# Patient Record
Sex: MALE | Race: WHITE | NOT HISPANIC OR LATINO | Employment: OTHER | URBAN - METROPOLITAN AREA
[De-identification: names, ages, dates, MRNs, and addresses within clinical notes are randomized per-mention and may not be internally consistent; named-entity substitution may affect disease eponyms.]

---

## 2019-02-02 ENCOUNTER — OFFICE VISIT (OUTPATIENT)
Dept: URGENT CARE | Age: 69
End: 2019-02-02
Payer: MEDICARE

## 2019-02-02 ENCOUNTER — APPOINTMENT (OUTPATIENT)
Dept: RADIOLOGY | Age: 69
End: 2019-02-02
Payer: MEDICARE

## 2019-02-02 VITALS
HEART RATE: 99 BPM | HEIGHT: 70 IN | BODY MASS INDEX: 22.05 KG/M2 | OXYGEN SATURATION: 97 % | RESPIRATION RATE: 18 BRPM | DIASTOLIC BLOOD PRESSURE: 69 MMHG | SYSTOLIC BLOOD PRESSURE: 133 MMHG | TEMPERATURE: 100.7 F | WEIGHT: 154 LBS

## 2019-02-02 DIAGNOSIS — R68.89 FLU-LIKE SYMPTOMS: Primary | ICD-10-CM

## 2019-02-02 DIAGNOSIS — R68.89 FLU-LIKE SYMPTOMS: ICD-10-CM

## 2019-02-02 PROCEDURE — 99203 OFFICE O/P NEW LOW 30 MIN: CPT | Performed by: PHYSICIAN ASSISTANT

## 2019-02-02 PROCEDURE — 71046 X-RAY EXAM CHEST 2 VIEWS: CPT

## 2019-02-02 PROCEDURE — G0463 HOSPITAL OUTPT CLINIC VISIT: HCPCS | Performed by: PHYSICIAN ASSISTANT

## 2019-02-02 RX ORDER — ATORVASTATIN CALCIUM 40 MG/1
40 TABLET, FILM COATED ORAL
COMMUNITY

## 2019-02-02 RX ORDER — ALBUTEROL SULFATE 1.25 MG/3ML
1 SOLUTION RESPIRATORY (INHALATION) EVERY 6 HOURS PRN
COMMUNITY
End: 2022-03-22

## 2019-02-02 RX ORDER — NITROGLYCERIN 0.4 MG/1
0.4 TABLET SUBLINGUAL
COMMUNITY
Start: 2012-03-26 | End: 2021-09-20

## 2019-02-02 RX ORDER — TAMSULOSIN HYDROCHLORIDE 0.4 MG/1
0.4 CAPSULE ORAL
COMMUNITY
End: 2021-09-20

## 2019-02-02 RX ORDER — GUAIFENESIN 600 MG
1200 TABLET, EXTENDED RELEASE 12 HR ORAL EVERY 12 HOURS SCHEDULED
Qty: 20 TABLET | Refills: 0 | Status: SHIPPED | OUTPATIENT
Start: 2019-02-02 | End: 2020-03-10 | Stop reason: ALTCHOICE

## 2019-02-02 RX ORDER — BENZONATATE 100 MG/1
100 CAPSULE ORAL 3 TIMES DAILY PRN
Qty: 20 CAPSULE | Refills: 0 | Status: SHIPPED | OUTPATIENT
Start: 2019-02-02 | End: 2020-03-10 | Stop reason: ALTCHOICE

## 2019-02-02 RX ORDER — SERTRALINE HYDROCHLORIDE 100 MG/1
100 TABLET, FILM COATED ORAL DAILY
COMMUNITY
Start: 2013-02-05

## 2019-02-02 RX ORDER — POLYVINYL ALCOHOL 14 MG/ML
1 SOLUTION/ DROPS OPHTHALMIC
COMMUNITY
Start: 2014-08-07

## 2019-02-02 NOTE — PROGRESS NOTES
[email or web address]        NAME: April Monroy is a 77 y o  male  : 1950    MRN: [id number]  DATE: 2019  TIME: 10:07 AM    Assessment and Plan   Flu-like symptoms [R68 89]  1  Flu-like symptoms  XR chest pa & lateral    benzonatate (TESSALON PERLES) 100 mg capsule    guaiFENesin (MUCINEX) 600 mg 12 hr tablet       CXR: no acute cardiopulmonary disease  In light of prior year's serious flu complications, emphasized symptoms in which to go to the ED  Patient verbalized understanding  Patient offered and declined Tamiflu, understanding that he is at the end of the effective range  Patient Instructions     Fluids and rest  Tylenol/Ibuprofen for pain/fever  Monitor for worsening symptoms   Follow up with PCP in 3-5 days  Proceed to  ER if symptoms worsen  Chief Complaint     Chief Complaint   Patient presents with    Headache     body hurts, neck pain, chest pressure bring up mucus green/yellow, fever yesterday          History of Present Illness       Patient had the influenza vaccination  States his son had the flu this past week  Admits to 7/10 sharp substernal chest pain that only occurs with coughing without radiation that feels similar to prior bronchitis episodes  URI    This is a new problem  Episode onset:  night  The problem has been gradually worsening  The maximum temperature recorded prior to his arrival was 100 4 - 100 9 F  Associated symptoms include chest pain, coughing (productive), headaches and neck pain  Pertinent negatives include no abdominal pain, congestion, diarrhea, ear pain, nausea, rash, rhinorrhea, sinus pain, sneezing, sore throat, vomiting or wheezing  Treatments tried: mucinex  The treatment provided mild relief  Review of Systems   Review of Systems   Constitutional: Positive for chills, fatigue and fever  Negative for activity change and appetite change     HENT: Negative for congestion, ear discharge, ear pain, postnasal drip, rhinorrhea, sinus pain, sinus pressure, sneezing and sore throat  Eyes: Negative for photophobia and visual disturbance  Respiratory: Positive for cough (productive) and shortness of breath  Negative for wheezing  Cardiovascular: Positive for chest pain  Negative for palpitations  Gastrointestinal: Negative for abdominal pain, diarrhea, nausea and vomiting  Musculoskeletal: Positive for myalgias and neck pain  Negative for neck stiffness  Skin: Negative for rash  Neurological: Positive for light-headedness and headaches  Negative for dizziness, tremors, seizures, syncope, facial asymmetry, speech difficulty and numbness  Psychiatric/Behavioral: Negative for confusion           Current Medications       Current Outpatient Prescriptions:     albuterol (ACCUNEB) 1 25 MG/3ML nebulizer solution, Take 1 ampule by nebulization every 6 (six) hours as needed for wheezing, Disp: , Rfl:     Cholecalciferol 2000 units CAPS, Take 2,000 Units by mouth, Disp: , Rfl:     nitroglycerin (NITROSTAT) 0 4 mg SL tablet, Place 0 4 mg under the tongue, Disp: , Rfl:     polyvinyl alcohol (LIQUIFILM TEARS) 1 4 % ophthalmic solution, Apply 1 drop to eye, Disp: , Rfl:     sertraline (ZOLOFT) 100 mg tablet, Take 100 mg by mouth, Disp: , Rfl:     atorvastatin (LIPITOR) 80 mg tablet, Take 40 mg by mouth, Disp: , Rfl:     benzonatate (TESSALON PERLES) 100 mg capsule, Take 1 capsule (100 mg total) by mouth 3 (three) times a day as needed for cough, Disp: 20 capsule, Rfl: 0    dextran 70-hypromellose (GENTEAL TEARS) 0 1-0 3 % ophthalmic solution, Apply 1 drop to eye, Disp: , Rfl:     guaiFENesin (MUCINEX) 600 mg 12 hr tablet, Take 2 tablets (1,200 mg total) by mouth every 12 (twelve) hours, Disp: 20 tablet, Rfl: 0    tamsulosin (FLOMAX) 0 4 mg, Take 0 4 mg by mouth, Disp: , Rfl:     Current Allergies     Allergies as of 02/02/2019 - Reviewed 02/02/2019   Allergen Reaction Noted    Cefadroxil Other (See Comments) 07/07/2004    Fentanyl Palpitations 02/02/2019            The following portions of the patient's history were reviewed and updated as appropriate: allergies, current medications, past family history, past medical history, past social history, past surgical history and problem list      Past Medical History:   Diagnosis Date    Hypertension     PTSD (post-traumatic stress disorder)        No past surgical history on file  Family History   Problem Relation Age of Onset    Coronary artery disease Brother     Hypertension Mother          Medications have been verified  Objective   /69   Pulse 99   Temp (!) 100 7 °F (38 2 °C)   Resp 18   Ht 5' 10" (1 778 m)   Wt 69 9 kg (154 lb)   SpO2 97%   BMI 22 10 kg/m²        Physical Exam     Physical Exam   Constitutional: He is oriented to person, place, and time  He appears well-developed and well-nourished  No distress  Patient appears well, is not in any distress and is speaking in full sentences  HENT:   Head: Normocephalic and atraumatic  Right Ear: External ear normal    Left Ear: External ear normal    Mouth/Throat: Oropharynx is clear and moist  No oropharyngeal exudate  Eyes: Pupils are equal, round, and reactive to light  Neck:   Negative Kernigs and Brudzinski  Cardiovascular: Normal rate, regular rhythm and normal heart sounds  Exam reveals no gallop and no friction rub  No murmur heard  Pulmonary/Chest: Effort normal and breath sounds normal  No respiratory distress  He has no wheezes  He has no rales  He exhibits no tenderness  Musculoskeletal: Normal range of motion  Lymphadenopathy:     He has no cervical adenopathy  Neurological: He is alert and oriented to person, place, and time  Skin: Skin is warm  No rash noted  Psychiatric: He has a normal mood and affect  His behavior is normal  Judgment and thought content normal    Vitals reviewed

## 2019-02-02 NOTE — PATIENT INSTRUCTIONS
Fluids and rest  Tylenol/Ibuprofen for pain/fever  Monitor for worsening symptoms   Follow up with PCP in 3-5 days  Proceed to  ER if symptoms worsen  Influenza   WHAT YOU NEED TO KNOW:   Influenza (the flu) is an infection caused by the influenza virus  The flu is easily spread when an infected person coughs, sneezes, or has close contact with others  You may be able to spread the flu to others for 1 week or longer after signs or symptoms appear  DISCHARGE INSTRUCTIONS:   Call 911 for any of the following:   · You have trouble breathing, and your lips look purple or blue  · You have a seizure  Return to the emergency department if:   · You are dizzy, or you are urinating less or not at all  · You have a headache with a stiff neck, and you feel tired or confused  · You have new pain or pressure in your chest     · Your symptoms, such as shortness of breath, vomiting, or diarrhea, get worse  · Your symptoms, such as fever and coughing, seem to get better, but then get worse  Contact your healthcare provider if:   · You have new muscle pain or weakness  · You have questions or concerns about your condition or care  Medicines: You may need any of the following:  · Acetaminophen  decreases pain and fever  It is available without a doctor's order  Ask how much to take and how often to take it  Follow directions  Acetaminophen can cause liver damage if not taken correctly  · NSAIDs , such as ibuprofen, help decrease swelling, pain, and fever  This medicine is available with or without a doctor's order  NSAIDs can cause stomach bleeding or kidney problems in certain people  If you take blood thinner medicine, always ask your healthcare provider if NSAIDs are safe for you  Always read the medicine label and follow directions  · Antivirals  help fight a viral infection  · Take your medicine as directed    Contact your healthcare provider if you think your medicine is not helping or if you have side effects  Tell him or her if you are allergic to any medicine  Keep a list of the medicines, vitamins, and herbs you take  Include the amounts, and when and why you take them  Bring the list or the pill bottles to follow-up visits  Carry your medicine list with you in case of an emergency  Rest  as much as you can to help you recover  Drink liquids as directed  to help prevent dehydration  Ask how much liquid to drink each day and which liquids are best for you  Prevent the spread of influenza:   · Wash your hands often  Use soap and water  Wash your hands after you use the bathroom, change a child's diapers, or sneeze  Wash your hands before you prepare or eat food  Use gel hand cleanser when soap and water are not available  Do not touch your eyes, nose, or mouth unless you have washed your hands first            · Cover your mouth when you sneeze or cough  Cough into a tissue or the bend of your arm  · Clean shared items with a germ-killing   Clean table surfaces, doorknobs, and light switches  Do not share towels, silverware, and dishes with people who are sick  Wash bed sheets, towels, silverware, and dishes with soap and water  · Wear a mask  over your mouth and nose if you are sick or are near anyone who is sick  · Stay away from others  if you are sick  · Influenza vaccine  helps prevent influenza (flu)  Everyone older than 6 months should get a yearly influenza vaccine  Get the vaccine as soon as it is available, usually in September or October each year  Follow up with your healthcare provider as directed:  Write down your questions so you remember to ask them during your visits  © 2017 [facility] Information is for End User's use only and may not be sold, redistributed or otherwise used for commercial purposes  All illustrations and images included in CareNotes® are the copyrighted property of [employer]  or [employer]ank[employer]    The above information is an  only  It is not intended as medical advice for individual conditions or treatments  Talk to your doctor, nurse or pharmacist before following any medical regimen to see if it is safe and effective for you

## 2020-01-13 ENCOUNTER — APPOINTMENT (OUTPATIENT)
Dept: RADIOLOGY | Age: 70
End: 2020-01-13
Payer: MEDICARE

## 2020-01-13 ENCOUNTER — OFFICE VISIT (OUTPATIENT)
Dept: URGENT CARE | Age: 70
End: 2020-01-13
Payer: MEDICARE

## 2020-01-13 VITALS
OXYGEN SATURATION: 99 % | SYSTOLIC BLOOD PRESSURE: 139 MMHG | TEMPERATURE: 99 F | HEART RATE: 80 BPM | RESPIRATION RATE: 20 BRPM | HEIGHT: 70 IN | WEIGHT: 150 LBS | DIASTOLIC BLOOD PRESSURE: 63 MMHG | BODY MASS INDEX: 21.47 KG/M2

## 2020-01-13 DIAGNOSIS — M79.646 PAIN OF FINGER, UNSPECIFIED LATERALITY: ICD-10-CM

## 2020-01-13 DIAGNOSIS — L03.90 CELLULITIS, UNSPECIFIED CELLULITIS SITE: ICD-10-CM

## 2020-01-13 DIAGNOSIS — M79.646 PAIN OF FINGER, UNSPECIFIED LATERALITY: Primary | ICD-10-CM

## 2020-01-13 PROCEDURE — 73130 X-RAY EXAM OF HAND: CPT

## 2020-01-13 PROCEDURE — G0463 HOSPITAL OUTPT CLINIC VISIT: HCPCS | Performed by: PHYSICIAN ASSISTANT

## 2020-01-13 PROCEDURE — 99213 OFFICE O/P EST LOW 20 MIN: CPT | Performed by: PHYSICIAN ASSISTANT

## 2020-01-13 RX ORDER — CLINDAMYCIN HYDROCHLORIDE 300 MG/1
300 CAPSULE ORAL 4 TIMES DAILY
Qty: 40 CAPSULE | Refills: 0 | Status: SHIPPED | OUTPATIENT
Start: 2020-01-13 | End: 2020-01-23

## 2020-01-13 NOTE — PATIENT INSTRUCTIONS
Finger Sprain   WHAT YOU NEED TO KNOW:   A finger sprain happens when ligaments in your finger or thumb are stretched or torn  Ligaments are the tough tissues that connect bones  Ligaments allow your hands to grasp and pinch  DISCHARGE INSTRUCTIONS:   Return to the emergency department if:   · The skin on your injured finger looks bluish or pale (less color than normal)  · You have new weakness or numbness in your finger or thumb  It may tingle or burn  · You have a splint that you cannot adjust and it feels too tight  Contact your healthcare provider if:   · You have new or increased swelling or pain in your finger  · You have new or increased stiffness when you move your injured finger  · You have questions or concerns about your injury or treatment  Medicines:   · Pain medicine  may be given  Do not wait until the pain is severe before taking your medicine  · Take your medicine as directed  Call your healthcare provider if you think your medicines are not helping or if you have side effects  Tell him if you take vitamins, herbs, or any other medicines  Keep a written list of your medicines  Include the amounts, and when and why you take them  Bring the list or the pill bottles to follow-up visits  Care for your finger:   · Rest  your finger for at least 48 hours  Do not do activities that cause pain  Return to normal activities as directed  · Apply ice  on your finger to help decrease pain and swelling  Put crushed ice in a plastic bag and cover it with a towel  Put the ice on your injured finger or thumb every hour for 15 to 20 minutes at a time  You may need to ice the area at least 4 to 8 times each day  Ice your finger for as many days as directed  · Elevate your finger  above the level of your heart as often as you can  This will help decrease swelling and pain  You can elevate your hand by resting it on a pillow  · Use a splint or compression as directed    Compression (tight hold) helps support your finger or thumb as it heals  Tape your injured finger to the finger beside it  Severe sprains may be treated with a splint  A splint prevents your finger from moving while it heals  Ask how long you must wear the splint or tape, and how to apply them  · Do exercises as directed  You may be given gentle exercises to begin in a few days  Exercises can help decrease stiffness in your finger or thumb  Exercises also help decrease pain and swelling and improve the movement of your finger or thumb  Check with your healthcare provider before you return to your normal activities or sports  Follow up with your healthcare provider as directed:  Write down any questions you may have to ask at your follow up visits  © 2017 [address] [employer]  Information is for End User's use only and may not be sold, redistributed or otherwise used for commercial purposes  All illustrations and images included in CareNotes® are the copyrighted property of A D A M [employer]  or Pankaj Avila  The above information is an  only  It is not intended as medical advice for individual conditions or treatments  Talk to your doctor, nurse or pharmacist before following any medical regimen to see if it is safe and effective for you  Cellulitis   WHAT YOU NEED TO KNOW:   Cellulitis is a skin infection caused by bacteria  Cellulitis may go away on its own or you may need treatment  Your healthcare provider may draw a [place] around the outside edges of your cellulitis  If your cellulitis spreads, your healthcare provider will see it outside of the [place]  DISCHARGE INSTRUCTIONS:   Call 911 if:   · You have sudden trouble breathing or chest pain  Return to the emergency department if:   · Your wound gets larger and more painful  · You feel a crackling under your skin when you touch it  · You have purple dots or bumps on your skin, or you see bleeding under your skin      · You have new swelling and pain in your legs  · The red, warm, swollen area gets larger  · You see red streaks coming from the infected area  Contact your healthcare provider if:   · You have a fever  · Your fever or pain does not go away or gets worse  · The area does not get smaller after 2 days of antibiotics  · Your skin is flaking or peeling off  · You have questions or concerns about your condition or care  Medicines:   · Antibiotics  help treat the bacterial infection  · NSAIDs , such as ibuprofen, help decrease swelling, pain, and fever  NSAIDs can cause stomach bleeding or kidney problems in certain people  If you take blood thinner medicine, always ask if NSAIDs are safe for you  Always read the medicine label and follow directions  Do not give these medicines to children under 10months of age without direction from your child's healthcare provider  · Acetaminophen  decreases pain and fever  It is available without a doctor's order  Ask how much to take and how often to take it  Follow directions  Read the labels of all other medicines you are using to see if they also contain acetaminophen, or ask your doctor or pharmacist  Acetaminophen can cause liver damage if not taken correctly  Do not use more than 4 grams (4,000 milligrams) total of acetaminophen in one day  · Take your medicine as directed  Contact your healthcare provider if you think your medicine is not helping or if you have side effects  Tell him or her if you are allergic to any medicine  Keep a list of the medicines, vitamins, and herbs you take  Include the amounts, and when and why you take them  Bring the list or the pill bottles to follow-up visits  Carry your medicine list with you in case of an emergency  Self-care:   · Elevate the area above the level of your heart  as often as you can  This will help decrease swelling and pain  Prop the area on pillows or blankets to keep it elevated comfortably  · Clean the area daily until the wound scabs over  Gently wash the area with soap and water  Pat dry  Use dressings as directed  · Place cool or warm, wet cloths on the area as directed  Use clean cloths and clean water  Leave it on the area until the cloth is room temperature  Pat the area dry with a clean, dry cloth  The cloths may help decrease pain  Prevent cellulitis:   · Do not scratch bug bites or areas of injury  You increase your risk for cellulitis by scratching these areas  · Do not share personal items, such as towels, clothing, and razors  · Clean exercise equipment  with germ-killing  before and after you use it  · Wash your hands often  Use soap and water  Wash your hands after you use the bathroom, change a child's diapers, or sneeze  Wash your hands before you prepare or eat food  Use lotion to prevent dry, cracked skin  · Wear pressure stockings as directed  You may be told to wear the stockings if you have peripheral edema  The stockings improve blood flow and decrease swelling  · Treat athlete's foot  This can help prevent the spread of a bacterial skin infection  Follow up with your healthcare provider within 3 days, or as directed: Your healthcare provider will check if your cellulitis is getting better  You may need different medicine  Write down your questions so you remember to ask them during your visits  © 2017 [address] [employer] Information is for End User's use only and may not be sold, redistributed or otherwise used for commercial purposes  All illustrations and images included in CareNotes® are the copyrighted property of A D A M [employer] Inc  or Pankaj Avila  The above information is an  only  It is not intended as medical advice for individual conditions or treatments  Talk to your doctor, nurse or pharmacist before following any medical regimen to see if it is safe and effective for you

## 2020-01-13 NOTE — PROGRESS NOTES
[facility]        NAME: Roosevelt Zuniga is a 68 y o  male  : 1950    MRN: [id number]  DATE: 2020  TIME: 9:35 AM    /63   Pulse 80   Temp 99 °F (37 2 °C)   Resp 20   Ht 5' 10" (1 778 m)   Wt 68 kg (150 lb)   SpO2 99%   BMI 21 52 kg/m²     Assessment and Plan   Pain of finger, unspecified laterality [M79 646]  1  Pain of finger, unspecified laterality  XR hand 3+ vw right   2  Cellulitis, unspecified cellulitis site  clindamycin (CLEOCIN) 300 MG capsule         Patient Instructions       Follow up with PCP in 3-5 days  Proceed to  ER if symptoms worsen  Chief Complaint     Chief Complaint   Patient presents with    Hand Pain     Right 2nd finger pain and edema         History of Present Illness       Right 2nd finger pain and swelling x 2 days  No known injury     Hand Pain    The incident occurred 2 days ago  The incident occurred at home  There was no injury mechanism  The pain is present in the right fingers  The quality of the pain is described as aching  The pain does not radiate  The pain is at a severity of 3/10  The pain is mild  The pain has been constant since the incident  Pertinent negatives include no chest pain, muscle weakness, numbness or tingling  Nothing aggravates the symptoms  He has tried nothing for the symptoms  The treatment provided no relief  Review of Systems   Review of Systems   Constitutional: Negative  HENT: Negative  Eyes: Negative  Respiratory: Negative  Cardiovascular: Negative  Negative for chest pain  Gastrointestinal: Negative  Endocrine: Negative  Genitourinary: Negative  Musculoskeletal: Negative  Skin: Negative  Allergic/Immunologic: Negative  Neurological: Negative  Negative for tingling and numbness  Hematological: Negative  Psychiatric/Behavioral: Negative  All other systems reviewed and are negative          Current Medications       Current Outpatient Medications:     albuterol (ACCUNEB) 1 25 MG/3ML nebulizer solution, Take 1 ampule by nebulization every 6 (six) hours as needed for wheezing, Disp: , Rfl:     atorvastatin (LIPITOR) 80 mg tablet, Take 40 mg by mouth, Disp: , Rfl:     dextran 70-hypromellose (GENTEAL TEARS) 0 1-0 3 % ophthalmic solution, Apply 1 drop to eye, Disp: , Rfl:     nitroglycerin (NITROSTAT) 0 4 mg SL tablet, Place 0 4 mg under the tongue, Disp: , Rfl:     polyvinyl alcohol (LIQUIFILM TEARS) 1 4 % ophthalmic solution, Apply 1 drop to eye, Disp: , Rfl:     sertraline (ZOLOFT) 100 mg tablet, Take 100 mg by mouth, Disp: , Rfl:     tamsulosin (FLOMAX) 0 4 mg, Take 0 4 mg by mouth, Disp: , Rfl:     benzonatate (TESSALON PERLES) 100 mg capsule, Take 1 capsule (100 mg total) by mouth 3 (three) times a day as needed for cough (Patient not taking: Reported on 1/13/2020), Disp: 20 capsule, Rfl: 0    Cholecalciferol 2000 units CAPS, Take 2,000 Units by mouth, Disp: , Rfl:     clindamycin (CLEOCIN) 300 MG capsule, Take 1 capsule (300 mg total) by mouth 4 (four) times a day for 10 days, Disp: 40 capsule, Rfl: 0    guaiFENesin (MUCINEX) 600 mg 12 hr tablet, Take 2 tablets (1,200 mg total) by mouth every 12 (twelve) hours (Patient not taking: Reported on 1/13/2020), Disp: 20 tablet, Rfl: 0    Current Allergies     Allergies as of 01/13/2020 - Reviewed 01/13/2020   Allergen Reaction Noted    Cefadroxil Other (See Comments) 07/07/2004    Fentanyl Palpitations 02/02/2019            The following portions of the patient's history were reviewed and updated as appropriate: allergies, current medications, past family history, past medical history, past social history, past surgical history and problem list      Past Medical History:   Diagnosis Date    Hypertension     PTSD (post-traumatic stress disorder)        History reviewed  No pertinent surgical history      Family History   Problem Relation Age of Onset    Coronary artery disease Brother     Hypertension Mother Medications have been verified  Objective   /63   Pulse 80   Temp 99 °F (37 2 °C)   Resp 20   Ht 5' 10" (1 778 m)   Wt 68 kg (150 lb)   SpO2 99%   BMI 21 52 kg/m²        Physical Exam     Physical Exam   Constitutional: He is oriented to person, place, and time  He appears well-developed and well-nourished  HENT:   Head: Normocephalic and atraumatic  Right Ear: External ear normal    Left Ear: External ear normal    Nose: Nose normal    Mouth/Throat: Oropharynx is clear and moist    Eyes: Pupils are equal, round, and reactive to light  Conjunctivae and EOM are normal    Neck: Normal range of motion  Neck supple  Cardiovascular: Normal rate, regular rhythm, normal heart sounds and intact distal pulses  Pulmonary/Chest: Effort normal and breath sounds normal    Abdominal: Soft  Bowel sounds are normal    Musculoskeletal: Normal range of motion  Right index finger swelling pip joint tender with slight erythema and swelling  From all joints    Neurological: He is alert and oriented to person, place, and time  Skin: Skin is warm  Capillary refill takes less than 2 seconds  Psychiatric: He has a normal mood and affect  His behavior is normal  Thought content normal    Nursing note and vitals reviewed

## 2020-01-16 ENCOUNTER — APPOINTMENT (OUTPATIENT)
Dept: LAB | Facility: MEDICAL CENTER | Age: 70
End: 2020-01-16
Payer: MEDICARE

## 2020-01-16 VITALS
DIASTOLIC BLOOD PRESSURE: 52 MMHG | BODY MASS INDEX: 21.47 KG/M2 | SYSTOLIC BLOOD PRESSURE: 138 MMHG | WEIGHT: 150 LBS | HEART RATE: 108 BPM | HEIGHT: 70 IN

## 2020-01-16 DIAGNOSIS — M10.041 ACUTE IDIOPATHIC GOUT OF RIGHT HAND: ICD-10-CM

## 2020-01-16 DIAGNOSIS — M10.041 ACUTE IDIOPATHIC GOUT OF RIGHT HAND: Primary | ICD-10-CM

## 2020-01-16 LAB — URATE SERPL-MCNC: 5.3 MG/DL (ref 4.2–8)

## 2020-01-16 PROCEDURE — 84550 ASSAY OF BLOOD/URIC ACID: CPT

## 2020-01-16 PROCEDURE — 99204 OFFICE O/P NEW MOD 45 MIN: CPT | Performed by: ORTHOPAEDIC SURGERY

## 2020-01-16 PROCEDURE — 36415 COLL VENOUS BLD VENIPUNCTURE: CPT

## 2020-01-16 RX ORDER — INDOMETHACIN 75 MG/1
75 CAPSULE, EXTENDED RELEASE ORAL 2 TIMES DAILY WITH MEALS
Qty: 60 CAPSULE | Refills: 0 | Status: SHIPPED | OUTPATIENT
Start: 2020-01-16 | End: 2020-02-06

## 2020-01-16 NOTE — PROGRESS NOTES
Chief Complaint     Right index finger pain and swelling      History of Present Illness     Catrina Blakely is a 68 y o  male who presents with insidious onset of swelling to the right index finger  He is right-hand dominant  Notes that about 1 week ago he noticed some pain and swelling in the index finger specifically around the PIP joint  It became worse and he went to urgent care  Says they gave him antibiotics because there concern for infection, but there were no blood work test done  Also notes that he had a infection of a different finger long time going to did not feel the same way as this feels  Notes no recent injury  Had an episode of pain in that joint over the summer but no swelling associated with it  Denies any numbness or tingling  Has no history of gout  Has not taken any anti-inflammatory medicine  Past Medical History:   Diagnosis Date    Hypertension     PTSD (post-traumatic stress disorder)        History reviewed  No pertinent surgical history      Allergies   Allergen Reactions    Cefadroxil Other (See Comments)     bleeding internally    Fentanyl Palpitations       Current Outpatient Medications on File Prior to Visit   Medication Sig Dispense Refill    albuterol (ACCUNEB) 1 25 MG/3ML nebulizer solution Take 1 ampule by nebulization every 6 (six) hours as needed for wheezing      atorvastatin (LIPITOR) 80 mg tablet Take 40 mg by mouth      Cholecalciferol 2000 units CAPS Take 2,000 Units by mouth      clindamycin (CLEOCIN) 300 MG capsule Take 1 capsule (300 mg total) by mouth 4 (four) times a day for 10 days 40 capsule 0    dextran 70-hypromellose (GENTEAL TEARS) 0 1-0 3 % ophthalmic solution Apply 1 drop to eye      nitroglycerin (NITROSTAT) 0 4 mg SL tablet Place 0 4 mg under the tongue      polyvinyl alcohol (LIQUIFILM TEARS) 1 4 % ophthalmic solution Apply 1 drop to eye      sertraline (ZOLOFT) 100 mg tablet Take 100 mg by mouth      tamsulosin (FLOMAX) 0 4 mg Take 0 4 mg by mouth      benzonatate (TESSALON PERLES) 100 mg capsule Take 1 capsule (100 mg total) by mouth 3 (three) times a day as needed for cough (Patient not taking: Reported on 1/13/2020) 20 capsule 0    guaiFENesin (MUCINEX) 600 mg 12 hr tablet Take 2 tablets (1,200 mg total) by mouth every 12 (twelve) hours (Patient not taking: Reported on 1/13/2020) 20 tablet 0     No current facility-administered medications on file prior to visit  Social History     Tobacco Use    Smoking status: Never Smoker    Smokeless tobacco: Never Used   Substance Use Topics    Alcohol use: No    Drug use: No       Family History   Problem Relation Age of Onset    Coronary artery disease Brother     Hypertension Mother        Review of Systems     As stated in the HPI  All other systems were reviewed and are negative  Physical Exam     /52   Pulse (!) 108   Ht 5' 10" (1 778 m)   Wt 68 kg (150 lb)   BMI 21 52 kg/m²     GENERAL: This is a well-developed, well-nourished, age-appropriate patient in no acute distress  The patient is alert and oriented x3  Pleasant and cooperative  Eyes: Anicteric sclerae  Extraocular movements appear intact  HENT: Nares are patent with no drainage  Lungs: There is equal chest rise on inspection  Breathing is non-labored with no audible wheezing  Cardiovascular: No cyanosis  No upper extremity lymphadema  Skin: Skin is warm to touch  No obvious skin lesions or rashes other than described below  Neurologic: No ataxia  Psychiatric: Mood and affect are appropriate  Examination right upper extremity reveals erythema and swelling about the PIP joint of the right index finger  He has a short 5th middle phalanx that is his baseline  Is able to range his PIP joint without significant pain from 0-90 degrees  Has limited motion about the finger at baseline secondary to arthritis that is known  No cut scrapes or lacerations  No fluctuance    There is a ready inflammation around the PIP joint  Mildly tender to palpation  No tenderness along the flexor tendon sheath  Finger is not held in a flexed position  No pain on passive extension  No fusiform swelling  Data Review     Results Reviewed     None             Imaging: Three-view imaging of the right hand taken in outside facility and personally reviewed by me today shows evidence of scattered arthrosis over the PIP and DIP joints with no significant erosive changes at the PIP of the index finger though there are risks of changes at other fingers  Assessment and Plan      Diagnoses and all orders for this visit:    Acute idiopathic gout of right hand  -     indomethacin (INDOCIN SR) 75 mg CR capsule; Take 1 capsule (75 mg total) by mouth 2 (two) times a day with meals  -     Uric acid; Future             26-year-old male with what appears to be a gout attack  Patient has radiographic evidence of erosive changes in other joints, and this is a swollen joint that does not appear to have an infection  I have discussed with him that gout is a possibility and so we will treat this with anti-inflammatories as well as testing his uric acid level  I strongly encouraged him to follow-up with his primary care doctor any discussed that he has to 1 at the [facility] 1 at [facility]  I said that I would call him with the results of his lab test if there is any significant concern from my point of view  I also discussed that he can resume all activity and continue moving  He will follow up in 3 weeks for a repeat evaluation        Follow Up:  3 weeks    To Do Next Visit:     PROCEDURES PERFORMED:  Procedures  No Procedures performed today

## 2020-01-22 ENCOUNTER — TELEPHONE (OUTPATIENT)
Dept: OBGYN CLINIC | Facility: CLINIC | Age: 70
End: 2020-01-22

## 2020-01-22 NOTE — TELEPHONE ENCOUNTER
Dr Sharon Andujar would like to know if you received the results of his blood work uric acid    Please call him [phone]  Thank you

## 2020-02-06 DIAGNOSIS — M10.041 ACUTE IDIOPATHIC GOUT OF RIGHT HAND: ICD-10-CM

## 2020-02-06 RX ORDER — INDOMETHACIN 75 MG/1
CAPSULE, EXTENDED RELEASE ORAL
Qty: 60 CAPSULE | Refills: 0 | Status: SHIPPED | OUTPATIENT
Start: 2020-02-06 | End: 2020-04-02 | Stop reason: ALTCHOICE

## 2020-03-10 ENCOUNTER — OFFICE VISIT (OUTPATIENT)
Dept: FAMILY MEDICINE CLINIC | Facility: CLINIC | Age: 70
End: 2020-03-10
Payer: MEDICARE

## 2020-03-10 VITALS
OXYGEN SATURATION: 98 % | BODY MASS INDEX: 21.56 KG/M2 | SYSTOLIC BLOOD PRESSURE: 130 MMHG | RESPIRATION RATE: 16 BRPM | TEMPERATURE: 97.9 F | HEIGHT: 70 IN | HEART RATE: 81 BPM | DIASTOLIC BLOOD PRESSURE: 70 MMHG | WEIGHT: 150.6 LBS

## 2020-03-10 DIAGNOSIS — E78.9 LIPID DISORDER: ICD-10-CM

## 2020-03-10 DIAGNOSIS — M19.049 HAND ARTHRITIS: Primary | ICD-10-CM

## 2020-03-10 DIAGNOSIS — M85.89 OSTEOPENIA OF MULTIPLE SITES: ICD-10-CM

## 2020-03-10 DIAGNOSIS — E21.3 HYPERPARATHYROIDISM (HCC): ICD-10-CM

## 2020-03-10 DIAGNOSIS — I47.1 PSVT (PAROXYSMAL SUPRAVENTRICULAR TACHYCARDIA) (HCC): ICD-10-CM

## 2020-03-10 PROBLEM — I47.10 PSVT (PAROXYSMAL SUPRAVENTRICULAR TACHYCARDIA): Status: ACTIVE | Noted: 2020-03-10

## 2020-03-10 PROBLEM — N20.0 NEPHROLITHIASIS: Status: ACTIVE | Noted: 2020-01-29

## 2020-03-10 PROCEDURE — 4040F PNEUMOC VAC/ADMIN/RCVD: CPT | Performed by: FAMILY MEDICINE

## 2020-03-10 PROCEDURE — 99214 OFFICE O/P EST MOD 30 MIN: CPT | Performed by: FAMILY MEDICINE

## 2020-03-10 PROCEDURE — 1160F RVW MEDS BY RX/DR IN RCRD: CPT | Performed by: FAMILY MEDICINE

## 2020-03-10 PROCEDURE — 1036F TOBACCO NON-USER: CPT | Performed by: FAMILY MEDICINE

## 2020-03-10 PROCEDURE — 3008F BODY MASS INDEX DOCD: CPT | Performed by: FAMILY MEDICINE

## 2020-03-10 RX ORDER — OMEPRAZOLE 20 MG/1
20 TABLET, DELAYED RELEASE ORAL
COMMUNITY
Start: 2014-08-07 | End: 2021-09-20

## 2020-04-02 ENCOUNTER — CONSULT (OUTPATIENT)
Dept: RHEUMATOLOGY | Facility: CLINIC | Age: 70
End: 2020-04-02
Payer: MEDICARE

## 2020-04-02 ENCOUNTER — APPOINTMENT (OUTPATIENT)
Dept: LAB | Facility: MEDICAL CENTER | Age: 70
End: 2020-04-02
Payer: MEDICARE

## 2020-04-02 ENCOUNTER — TELEPHONE (OUTPATIENT)
Dept: OBGYN CLINIC | Facility: HOSPITAL | Age: 70
End: 2020-04-02

## 2020-04-02 VITALS
TEMPERATURE: 98.4 F | WEIGHT: 149 LBS | HEART RATE: 92 BPM | DIASTOLIC BLOOD PRESSURE: 69 MMHG | SYSTOLIC BLOOD PRESSURE: 123 MMHG | BODY MASS INDEX: 21.33 KG/M2 | HEIGHT: 70 IN

## 2020-04-02 DIAGNOSIS — L40.9 PSORIASIS: ICD-10-CM

## 2020-04-02 DIAGNOSIS — M19.049 HAND ARTHRITIS: ICD-10-CM

## 2020-04-02 DIAGNOSIS — M19.90 INFLAMMATORY ARTHRITIS: Primary | ICD-10-CM

## 2020-04-02 LAB
CRP SERPL QL: <3 MG/L
ERYTHROCYTE [SEDIMENTATION RATE] IN BLOOD: 14 MM/HOUR (ref 0–10)

## 2020-04-02 PROCEDURE — 3075F SYST BP GE 130 - 139MM HG: CPT | Performed by: INTERNAL MEDICINE

## 2020-04-02 PROCEDURE — 3078F DIAST BP <80 MM HG: CPT | Performed by: INTERNAL MEDICINE

## 2020-04-02 PROCEDURE — 1160F RVW MEDS BY RX/DR IN RCRD: CPT | Performed by: INTERNAL MEDICINE

## 2020-04-02 PROCEDURE — 4040F PNEUMOC VAC/ADMIN/RCVD: CPT | Performed by: INTERNAL MEDICINE

## 2020-04-02 PROCEDURE — 36415 COLL VENOUS BLD VENIPUNCTURE: CPT | Performed by: INTERNAL MEDICINE

## 2020-04-02 PROCEDURE — 1036F TOBACCO NON-USER: CPT | Performed by: INTERNAL MEDICINE

## 2020-04-02 PROCEDURE — 86140 C-REACTIVE PROTEIN: CPT | Performed by: INTERNAL MEDICINE

## 2020-04-02 PROCEDURE — 99204 OFFICE O/P NEW MOD 45 MIN: CPT | Performed by: INTERNAL MEDICINE

## 2020-04-02 PROCEDURE — 85652 RBC SED RATE AUTOMATED: CPT | Performed by: INTERNAL MEDICINE

## 2020-04-02 NOTE — PROGRESS NOTES
Assessment and Plan: Riana Townsend is a 65 y o  male who presents as a Rheumatology consult referred by his PCP Richy Tee MD for evaluation of possible inflammatory arthritis involving the hands  Patient's clinical picture of history of asymmetric painful and swollen joints of hands, and psoriasis seems consistent with psoriatic arthritis  His DAS28 score today was 3 32, consistent with moderate inflammatory disease activity  However, since patient's arthritis symptoms are currently not bothering him, have decided to conservatively approach treatment with diclofenac gel to apply to painful joints as needed  For his psoriasis, which is mainly involving his head, he can continue his current steroid ointment as needed  Ordered inflammatory markers and HLA-B27 for psoriatic arthritis work-up; HLA-B27 returned negative but his ESR was slightly elevated  Will re-assess how patient is doing in three months  Plan:  Diagnoses and all orders for this visit:    Inflammatory arthritis  -     C-reactive protein  -     Sedimentation rate, automated  -     HLA-B27 antigen    Hand arthritis  -     C-reactive protein  -     Sedimentation rate, automated  -     HLA-B27 antigen  -     diclofenac sodium (VOLTAREN) 1 %; Apply 2 g topically 4 (four) times a day as needed (pain)    Psoriasis - continue steroid ointment as needed    Follow-up plan: Return to clinic in 3 months      Chief Complaint  Chief Complaint   Patient presents with    Joint Pain     R hand pain      HPI  Riana Townsend is a 65 y o   male who presents as a Rheumatology consult referred by his PCP Richy Tee MD for evaluation of possible inflammatory arthritis involving the hands  Patient states that in 12/2019, he started notice right second finger swelling that warm to the touch and very painful (7/10 in pain severity for three weeks in January 2020)  Then his right third finger started swelling and bothering him in 1/2020 as well   He was prescribed indomethacin 75mg po bid by hand surgeon Dr Debbie Rivero, which helped some; patient took for a few weeks  Lately, he hardly has any hand pain at all  Admits to his neck being sore lately  Admits to psoriasis on scalp, behind ears, and around his moustache, for which he uses a steroid ointment  Review of Systems  Review of Systems   Constitutional: Negative for chills, fatigue, fever and unexpected weight change  HENT: Negative for mouth sores and trouble swallowing  Eyes: Negative for pain and visual disturbance  Dry eyes   Respiratory: Negative for cough and shortness of breath  Cardiovascular: Negative for chest pain and leg swelling  Gastrointestinal: Negative for abdominal pain, blood in stool, constipation, diarrhea and nausea  Genitourinary: Positive for frequency  Musculoskeletal: Positive for arthralgias and joint swelling  Negative for back pain and myalgias  Skin: Positive for rash  Negative for color change  Allergic/Immunologic: Positive for environmental allergies  Neurological: Negative for weakness and numbness  Hematological: Negative for adenopathy  Psychiatric/Behavioral: Negative for sleep disturbance         Allergies  Allergies   Allergen Reactions    Cefadroxil Other (See Comments)     bleeding internally    Midazolam Other (See Comments)     ARRHYTHMIA    Prazosin     Fentanyl Palpitations       Home Medications    Current Outpatient Medications:     albuterol (ACCUNEB) 1 25 MG/3ML nebulizer solution, Take 1 ampule by nebulization every 6 (six) hours as needed for wheezing, Disp: , Rfl:     atorvastatin (LIPITOR) 80 mg tablet, Take 40 mg by mouth, Disp: , Rfl:     Cholecalciferol 2000 units CAPS, Take 2,000 Units by mouth, Disp: , Rfl:     dextran 70-hypromellose (GENTEAL TEARS) 0 1-0 3 % ophthalmic solution, Apply 1 drop to eye, Disp: , Rfl:     nitroglycerin (NITROSTAT) 0 4 mg SL tablet, Place 0 4 mg under the tongue, Disp: , Rfl:    omeprazole (PriLOSEC OTC) 20 MG tablet, Take 20 mg by mouth, Disp: , Rfl:     polyvinyl alcohol (LIQUIFILM TEARS) 1 4 % ophthalmic solution, Apply 1 drop to eye, Disp: , Rfl:     sertraline (ZOLOFT) 100 mg tablet, Take 100 mg by mouth, Disp: , Rfl:     tamsulosin (FLOMAX) 0 4 mg, Take 0 4 mg by mouth, Disp: , Rfl:     aspirin (Aspirin 81) 81 mg chewable tablet, CHEW ONE TABLET BY MOUTH EVERY DAY, Disp: , Rfl:     Carboxymethylcellulose Sod PF 0 25 % SOLN, INSTILL ONE DROP BOTH EYES FOUR TIMES A DAY FOR DRY EYES, Disp: , Rfl:     diclofenac sodium (VOLTAREN) 1 %, Apply 2 g topically 4 (four) times a day as needed (pain), Disp: 100 g, Rfl: 3    ketotifen (ZADITOR) 0 025 % ophthalmic solution, INSTILL ONE DROP BOTH EYES TWICE A DAY, Disp: , Rfl:     triamcinolone (KENALOG) 0 1 % ointment, Apply topically 2 (two) times a day, Disp: 80 g, Rfl: 3    Past Medical History  Past Medical History:   Diagnosis Date    Gout     Hypertension     Kidney stone     PTSD (post-traumatic stress disorder)     SVT (supraventricular tachycardia) (HCC)     Thyroid tumor, benign        Past Surgical History   Past Surgical History:   Procedure Laterality Date    CARDIAC ELECTROPHYSIOLOGY MAPPING AND ABLATION      SVT    PARATHYROIDECTOMY         Family History    Family History   Problem Relation Age of Onset    Coronary artery disease Brother     Hypertension Mother    mother - arthritis, unsure what kind  Bother - arthritis      Social History  Occupation: retired [employer]  Social History     Substance and Sexual Activity   Alcohol Use No     Social History     Substance and Sexual Activity   Drug Use No     Social History     Tobacco Use   Smoking Status Never Smoker   Smokeless Tobacco Never Used       Objective:  Vitals:    04/02/20 0951   BP: 123/69   Pulse: 92   Temp: 98 4 °F (36 9 °C)   Weight: 67 6 kg (149 lb)   Height: 5' 10" (1 778 m)       Physical Exam  Constitutional:       General: He is not in acute distress  Appearance: He is well-developed  HENT:      Head: Normocephalic and atraumatic  Eyes:      General: Lids are normal  No scleral icterus  Conjunctiva/sclera: Conjunctivae normal    Neck:      Musculoskeletal: Neck supple  No muscular tenderness  Thyroid: No thyromegaly  Cardiovascular:      Rate and Rhythm: Normal rate and regular rhythm  Heart sounds: S1 normal and S2 normal  No murmur  No friction rub  Pulmonary:      Effort: Pulmonary effort is normal  No tachypnea or respiratory distress  Breath sounds: Normal breath sounds  No wheezing, rhonchi or rales  Musculoskeletal:         General: Swelling, tenderness and deformity present  Comments: R 2nd and 3rd PIP swelling, tenderness, and prominence; R 2nd-4th DIP prominence, L 2nd and 3rd DIP prominence, L 2nd-4th PIP prominence   Lymphadenopathy:      Head:      Right side of head: No submental or submandibular adenopathy  Left side of head: No submental or submandibular adenopathy  Cervical: No cervical adenopathy  Skin:     General: Skin is warm and dry  Findings: Rash present  Nails: There is no clubbing  Comments: Psoriasis present on scalp, behind ears, and on face around moustache   Neurological:      Mental Status: He is alert  Sensory: No sensory deficit  Psychiatric:         Behavior: Behavior normal  Behavior is cooperative  Musculoskeletal--Peripheral Joint Exam:  Physical Exam     Tenderness:   Right hand: 2nd PIP and 3rd PIP    Swelling:   Right hand: 2nd PIP and 3rd PIP    BASS-28 tender joint count: 2  BASS-28 swollen joint count: 2  ESR (mm/hr): 14  Patient global assessment: 20  BASS-28 score: 3 32 (Moderate Disease Activity)      Reviewed labs and imaging  Imaging:   Right Hand x-rays 1/13/20  IMPRESSION:  Changes of arthritis most advanced at the 2nd DIP joint, and periarticular soft tissue swelling particularly the 2nd finger    Soft tissue swelling findings are suspicious for an inflammatory arthropathy  Labs:   Consult on 04/02/2020   Component Date Value Ref Range Status    CRP 04/02/2020 <3 0  <3 0 mg/L Final    Sed Rate 04/02/2020 14* 0 - 10 mm/hour Final    HLA B27 04/02/2020 see written report   Final   Appointment on 01/16/2020   Component Date Value Ref Range Status    Uric Acid 01/16/2020 5 3  4 2 - 8 0 mg/dL Final    Specimen collection should occur prior to Metamizole administration due to the potential for falsely depressed results

## 2020-04-02 NOTE — PATIENT INSTRUCTIONS
Do bloodwork today  Continue steroid ointment on rash as needed  Use diclofenac gel as needed for joint pain    Return to clinic in 3 months    Psoriatic Arthritis in Adults    What is psoriatic arthritis? -- Psoriatic arthritis is a condition that causes joint pain, swelling, and stiffness  It happens in people who have a long-term skin condition called psoriasis  People with psoriasis have patches of thick, red skin that are often covered by silver or white scales  Doctors don't know what causes psoriasis or psoriatic arthritis  What are the symptoms of psoriatic arthritis? -- Psoriatic arthritis causes pain, stiffness, and swelling in the affected joints  It can also affect the spine in some people  Because of the joint and spine problems, people can have trouble moving their body  Stiffness in the joints or low back is usually worse in the morning and lasts 30 minutes or longer  It usually gets better with exercise  Psoriatic arthritis can affect joints on one or both sides of the body  It usually affects more than one joint  In addition to joint symptoms (and the skin symptoms of psoriasis), people sometimes have other symptoms  These can include:  ? Swelling of a finger or toe, or the hands or feet  ? Swelling and pain in the back of the ankle or in the heel   ? Nail symptoms - The nails can look "pitted," as if they were pricked by a pin  The nail can also come up off the nail bed  ? Eye pain or redness  Is there a test for psoriatic arthritis? -- Yes  Your doctor or nurse will ask about your symptoms and do an exam  He or she will order X-rays of your painful joints  He or she might order an imaging test called an MRI  Imaging tests create pictures of the inside of the body  To check that another condition isn't causing your symptoms, your doctor or nurse might also order:  ?Blood tests  ? Lab tests on a sample of fluid from a swollen joint - To get a sample of fluid, the doctor will put a thin needle in your joint  How is psoriatic arthritis treated? -- There is no cure for psoriatic arthritis, but different treatments can help ease and control symptoms  Treatment for joint symptoms usually involves one or more of the following:  ?Medicines called nonsteroidal antiinflammatory drugs, or "NSAIDs" for short - Examples of NSAIDs are aspirin, ibuprofen (sample brand names: Advil, Motrin), and naproxen (sample brand name: Aleve)  ? Medicines that are usually used to treat other types of arthritis - Some of these include methotrexate and leflunomide  ? Medicines that block a substance called tumor necrosis factor, or "TNF" for short - TNF plays a role in psoriasis and psoriatic arthritis  Medicines that block TNF are called "anti-TNF" medicines  Examples include etanercept (brand name: Enbrel) and adalimumab (brand name: Humira)  ?Other medicines - If the options above don't help, your doctor might suggest trying a different medicine  Examples include ustekinumab (brand name: New Marie), secukinumab (brand name: Cosentyx), tofacitinib (brand name: Price Cannon), abatacept (brand name: Elly Grow), and apremilast (brand name: Collin Bales)  ? Shots of medicines called steroids that go into the painful joint - These are not the same as the steroids some athletes take illegally  These steroids help reduce swelling and pain  ? Heat - Heat, especially in the morning, can help reduce pain and stiffness  Do not use heat for longer than 20 minutes at a time  Also, do not use anything too hot that could burn your skin  ? Physical and occupational therapy - This involves learning exercises, movements, and ways of doing everyday tasks  ? Special shoe inserts (called "orthotics") - These can help keep your feet, ankles, and knees in the proper position  ?Treatment for psoriatic arthritis is usually long term  That's because even after symptoms get better, they sometimes return later on  Is there anything I can do on my own to feel better?  -- Yes  It is very important that you stay active  You might want to avoid being active because you are in pain  But this can make things worse  It can make your muscles weak and your joints stiffer than they already are  Your doctor, nurse, or physical therapist can help you figure out which activities and exercises are right for you

## 2020-04-08 LAB — HLA-B27 QL NAA+PROBE: NORMAL

## 2020-05-31 ENCOUNTER — APPOINTMENT (OUTPATIENT)
Dept: RADIOLOGY | Age: 70
End: 2020-05-31
Payer: MEDICARE

## 2020-05-31 ENCOUNTER — OFFICE VISIT (OUTPATIENT)
Dept: URGENT CARE | Age: 70
End: 2020-05-31
Payer: MEDICARE

## 2020-05-31 VITALS
BODY MASS INDEX: 21.33 KG/M2 | OXYGEN SATURATION: 100 % | RESPIRATION RATE: 16 BRPM | DIASTOLIC BLOOD PRESSURE: 62 MMHG | HEART RATE: 73 BPM | HEIGHT: 70 IN | SYSTOLIC BLOOD PRESSURE: 131 MMHG | TEMPERATURE: 97.2 F | WEIGHT: 149 LBS

## 2020-05-31 DIAGNOSIS — S62.665A CLOSED NONDISPLACED FRACTURE OF DISTAL PHALANX OF LEFT RING FINGER, INITIAL ENCOUNTER: Primary | ICD-10-CM
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
## 2020-05-31 DIAGNOSIS — S50.02XA CONTUSION OF LEFT ELBOW, INITIAL ENCOUNTER: ICD-10-CM

## 2020-05-31 DIAGNOSIS — S60.211A CONTUSION OF RIGHT WRIST, INITIAL ENCOUNTER: ICD-10-CM
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
## 2020-05-31 DIAGNOSIS — S01.21XA LACERATION OF NOSE, INITIAL ENCOUNTER: ICD-10-CM

## 2020-05-31 DIAGNOSIS — S60.011A CONTUSION OF RIGHT THUMB WITHOUT DAMAGE TO NAIL, INITIAL ENCOUNTER: ICD-10-CM
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
## 2020-05-31 PROCEDURE — 99213 OFFICE O/P EST LOW 20 MIN: CPT | Performed by: PHYSICIAN ASSISTANT

## 2020-05-31 PROCEDURE — G0463 HOSPITAL OUTPT CLINIC VISIT: HCPCS | Performed by: PHYSICIAN ASSISTANT

## 2020-05-31 PROCEDURE — 73110 X-RAY EXAM OF WRIST: CPT

## 2020-05-31 PROCEDURE — 73140 X-RAY EXAM OF FINGER(S): CPT

## 2020-05-31 PROCEDURE — 73130 X-RAY EXAM OF HAND: CPT

## 2020-05-31 PROCEDURE — 73080 X-RAY EXAM OF ELBOW: CPT

## 2020-05-31 RX ORDER — DOXYCYCLINE 100 MG/1
100 TABLET ORAL 2 TIMES DAILY
Qty: 14 TABLET | Refills: 0 | Status: SHIPPED | OUTPATIENT
Start: 2020-05-31 | End: 2020-06-07

## 2020-06-05 ENCOUNTER — OFFICE VISIT (OUTPATIENT)
Dept: OBGYN CLINIC | Facility: HOSPITAL | Age: 70
End: 2020-06-05
Payer: MEDICARE

## 2020-06-05 ENCOUNTER — OFFICE VISIT (OUTPATIENT)
Dept: OCCUPATIONAL THERAPY | Facility: HOSPITAL | Age: 70
End: 2020-06-05
Payer: MEDICARE

## 2020-06-05 VITALS
BODY MASS INDEX: 21.33 KG/M2 | HEIGHT: 70 IN | WEIGHT: 149 LBS | SYSTOLIC BLOOD PRESSURE: 124 MMHG | HEART RATE: 75 BPM | DIASTOLIC BLOOD PRESSURE: 66 MMHG

## 2020-06-05 DIAGNOSIS — M19.041 OSTEOARTHRITIS OF METACARPOPHALANGEAL (MCP) JOINT OF RIGHT THUMB: ICD-10-CM

## 2020-06-05 DIAGNOSIS — S62.665A CLOSED NONDISPLACED FRACTURE OF DISTAL PHALANX OF LEFT RING FINGER, INITIAL ENCOUNTER: Primary | ICD-10-CM

## 2020-06-05 DIAGNOSIS — S62.635D OPEN DISPLACED FRACTURE OF DISTAL PHALANX OF LEFT RING FINGER WITH ROUTINE HEALING, SUBSEQUENT ENCOUNTER: Primary | ICD-10-CM

## 2020-06-05 PROCEDURE — 99213 OFFICE O/P EST LOW 20 MIN: CPT | Performed by: SURGERY

## 2020-06-05 PROCEDURE — 20600 DRAIN/INJ JOINT/BURSA W/O US: CPT | Performed by: SURGERY

## 2020-06-05 PROCEDURE — L3933 FO W/O JOINTS CF: HCPCS | Performed by: OCCUPATIONAL THERAPIST

## 2020-06-05 RX ORDER — TRIAMCINOLONE ACETONIDE 40 MG/ML
20 INJECTION, SUSPENSION INTRA-ARTICULAR; INTRAMUSCULAR
Status: COMPLETED | OUTPATIENT
Start: 2020-06-05 | End: 2020-06-05

## 2020-06-05 RX ORDER — BUPIVACAINE HYDROCHLORIDE 2.5 MG/ML
0.5 INJECTION, SOLUTION INFILTRATION; PERINEURAL
Status: COMPLETED | OUTPATIENT
Start: 2020-06-05 | End: 2020-06-05

## 2020-06-05 RX ADMIN — TRIAMCINOLONE ACETONIDE 20 MG: 40 INJECTION, SUSPENSION INTRA-ARTICULAR; INTRAMUSCULAR at 16:16

## 2020-06-05 RX ADMIN — BUPIVACAINE HYDROCHLORIDE 0.5 ML: 2.5 INJECTION, SOLUTION INFILTRATION; PERINEURAL at 16:16

## 2020-06-18 ENCOUNTER — APPOINTMENT (OUTPATIENT)
Dept: RADIOLOGY | Facility: AMBULARY SURGERY CENTER | Age: 70
End: 2020-06-18
Attending: SURGERY
Payer: MEDICARE

## 2020-06-18 ENCOUNTER — OFFICE VISIT (OUTPATIENT)
Dept: OBGYN CLINIC | Facility: CLINIC | Age: 70
End: 2020-06-18
Payer: MEDICARE

## 2020-06-18 VITALS
HEIGHT: 70 IN | HEART RATE: 78 BPM | DIASTOLIC BLOOD PRESSURE: 64 MMHG | BODY MASS INDEX: 21.33 KG/M2 | WEIGHT: 149 LBS | SYSTOLIC BLOOD PRESSURE: 115 MMHG

## 2020-06-18 DIAGNOSIS — S62.665A CLOSED NONDISPLACED FRACTURE OF DISTAL PHALANX OF LEFT RING FINGER, INITIAL ENCOUNTER: Primary | ICD-10-CM

## 2020-06-18 DIAGNOSIS — S62.665A CLOSED NONDISPLACED FRACTURE OF DISTAL PHALANX OF LEFT RING FINGER, INITIAL ENCOUNTER: ICD-10-CM

## 2020-06-18 PROCEDURE — 73140 X-RAY EXAM OF FINGER(S): CPT

## 2020-06-18 PROCEDURE — 1160F RVW MEDS BY RX/DR IN RCRD: CPT | Performed by: SURGERY

## 2020-06-18 PROCEDURE — 4040F PNEUMOC VAC/ADMIN/RCVD: CPT | Performed by: SURGERY

## 2020-06-18 PROCEDURE — 99213 OFFICE O/P EST LOW 20 MIN: CPT | Performed by: SURGERY

## 2020-06-18 PROCEDURE — 3008F BODY MASS INDEX DOCD: CPT | Performed by: SURGERY

## 2020-06-18 PROCEDURE — 1036F TOBACCO NON-USER: CPT | Performed by: SURGERY

## 2020-06-22 PROBLEM — G47.00 INSOMNIA: Status: ACTIVE | Noted: 2020-06-22

## 2020-06-22 PROBLEM — H10.10 ALLERGIC CONJUNCTIVITIS: Status: ACTIVE | Noted: 2020-06-22

## 2020-06-22 PROBLEM — H35.60 RETINAL DOT HEMORRHAGE: Status: ACTIVE | Noted: 2020-06-22

## 2020-06-22 PROBLEM — F10.10 ALCOHOL ABUSE: Status: ACTIVE | Noted: 2020-06-22

## 2020-06-22 PROBLEM — H52.4 PRESBYOPIA: Status: ACTIVE | Noted: 2020-06-22

## 2020-06-22 PROBLEM — H35.3190 NONEXUDATIVE SENILE MACULAR DEGENERATION OF RETINA: Status: ACTIVE | Noted: 2020-06-22

## 2020-06-22 PROBLEM — I10 HYPERTENSION: Status: ACTIVE | Noted: 2020-06-22

## 2020-06-22 PROBLEM — H35.369 MACULAR DRUSEN: Status: ACTIVE | Noted: 2020-06-22

## 2020-06-22 PROBLEM — H25.819 COMBINED FORM OF SENILE CATARACT: Status: ACTIVE | Noted: 2020-06-22

## 2020-06-22 PROBLEM — J42 CHRONIC BRONCHITIS (HCC): Status: ACTIVE | Noted: 2020-06-22

## 2020-06-22 PROBLEM — E78.2 MIXED HYPERLIPIDEMIA: Status: ACTIVE | Noted: 2020-06-22

## 2020-06-22 PROBLEM — I65.23 BILATERAL CAROTID ARTERY STENOSIS: Status: ACTIVE | Noted: 2020-06-22

## 2020-06-22 PROBLEM — K63.5 POLYP OF COLON: Status: ACTIVE | Noted: 2020-06-22

## 2020-06-22 PROBLEM — E78.5 HYPERLIPIDEMIA: Status: ACTIVE | Noted: 2020-06-22

## 2020-06-22 PROBLEM — K21.9 GASTROESOPHAGEAL REFLUX DISEASE: Status: ACTIVE | Noted: 2020-06-22

## 2020-06-22 PROBLEM — J30.9 ALLERGIC RHINITIS: Status: ACTIVE | Noted: 2020-06-22

## 2020-06-22 PROBLEM — M67.40 GANGLION: Status: ACTIVE | Noted: 2020-06-22

## 2020-06-22 PROBLEM — F32.A DEPRESSIVE DISORDER: Status: ACTIVE | Noted: 2020-06-22

## 2020-06-22 PROBLEM — J45.909 ASTHMA: Status: ACTIVE | Noted: 2020-06-22

## 2020-06-22 PROBLEM — Z01.818 PREOP EXAMINATION: Status: ACTIVE | Noted: 2020-06-22

## 2020-06-22 PROBLEM — R45.851 SUICIDAL IDEATION: Status: ACTIVE | Noted: 2020-06-22

## 2020-06-22 PROBLEM — H53.029 REFRACTIVE AMBLYOPIA: Status: ACTIVE | Noted: 2020-06-22

## 2020-06-22 PROBLEM — L21.9 SEBORRHEIC DERMATITIS: Status: ACTIVE | Noted: 2020-06-22

## 2020-06-22 RX ORDER — ASPIRIN 81 MG/1
TABLET, CHEWABLE ORAL
COMMUNITY
End: 2021-09-20

## 2020-06-22 RX ORDER — KETOTIFEN FUMARATE 0.35 MG/ML
SOLUTION/ DROPS OPHTHALMIC
COMMUNITY

## 2020-06-23 ENCOUNTER — CONSULT (OUTPATIENT)
Dept: FAMILY MEDICINE CLINIC | Facility: CLINIC | Age: 70
End: 2020-06-23
Payer: MEDICARE

## 2020-06-23 VITALS
DIASTOLIC BLOOD PRESSURE: 70 MMHG | OXYGEN SATURATION: 95 % | SYSTOLIC BLOOD PRESSURE: 144 MMHG | BODY MASS INDEX: 21.62 KG/M2 | HEIGHT: 70 IN | HEART RATE: 75 BPM | WEIGHT: 151 LBS | RESPIRATION RATE: 16 BRPM | TEMPERATURE: 96.5 F

## 2020-06-23 DIAGNOSIS — K21.9 GASTROESOPHAGEAL REFLUX DISEASE, ESOPHAGITIS PRESENCE NOT SPECIFIED: ICD-10-CM

## 2020-06-23 DIAGNOSIS — I45.2 RIGHT BUNDLE BRANCH BLOCK (RBBB) PLUS LEFT ANTERIOR (LA) HEMIBLOCK: ICD-10-CM

## 2020-06-23 DIAGNOSIS — I47.1 PSVT (PAROXYSMAL SUPRAVENTRICULAR TACHYCARDIA) (HCC): ICD-10-CM

## 2020-06-23 DIAGNOSIS — E78.2 MIXED HYPERLIPIDEMIA: ICD-10-CM

## 2020-06-23 DIAGNOSIS — I10 ESSENTIAL HYPERTENSION: ICD-10-CM

## 2020-06-23 DIAGNOSIS — Z01.818 PREOP EXAMINATION: Primary | ICD-10-CM

## 2020-06-23 DIAGNOSIS — R39.12 BENIGN PROSTATIC HYPERPLASIA WITH WEAK URINARY STREAM: ICD-10-CM

## 2020-06-23 DIAGNOSIS — N40.1 BENIGN PROSTATIC HYPERPLASIA WITH WEAK URINARY STREAM: ICD-10-CM

## 2020-06-23 DIAGNOSIS — Z00.00 MEDICARE ANNUAL WELLNESS VISIT, SUBSEQUENT: ICD-10-CM

## 2020-06-23 DIAGNOSIS — F10.10 ALCOHOL ABUSE: ICD-10-CM

## 2020-06-23 DIAGNOSIS — M25.511 ACUTE PAIN OF RIGHT SHOULDER: ICD-10-CM

## 2020-06-23 DIAGNOSIS — Z86.39 HISTORY OF HYPERPARATHYROIDISM: ICD-10-CM

## 2020-06-23 PROCEDURE — 3078F DIAST BP <80 MM HG: CPT | Performed by: PHYSICIAN ASSISTANT

## 2020-06-23 PROCEDURE — 1170F FXNL STATUS ASSESSED: CPT | Performed by: PHYSICIAN ASSISTANT

## 2020-06-23 PROCEDURE — 1160F RVW MEDS BY RX/DR IN RCRD: CPT | Performed by: PHYSICIAN ASSISTANT

## 2020-06-23 PROCEDURE — 99214 OFFICE O/P EST MOD 30 MIN: CPT | Performed by: PHYSICIAN ASSISTANT

## 2020-06-23 PROCEDURE — 1036F TOBACCO NON-USER: CPT | Performed by: PHYSICIAN ASSISTANT

## 2020-06-23 PROCEDURE — 1125F AMNT PAIN NOTED PAIN PRSNT: CPT | Performed by: PHYSICIAN ASSISTANT

## 2020-06-23 PROCEDURE — 3008F BODY MASS INDEX DOCD: CPT | Performed by: PHYSICIAN ASSISTANT

## 2020-06-23 PROCEDURE — 4040F PNEUMOC VAC/ADMIN/RCVD: CPT | Performed by: PHYSICIAN ASSISTANT

## 2020-06-23 PROCEDURE — 3077F SYST BP >= 140 MM HG: CPT | Performed by: PHYSICIAN ASSISTANT

## 2020-06-23 PROCEDURE — G0438 PPPS, INITIAL VISIT: HCPCS | Performed by: PHYSICIAN ASSISTANT

## 2020-07-02 PROBLEM — Z00.00 MEDICARE ANNUAL WELLNESS VISIT, SUBSEQUENT: Status: RESOLVED | Noted: 2020-06-23 | Resolved: 2020-07-02

## 2020-07-09 ENCOUNTER — OFFICE VISIT (OUTPATIENT)
Dept: OBGYN CLINIC | Facility: CLINIC | Age: 70
End: 2020-07-09
Payer: MEDICARE

## 2020-07-09 ENCOUNTER — APPOINTMENT (OUTPATIENT)
Dept: RADIOLOGY | Facility: AMBULARY SURGERY CENTER | Age: 70
End: 2020-07-09
Attending: SURGERY
Payer: MEDICARE

## 2020-07-09 VITALS
WEIGHT: 151 LBS | DIASTOLIC BLOOD PRESSURE: 74 MMHG | SYSTOLIC BLOOD PRESSURE: 120 MMHG | HEART RATE: 76 BPM | BODY MASS INDEX: 21.62 KG/M2 | HEIGHT: 70 IN

## 2020-07-09 DIAGNOSIS — S62.665A CLOSED NONDISPLACED FRACTURE OF DISTAL PHALANX OF LEFT RING FINGER, INITIAL ENCOUNTER: ICD-10-CM

## 2020-07-09 DIAGNOSIS — S62.665A CLOSED NONDISPLACED FRACTURE OF DISTAL PHALANX OF LEFT RING FINGER, INITIAL ENCOUNTER: Primary | ICD-10-CM

## 2020-07-09 PROCEDURE — 4040F PNEUMOC VAC/ADMIN/RCVD: CPT | Performed by: SURGERY

## 2020-07-09 PROCEDURE — 3074F SYST BP LT 130 MM HG: CPT | Performed by: SURGERY

## 2020-07-09 PROCEDURE — 1170F FXNL STATUS ASSESSED: CPT | Performed by: SURGERY

## 2020-07-09 PROCEDURE — 99213 OFFICE O/P EST LOW 20 MIN: CPT | Performed by: SURGERY

## 2020-07-09 PROCEDURE — 1036F TOBACCO NON-USER: CPT | Performed by: SURGERY

## 2020-07-09 PROCEDURE — 3008F BODY MASS INDEX DOCD: CPT | Performed by: SURGERY

## 2020-07-09 PROCEDURE — 1160F RVW MEDS BY RX/DR IN RCRD: CPT | Performed by: SURGERY

## 2020-07-09 PROCEDURE — 73140 X-RAY EXAM OF FINGER(S): CPT

## 2020-07-09 PROCEDURE — 3078F DIAST BP <80 MM HG: CPT | Performed by: SURGERY

## 2020-07-09 NOTE — PROGRESS NOTES
ASSESSMENT/PLAN:      Nondisplaced fracture distal phalanx of left ring finger    The patient verbalized understanding of exam findings and treatment plan  We engaged in the shared decision-making process and treatment options were discussed at length with the patient  Surgical and conservative management discussed today along with risks and benefits  Diagnoses and all orders for this visit:    Closed nondisplaced fracture of distal phalanx of left ring finger, initial encounter  -     XR finger left fourth digit-ring; Future      * New xrays reviewed with the patient today  * On exam minimal tenderness at the fracture site  * Recommended to keep the hands moving and in colder weather wear gloves      Follow Up:  Return if symptoms worsen or fail to improve  To Do Next Visit:  Re-evaluation of current issue    ____________________________________________________________________________________________________________________________________________      CHIEF COMPLAINT:  Chief Complaint   Patient presents with    Right Hand - Follow-up    Left Hand - Follow-up       SUBJECTIVE:  Mabel Hogan is a 65y o  year old RHD male who presents today for a 3 week follow up for his left nondisplaced ring finger distal phalanx fracture and right MP joint arthritis flare up, DOI 05/30/2020  He has treated in a cap splint as well as a comfort cool brace  He has mild tenderness but its tolerable  I have personally reviewed all the relevant PMH, PSH, SH, FH, Medications and allergies       PAST MEDICAL HISTORY:  Past Medical History:   Diagnosis Date    Gout     Hypertension     Kidney stone     PTSD (post-traumatic stress disorder)     SVT (supraventricular tachycardia) (HCC)     Thyroid tumor, benign        PAST SURGICAL HISTORY:  Past Surgical History:   Procedure Laterality Date    CARDIAC ELECTROPHYSIOLOGY MAPPING AND ABLATION      SVT    PARATHYROIDECTOMY         FAMILY HISTORY:  Family History Problem Relation Age of Onset    Coronary artery disease Brother     Hypertension Mother        SOCIAL HISTORY:  Social History     Tobacco Use    Smoking status: Never Smoker    Smokeless tobacco: Never Used   Substance Use Topics    Alcohol use: No    Drug use: No       MEDICATIONS:    Current Outpatient Medications:     albuterol (ACCUNEB) 1 25 MG/3ML nebulizer solution, Take 1 ampule by nebulization every 6 (six) hours as needed for wheezing, Disp: , Rfl:     aspirin (Aspirin 81) 81 mg chewable tablet, CHEW ONE TABLET BY MOUTH EVERY DAY, Disp: , Rfl:     atorvastatin (LIPITOR) 80 mg tablet, Take 40 mg by mouth, Disp: , Rfl:     Carboxymethylcellulose Sod PF 0 25 % SOLN, INSTILL ONE DROP BOTH EYES FOUR TIMES A DAY FOR DRY EYES, Disp: , Rfl:     Cholecalciferol 2000 units CAPS, Take 2,000 Units by mouth, Disp: , Rfl:     dextran 70-hypromellose (GENTEAL TEARS) 0 1-0 3 % ophthalmic solution, Apply 1 drop to eye, Disp: , Rfl:     diclofenac sodium (VOLTAREN) 1 %, Apply 2 g topically 4 (four) times a day as needed (pain), Disp: 100 g, Rfl: 3    ketotifen (ZADITOR) 0 025 % ophthalmic solution, INSTILL ONE DROP BOTH EYES TWICE A DAY, Disp: , Rfl:     nitroglycerin (NITROSTAT) 0 4 mg SL tablet, Place 0 4 mg under the tongue, Disp: , Rfl:     omeprazole (PriLOSEC OTC) 20 MG tablet, Take 20 mg by mouth, Disp: , Rfl:     polyvinyl alcohol (LIQUIFILM TEARS) 1 4 % ophthalmic solution, Apply 1 drop to eye, Disp: , Rfl:     sertraline (ZOLOFT) 100 mg tablet, Take 100 mg by mouth, Disp: , Rfl:     tamsulosin (FLOMAX) 0 4 mg, Take 0 4 mg by mouth, Disp: , Rfl:     ALLERGIES:  Allergies   Allergen Reactions    Cefadroxil Other (See Comments)     bleeding internally    Midazolam Other (See Comments)     ARRHYTHMIA    Prazosin     Fentanyl Palpitations       REVIEW OF SYSTEMS:  Review of Systems   Constitutional: Negative for chills, fever and unexpected weight change     HENT: Negative for hearing loss, nosebleeds and sore throat  Eyes: Negative for pain, redness and visual disturbance  Respiratory: Negative for cough, shortness of breath and wheezing  Cardiovascular: Negative for chest pain, palpitations and leg swelling  Gastrointestinal: Negative for abdominal pain, nausea and vomiting  Endocrine: Negative for polydipsia and polyuria  Genitourinary: Negative for dysuria and hematuria  Skin: Negative for rash and wound  Neurological: Negative for dizziness, light-headedness and headaches  Psychiatric/Behavioral: Negative for decreased concentration, dysphoric mood and suicidal ideas  The patient is not nervous/anxious  VITALS:  Vitals:    07/09/20 1504   BP: 120/74   Pulse: 76       LABS:  HgA1c: No results found for: HGBA1C  BMP: No results found for: GLUCOSE, CALCIUM, NA, K, CO2, CL, BUN, CREATININE    _____________________________________________________  PHYSICAL EXAMINATION:  General: well developed and well nourished, alert, oriented times 3 and appears comfortable  Psychiatric: Normal  HEENT: Normocephalic, Atraumatic Trachea Midline, No torticollis  Pulmonary: No audible wheezing or respiratory distress   Cardiovascular: No pitting edema, 2+ radial pulse   Skin: No masses, erythema, lacerations, fluctation, ulcerations  Neurovascular: Sensation Intact to the Median, Ulnar, Radial Nerve, Motor Intact to the Median, Ulnar, Radial Nerve and Pulses Intact  Musculoskeletal: Normal, except as noted in detailed exam and in HPI  MUSCULOSKELETAL EXAMINATION:    Left ring finger:  Skin is intact, arthritic change seen within all DIP joints of the left hand     Mildly TTP over the DIP joint   Full composite  Full functional ROM of the finger  Sensation is intact to light touch  Brisk capillary refill  ___________________________________________________  STUDIES REVIEWED:  I have personally reviewed AP lateral and oblique radiographs of left hand   which demonstrate continues to be nondisplaced fracture of the distal phalanx of the left ring finger       PROCEDURES PERFORMED:  Procedures  No Procedures performed today    _____________________________________________________      Laura Age    I,:   Kimberly Noriega am acting as a scribe while in the presence of the attending physician :        I,:   Loreto Painting MD personally performed the services described in this documentation    as scribed in my presence :

## 2020-07-14 ENCOUNTER — OFFICE VISIT (OUTPATIENT)
Dept: RHEUMATOLOGY | Facility: CLINIC | Age: 70
End: 2020-07-14
Payer: MEDICARE

## 2020-07-14 VITALS
BODY MASS INDEX: 21.62 KG/M2 | SYSTOLIC BLOOD PRESSURE: 136 MMHG | HEIGHT: 70 IN | WEIGHT: 151 LBS | DIASTOLIC BLOOD PRESSURE: 78 MMHG | TEMPERATURE: 99.8 F

## 2020-07-14 DIAGNOSIS — L40.50 PSORIATIC ARTHRITIS (HCC): Primary | ICD-10-CM

## 2020-07-14 DIAGNOSIS — L40.9 PSORIASIS: ICD-10-CM

## 2020-07-14 DIAGNOSIS — M19.049 HAND ARTHRITIS: ICD-10-CM

## 2020-07-14 DIAGNOSIS — M06.041: ICD-10-CM

## 2020-07-14 PROCEDURE — 99214 OFFICE O/P EST MOD 30 MIN: CPT | Performed by: INTERNAL MEDICINE

## 2020-07-23 ENCOUNTER — HOSPITAL ENCOUNTER (OUTPATIENT)
Dept: RADIOLOGY | Facility: HOSPITAL | Age: 70
Discharge: HOME | End: 2020-07-23
Attending: INTERNAL MEDICINE
Payer: MEDICARE

## 2020-07-23 ENCOUNTER — TRANSCRIBE ORDERS (OUTPATIENT)
Dept: RADIOLOGY | Facility: HOSPITAL | Age: 70
End: 2020-07-23

## 2020-07-23 DIAGNOSIS — M19.049 HAND ARTHRITIS: ICD-10-CM

## 2020-07-23 DIAGNOSIS — M06.041: ICD-10-CM

## 2020-07-23 PROCEDURE — 76882 US LMTD JT/FCL EVL NVASC XTR: CPT

## 2020-07-30 LAB — HBA1C MFR BLD HPLC: 5.7 %

## 2020-08-09 ENCOUNTER — TELEPHONE (OUTPATIENT)
Dept: RHEUMATOLOGY | Facility: CLINIC | Age: 70
End: 2020-08-09

## 2020-08-09 PROBLEM — L40.9 PSORIASIS: Status: ACTIVE | Noted: 2020-08-09

## 2020-08-09 PROBLEM — M19.90 INFLAMMATORY ARTHRITIS: Status: ACTIVE | Noted: 2020-03-10

## 2020-08-09 NOTE — TELEPHONE ENCOUNTER
Please let patient know that his recent hand ultrasound results were consistent with psoriatic arthritis  How are his joint pain and rash symptoms doing lately? Does he feel like he needs a stronger medication to bring them both under better control? I can either start him on something now (such as methotrexate) or wait until his next clinic visit  Please let me know what he wants to do or if he has any questions      Thanks,  SAMMY

## 2020-08-10 NOTE — TELEPHONE ENCOUNTER
I attempted to contact the patient to discuss results  The phone line rang approximately 10 times with no answer or machine  Will try again tomorrow

## 2020-08-11 NOTE — TELEPHONE ENCOUNTER
I spoke with the patient  He stated his index and middle finger are swollen again and thumb hurts on right hand  He noted the cream doesn't seem to be helping, but wishes to wait for medication until his follow up appointment in November  I did tell him in the meantime if he changes his mind or his symptoms change/worsen to call us back

## 2020-08-13 DIAGNOSIS — L40.50 PSORIATIC ARTHRITIS (HCC): Primary | ICD-10-CM

## 2020-08-13 DIAGNOSIS — Z79.899 HIGH RISK MEDICATION USE: ICD-10-CM

## 2020-08-13 RX ORDER — FOLIC ACID 1 MG/1
1 TABLET ORAL DAILY
Qty: 90 TABLET | Refills: 3 | Status: SHIPPED | OUTPATIENT
Start: 2020-08-13 | End: 2020-12-15 | Stop reason: SDUPTHER

## 2020-08-13 RX ORDER — FOLIC ACID 1 MG/1
1 TABLET ORAL DAILY
Qty: 30 TABLET | Refills: 5 | Status: SHIPPED | OUTPATIENT
Start: 2020-08-13 | End: 2020-08-13

## 2020-08-13 NOTE — TELEPHONE ENCOUNTER
Just talked with patient, am starting him on methotrexate 4 tabs once a week and folic acid daily  I let him know that I want him to do labs a week before his next visit with me  Please mail him the lab orders I sent to the printer, since he tends to get them done at [facility]      Thanks,  HM

## 2020-08-13 NOTE — PROGRESS NOTES
Talked with patient and am starting him on methotrexate 4 tabs weekly and daily folic acid to start controlling his psoriatic arthritis  Labs ordered before next visit

## 2020-09-10 ENCOUNTER — OFFICE VISIT (OUTPATIENT)
Dept: FAMILY MEDICINE CLINIC | Facility: CLINIC | Age: 70
End: 2020-09-10
Payer: MEDICARE

## 2020-09-10 VITALS
TEMPERATURE: 97.8 F | HEART RATE: 70 BPM | DIASTOLIC BLOOD PRESSURE: 62 MMHG | OXYGEN SATURATION: 98 % | BODY MASS INDEX: 21.92 KG/M2 | SYSTOLIC BLOOD PRESSURE: 118 MMHG | RESPIRATION RATE: 16 BRPM | HEIGHT: 70 IN | WEIGHT: 153.13 LBS

## 2020-09-10 DIAGNOSIS — H61.23 BILATERAL IMPACTED CERUMEN: ICD-10-CM

## 2020-09-10 DIAGNOSIS — E78.2 MIXED HYPERLIPIDEMIA: ICD-10-CM

## 2020-09-10 DIAGNOSIS — I10 ESSENTIAL HYPERTENSION: ICD-10-CM

## 2020-09-10 DIAGNOSIS — M19.90 INFLAMMATORY ARTHRITIS: ICD-10-CM

## 2020-09-10 DIAGNOSIS — F32.A DEPRESSIVE DISORDER: ICD-10-CM

## 2020-09-10 DIAGNOSIS — J45.909 ASTHMA, UNSPECIFIED ASTHMA SEVERITY, UNSPECIFIED WHETHER COMPLICATED, UNSPECIFIED WHETHER PERSISTENT: Primary | ICD-10-CM

## 2020-09-10 DIAGNOSIS — K21.9 GASTROESOPHAGEAL REFLUX DISEASE, ESOPHAGITIS PRESENCE NOT SPECIFIED: ICD-10-CM

## 2020-09-10 PROCEDURE — 99214 OFFICE O/P EST MOD 30 MIN: CPT | Performed by: FAMILY MEDICINE

## 2020-09-10 NOTE — PROGRESS NOTES
Assessment/Plan:    Gastroesophageal reflux disease  Stable  Continue same  Will continue to monitor  Asthma  Stable  Continue same  Will continue to monitor  Essential hypertension  Well controlled  Continue same  Will continue to monitor  Inflammatory arthritis  Not well controlled  Continue follow-up with rheumatologist   Continue on methotrexate  Mixed hyperlipidemia  It was discussed about low-fat diet and regular exercise  Continue same  Will continue to monitor fasting lipid profile  Depressive disorder  Stable  Continue same  Continue to monitor  He is to continue follow-up with the [place]  Bilateral impacted cerumen  Removed using curette  Problem List Items Addressed This Visit        Digestive    Gastroesophageal reflux disease     Stable  Continue same  Will continue to monitor  Respiratory    Asthma - Primary     Stable  Continue same  Will continue to monitor  Cardiovascular and Mediastinum    Essential hypertension     Well controlled  Continue same  Will continue to monitor  Nervous and Auditory    Bilateral impacted cerumen     Removed using curette  Musculoskeletal and Integument    Inflammatory arthritis     Not well controlled  Continue follow-up with rheumatologist   Continue on methotrexate  Other    Mixed hyperlipidemia     It was discussed about low-fat diet and regular exercise  Continue same  Will continue to monitor fasting lipid profile  Depressive disorder     Stable  Continue same  Continue to monitor  He is to continue follow-up with the [place]  Subjective:      Patient ID: Tracey Silverman is a 65 y o  male  Routine f/u  C/o R hand thumb, index, and middle finger arthritic pain and swelling  Started on methotrexate, no improvement  S/P prostate Sx, nocturia gradually improving  Due for colonoscopy in 2022  Will get flu shot         The following portions of the patient's history were reviewed and updated as appropriate: allergies, current medications, past family history, past medical history, past social history, past surgical history and problem list     Review of Systems   Constitutional: Negative for chills and fever  HENT: Negative for trouble swallowing  Eyes: Negative for visual disturbance  Respiratory: Negative for cough and shortness of breath  Cardiovascular: Negative for chest pain and palpitations  Gastrointestinal: Negative for abdominal pain, blood in stool and vomiting  Endocrine: Negative for cold intolerance and heat intolerance  Genitourinary: Negative for difficulty urinating and dysuria  Musculoskeletal: Positive for arthralgias and joint swelling  Negative for gait problem  R thumb, index, middle fingers  Skin: Negative for rash  Neurological: Negative for dizziness, syncope and headaches  Hematological: Negative for adenopathy  Psychiatric/Behavioral: Negative for behavioral problems  Objective:      /62 (BP Location: Left arm, Patient Position: Sitting, Cuff Size: Adult)   Pulse 70   Temp 97 8 °F (36 6 °C) (Tympanic)   Resp 16   Ht 5' 10" (1 778 m)   Wt 69 5 kg (153 lb 2 oz)   SpO2 98%   BMI 21 97 kg/m²          Physical Exam  Vitals signs and nursing note reviewed  Constitutional:       Appearance: Normal appearance  He is well-developed  HENT:      Head: Normocephalic and atraumatic  Right Ear: Tympanic membrane normal  There is impacted cerumen  Left Ear: Tympanic membrane normal  There is impacted cerumen  Eyes:      Pupils: Pupils are equal, round, and reactive to light  Neck:      Musculoskeletal: Normal range of motion and neck supple  Cardiovascular:      Rate and Rhythm: Normal rate and regular rhythm  Heart sounds: Normal heart sounds  Pulmonary:      Effort: Pulmonary effort is normal       Breath sounds: Normal breath sounds     Abdominal:      General: Bowel sounds are normal       Palpations: Abdomen is soft  Musculoskeletal: Normal range of motion  General: Swelling and deformity present  Comments: Swelling of 1st MCPs and PIP on R hand index and middle finger  Lymphadenopathy:      Cervical: No cervical adenopathy  Skin:     General: Skin is warm  Findings: No rash  Neurological:      General: No focal deficit present  Mental Status: He is alert and oriented to person, place, and time  Cranial Nerves: No cranial nerve deficit  Ear cerumen removal    Date/Time: 9/10/2020 1:23 PM  Performed by: Patricia Romero MD  Authorized by: Patricia Romero MD     Patient location:  Clinic  Other Assisting Provider: No    Consent:     Consent obtained:  Verbal    Consent given by:  Patient    Risks discussed:  Bleeding and dizziness    Alternatives discussed:  No treatment  Universal protocol:     Procedure explained and questions answered to patient or proxy's satisfaction: yes      Patient identity confirmed:  Verbally with patient  Procedure details:     Local anesthetic:  None    Location:  L ear and R ear    Procedure type: curette      Approach:  External  Post-procedure details:     Complication:  None    Hearing quality:  Improved    Patient tolerance of procedure:   Tolerated well, no immediate complications

## 2020-10-28 ENCOUNTER — TELEPHONE (OUTPATIENT)
Dept: OBGYN CLINIC | Facility: HOSPITAL | Age: 70
End: 2020-10-28

## 2020-11-30 ENCOUNTER — TELEPHONE (OUTPATIENT)
Dept: OBGYN CLINIC | Facility: HOSPITAL | Age: 70
End: 2020-11-30

## 2020-12-15 ENCOUNTER — OFFICE VISIT (OUTPATIENT)
Dept: RHEUMATOLOGY | Facility: CLINIC | Age: 70
End: 2020-12-15
Payer: MEDICARE

## 2020-12-15 VITALS
SYSTOLIC BLOOD PRESSURE: 114 MMHG | DIASTOLIC BLOOD PRESSURE: 64 MMHG | BODY MASS INDEX: 21.94 KG/M2 | WEIGHT: 153.22 LBS | HEART RATE: 74 BPM | HEIGHT: 70 IN

## 2020-12-15 DIAGNOSIS — Z79.899 HIGH RISK MEDICATION USE: ICD-10-CM

## 2020-12-15 DIAGNOSIS — L40.50 PSORIATIC ARTHRITIS (HCC): ICD-10-CM

## 2020-12-15 PROCEDURE — 99213 OFFICE O/P EST LOW 20 MIN: CPT | Performed by: INTERNAL MEDICINE

## 2020-12-15 RX ORDER — FOLIC ACID 1 MG/1
1 TABLET ORAL DAILY
Qty: 90 TABLET | Refills: 3 | Status: SHIPPED | OUTPATIENT
Start: 2020-12-15 | End: 2021-04-22 | Stop reason: ALTCHOICE

## 2020-12-27 PROBLEM — L40.50 PSORIATIC ARTHRITIS (HCC): Status: ACTIVE | Noted: 2020-12-27

## 2021-03-15 ENCOUNTER — OFFICE VISIT (OUTPATIENT)
Dept: FAMILY MEDICINE CLINIC | Facility: CLINIC | Age: 71
End: 2021-03-15
Payer: MEDICARE

## 2021-03-15 VITALS
DIASTOLIC BLOOD PRESSURE: 70 MMHG | RESPIRATION RATE: 16 BRPM | TEMPERATURE: 97 F | HEART RATE: 70 BPM | BODY MASS INDEX: 22.53 KG/M2 | HEIGHT: 70 IN | WEIGHT: 157.38 LBS | SYSTOLIC BLOOD PRESSURE: 112 MMHG | OXYGEN SATURATION: 97 %

## 2021-03-15 DIAGNOSIS — I10 ESSENTIAL HYPERTENSION: ICD-10-CM

## 2021-03-15 DIAGNOSIS — E78.2 MIXED HYPERLIPIDEMIA: ICD-10-CM

## 2021-03-15 DIAGNOSIS — E21.3 HYPERPARATHYROIDISM (HCC): ICD-10-CM

## 2021-03-15 DIAGNOSIS — I47.1 PSVT (PAROXYSMAL SUPRAVENTRICULAR TACHYCARDIA) (HCC): ICD-10-CM

## 2021-03-15 DIAGNOSIS — R73.9 HYPERGLYCEMIA: ICD-10-CM

## 2021-03-15 DIAGNOSIS — L40.50 PSORIATIC ARTHRITIS (HCC): Primary | ICD-10-CM

## 2021-03-15 DIAGNOSIS — G89.29 CHRONIC PAIN OF RIGHT KNEE: ICD-10-CM

## 2021-03-15 DIAGNOSIS — M25.561 CHRONIC PAIN OF RIGHT KNEE: ICD-10-CM

## 2021-03-15 DIAGNOSIS — Z12.5 PROSTATE CANCER SCREENING: ICD-10-CM

## 2021-03-15 PROCEDURE — 99214 OFFICE O/P EST MOD 30 MIN: CPT | Performed by: FAMILY MEDICINE

## 2021-03-15 NOTE — PROGRESS NOTES
Assessment/Plan:  Chronic pain of right knee    Referral to see Ortho  Hyperglycemia    Discussed about low carb diet  I will check his A1c  Mixed hyperlipidemia    Discussed about low-fat diet  Continue same  Will continue to monitor fasting lipid profile  [facility] ([facility] )    Not well controlled  Continue same  Continue to follow up with rheumatologist     Essential hypertension    Well controlled  Continue same  Will continue to monitor  PSVT (paroxysmal supraventricular tachycardia) ([facility])    Stable  Asymptomatic  Continue same  Continue to follow up with cardiologist        Diagnoses and all orders for this visit:    Psoriatic arthritis ([facility] )    PSVT (paroxysmal supraventricular tachycardia) ([facility] )    Hyperparathyroidism ([facility] )    Essential hypertension  -     CBC and differential; Future  -     Comprehensive metabolic panel; Future  -     TSH, 3rd generation with Free T4 reflex; Future    Mixed hyperlipidemia  -     CBC and differential; Future  -     Lipid Panel with Direct LDL reflex; Future  -     TSH, 3rd generation with Free T4 reflex; Future    Hyperglycemia  -     Hemoglobin A1C; Future    Prostate cancer screening  -     PSA, Total Screen; Future    Chronic pain of right knee  -     Ambulatory referral to Orthopedic Surgery; Future          There are no Patient Instructions on file for this visit  Return in about 6 months (around 9/15/2021)  Subjective:      Patient ID: Tarah Diego is a 65 y o  male  Chief Complaint   Patient presents with    Hyperlipidemia    GERD         He is here today for follow-up multiple medical problems  He has been taking his medications  He has been following with his rheumatologist   He is in methotrexate for his  Psoriasis arthritis  He stated does not seem to help  He also complained of left knee pain has been chronic  Denies any recent history of injury or trauma        The following portions of the patient's history were reviewed and updated as appropriate: allergies, current medications, past family history, past medical history, past social history, past surgical history and problem list     Review of Systems   Constitutional: Negative for chills and fever  HENT: Negative for trouble swallowing  Eyes: Negative for visual disturbance  Respiratory: Negative for cough and shortness of breath  Cardiovascular: Negative for chest pain and palpitations  Gastrointestinal: Negative for abdominal pain, blood in stool and vomiting  Endocrine: Negative for cold intolerance and heat intolerance  Genitourinary: Negative for difficulty urinating and dysuria  Musculoskeletal: Positive for arthralgias  Left knee pain and arthralgia in multiple joints including hands bilateral    Skin: Negative for rash  Neurological: Negative for dizziness, syncope and headaches  Hematological: Negative for adenopathy  Psychiatric/Behavioral: Negative for behavioral problems           Current Outpatient Medications   Medication Sig Dispense Refill    albuterol (ACCUNEB) 1 25 MG/3ML nebulizer solution Take 1 ampule by nebulization every 6 (six) hours as needed for wheezing      aspirin (Aspirin 81) 81 mg chewable tablet CHEW ONE TABLET BY MOUTH EVERY DAY      atorvastatin (LIPITOR) 80 mg tablet Take 40 mg by mouth      Cholecalciferol 2000 units CAPS Take 2,000 Units by mouth      folic acid (FOLVITE) 1 mg tablet Take 1 tablet (1 mg total) by mouth daily 90 tablet 3    methotrexate 2 5 mg tablet 5 tablets po weekly (take all 5 tablets on the same day every week) 60 tablet 1    nitroglycerin (NITROSTAT) 0 4 mg SL tablet Place 0 4 mg under the tongue      sertraline (ZOLOFT) 100 mg tablet Take 100 mg by mouth      triamcinolone (KENALOG) 0 1 % ointment Apply topically 2 (two) times a day 80 g 3    Carboxymethylcellulose Sod PF 0 25 % SOLN INSTILL ONE DROP BOTH EYES FOUR TIMES A DAY FOR DRY EYES      dextran 70-hypromellose (GENTEAL TEARS) 0 1-0 3 % ophthalmic solution Apply 1 drop to eye      diclofenac sodium (VOLTAREN) 1 % Apply 2 g topically 4 (four) times a day as needed (pain) (Patient not taking: Reported on 3/15/2021) 100 g 3    ketotifen (ZADITOR) 0 025 % ophthalmic solution INSTILL ONE DROP BOTH EYES TWICE A DAY      omeprazole (PriLOSEC OTC) 20 MG tablet Take 20 mg by mouth      polyvinyl alcohol (LIQUIFILM TEARS) 1 4 % ophthalmic solution Apply 1 drop to eye      tamsulosin (FLOMAX) 0 4 mg Take 0 4 mg by mouth       No current facility-administered medications for this visit  Objective:    /70 (BP Location: Left arm, Patient Position: Sitting, Cuff Size: Adult)   Pulse 70   Temp (!) 97 °F (36 1 °C) (Tympanic)   Resp 16   Ht 5' 10" (1 778 m)   Wt 71 4 kg (157 lb 6 oz)   SpO2 97%   BMI 22 58 kg/m²        Physical Exam  Vitals signs and nursing note reviewed  Constitutional:       Appearance: He is well-developed  HENT:      Head: Normocephalic and atraumatic  Eyes:      Pupils: Pupils are equal, round, and reactive to light  Neck:      Musculoskeletal: Normal range of motion and neck supple  Cardiovascular:      Rate and Rhythm: Normal rate and regular rhythm  Heart sounds: Normal heart sounds  Pulmonary:      Effort: Pulmonary effort is normal       Breath sounds: Normal breath sounds  Abdominal:      General: Bowel sounds are normal       Palpations: Abdomen is soft  Musculoskeletal:         General: Swelling and tenderness present  Lymphadenopathy:      Cervical: No cervical adenopathy  Skin:     General: Skin is warm  Findings: No rash  Neurological:      Mental Status: He is alert and oriented to person, place, and time  Cranial Nerves: No cranial nerve deficit                  Oscar Phillip MD

## 2021-03-18 ENCOUNTER — OFFICE VISIT (OUTPATIENT)
Dept: URGENT CARE | Age: 71
End: 2021-03-18
Payer: MEDICARE

## 2021-03-18 VITALS
WEIGHT: 155 LBS | OXYGEN SATURATION: 98 % | DIASTOLIC BLOOD PRESSURE: 82 MMHG | TEMPERATURE: 96.4 F | SYSTOLIC BLOOD PRESSURE: 153 MMHG | HEIGHT: 70 IN | RESPIRATION RATE: 18 BRPM | BODY MASS INDEX: 22.19 KG/M2 | HEART RATE: 79 BPM

## 2021-03-18 DIAGNOSIS — S69.91XA INJURY OF RIGHT THUMB, INITIAL ENCOUNTER: Primary | ICD-10-CM

## 2021-03-18 DIAGNOSIS — S61.001A AVULSION OF SKIN OF RIGHT THUMB, INITIAL ENCOUNTER: ICD-10-CM

## 2021-03-18 PROCEDURE — 99213 OFFICE O/P EST LOW 20 MIN: CPT | Performed by: NURSE PRACTITIONER

## 2021-03-18 PROCEDURE — G0463 HOSPITAL OUTPT CLINIC VISIT: HCPCS | Performed by: NURSE PRACTITIONER

## 2021-03-18 NOTE — PATIENT INSTRUCTIONS
Change dressing daily   Keep clean and dry  surgi- foam will fall off on its own  Watch for signs or infection, redness drainage and swelling return or go to the ER or see pcp      Skin Avulsion   WHAT YOU NEED TO KNOW:   Skin avulsion is a wound that happens when skin is torn from your body during an accident or other injury  The torn skin may be lost or too damaged to be repaired, and it must be removed  A wound of this type cannot be stitched closed because there is tissue missing  Avulsion wounds are usually bigger and have more scars because of the missing tissue  DISCHARGE INSTRUCTIONS:   Medicines:   · Antibiotic ointment:  Your healthcare provider may tell you to gently rub a topical antibiotic ointment on your wound  This will help prevent an infection and help your wound heal faster  · Pain medicine: You may be given medicine to take away or decrease pain  Do not wait until the pain is severe before you take your medicine  · NSAIDs , such as ibuprofen, help decrease swelling, pain, and fever  This medicine is available with or without a doctor's order  NSAIDs can cause stomach bleeding or kidney problems in certain people  If you take blood thinner medicine, always ask if NSAIDs are safe for you  Always read the medicine label and follow directions  Do not give these medicines to children under 10months of age without direction from your child's healthcare provider  · Take your medicine as directed  Contact your healthcare provider if you think your medicine is not helping or if you have side effects  Tell him of her if you are allergic to any medicine  Keep a list of the medicines, vitamins, and herbs you take  Include the amounts, and when and why you take them  Bring the list or the pill bottles to follow-up visits  Carry your medicine list with you in case of an emergency  Care for your wound:  Avulsion wounds may take longer to heal because they cannot be closed with tape or stitches  Keep your wound clean and protected to prevent infection and speed healing  · Clean your wound:  Wash your hands with soap and water before and after you care for your wound  You may be able to use a soft cloth to gently clean the wound after the first 24 to 48 hours  After that, gently clean the wound once or twice a day with cool water  Do not soak your wound  Use soap to clean around the wound, but try not to get any on the wound itself  Do not use alcohol or hydrogen peroxide to clean your wound unless you are directed to  Gently pat the area dry and reapply the bandage as directed  · Elevate your wound:  Prop your injured area on pillows to raise it above the level of your heart  This will help reduce pain and swelling  Do this for 30 minutes at a time, as often as you can  · Bandage your wound:  Bandages keep your wound clean, dry, and protected from infection  They may also prevent swelling  Use a bandage that does not stick to your wound, and has a spongy layer to absorb fluids  Leave your bandage on as long as directed  Ask your healthcare provider when and how to change your bandage  Do not wrap the bandage too tightly  This could cut off blood flow and cause more injury  · Use cool compresses:  Wet a washcloth or towel with cool water and hold it on your wound as directed  Ask how often to apply the compress and for how long each time  · Reduce scarring:  Avoid direct sunlight on your wound  Sunlight may burn or change the color of the new skin over your wound  Use sunscreen (SPF 30 or higher) on the new skin for at least 1 year after it heals  Support for leg and arm wounds: You may need to use crutches if the wound is on your leg  You may need to use a sling if the wound is on your arm  Crutches and slings help protect the injured area, prevent further injury, and heal the area in the right position  Follow up with your healthcare provider within 2 days or as directed:   If you have stitches, ask when to return to have them removed  Write down your questions so you remember to ask them during your visits  Contact your healthcare provider if:   · You have new pain, or it gets worse  · You have trouble moving the injured body area  · Your wound splits open or does not seem to be healing  Return to the emergency department if:   · You have a fever  · You have painful swelling, redness, or warmth around your wound  · Your wound is red and there are red streaks on your skin starting at your wound and moving upward  · Your wound is draining pus  · You have heavy bleeding or bleeding that does not stop after 10 minutes of holding firm, direct pressure over the wound  · You feel like there is an object stuck in your wound  © Copyright [address] Information is for End User's use only and may not be sold, redistributed or otherwise used for commercial purposes  All illustrations and images included in CareNotes® are the copyrighted property of A D [employer] M [employer]  or [address] [place], [place] [address]bj   The above information is an  only  It is not intended as medical advice for individual conditions or treatments  Talk to your doctor, nurse or pharmacist before following any medical regimen to see if it is safe and effective for you

## 2021-03-18 NOTE — PROGRESS NOTES
NAME: Benito Mcgee is a 65 y o  male  : 1950    MRN: [id number]    /82   Pulse 79   Temp (!) 96 4 °F (35 8 °C) (Tympanic)   Resp 18   Ht 5' 10" (1 778 m)   Wt 70 3 kg (155 lb)   SpO2 98%   BMI 22 24 kg/m²     Assessment and Plan   Injury of right thumb, initial encounter [S69 [id number]]  1  Injury of right thumb, initial encounter     2  Avulsion of skin of right thumb, initial encounter         Tiffany Cline was seen today for finger laceration  Diagnoses and all orders for this visit:    Injury of right thumb, initial encounter    Avulsion of skin of right thumb, initial encounter      Gel foam placed on the avulsion, dressed with non adherent dressing and Yared  Finger splint given to patient to protect injury  Patient Instructions   Patient Instructions   Change dressing daily   Keep clean and dry  surgi- foam will fall off on its own  Watch for signs or infection, redness drainage and swelling return or go to the ER or see pcp      Skin Avulsion   WHAT YOU NEED TO KNOW:   Skin avulsion is a wound that happens when skin is torn from your body during an accident or other injury  The torn skin may be lost or too damaged to be repaired, and it must be removed  A wound of this type cannot be stitched closed because there is tissue missing  Avulsion wounds are usually bigger and have more scars because of the missing tissue  DISCHARGE INSTRUCTIONS:   Medicines:   · Antibiotic ointment:  Your healthcare provider may tell you to gently rub a topical antibiotic ointment on your wound  This will help prevent an infection and help your wound heal faster  · Pain medicine: You may be given medicine to take away or decrease pain  Do not wait until the pain is severe before you take your medicine  · NSAIDs , such as ibuprofen, help decrease swelling, pain, and fever  This medicine is available with or without a doctor's order  NSAIDs can cause stomach bleeding or kidney problems in certain people   If you take blood thinner medicine, always ask if NSAIDs are safe for you  Always read the medicine label and follow directions  Do not give these medicines to children under 10months of age without direction from your child's healthcare provider  · Take your medicine as directed  Contact your healthcare provider if you think your medicine is not helping or if you have side effects  Tell him of her if you are allergic to any medicine  Keep a list of the medicines, vitamins, and herbs you take  Include the amounts, and when and why you take them  Bring the list or the pill bottles to follow-up visits  Carry your medicine list with you in case of an emergency  Care for your wound:  Avulsion wounds may take longer to heal because they cannot be closed with tape or stitches  Keep your wound clean and protected to prevent infection and speed healing  · Clean your wound:  Wash your hands with soap and water before and after you care for your wound  You may be able to use a soft cloth to gently clean the wound after the first 24 to 48 hours  After that, gently clean the wound once or twice a day with cool water  Do not soak your wound  Use soap to clean around the wound, but try not to get any on the wound itself  Do not use alcohol or hydrogen peroxide to clean your wound unless you are directed to  Gently pat the area dry and reapply the bandage as directed  · Elevate your wound:  Prop your injured area on pillows to raise it above the level of your heart  This will help reduce pain and swelling  Do this for 30 minutes at a time, as often as you can  · Bandage your wound:  Bandages keep your wound clean, dry, and protected from infection  They may also prevent swelling  Use a bandage that does not stick to your wound, and has a spongy layer to absorb fluids  Leave your bandage on as long as directed  Ask your healthcare provider when and how to change your bandage  Do not wrap the bandage too tightly   This could cut off blood flow and cause more injury  · Use cool compresses:  Wet a washcloth or towel with cool water and hold it on your wound as directed  Ask how often to apply the compress and for how long each time  · Reduce scarring:  Avoid direct sunlight on your wound  Sunlight may burn or change the color of the new skin over your wound  Use sunscreen (SPF 30 or higher) on the new skin for at least 1 year after it heals  Support for leg and arm wounds: You may need to use crutches if the wound is on your leg  You may need to use a sling if the wound is on your arm  Crutches and slings help protect the injured area, prevent further injury, and heal the area in the right position  Follow up with your healthcare provider within 2 days or as directed: If you have stitches, ask when to return to have them removed  Write down your questions so you remember to ask them during your visits  Contact your healthcare provider if:   · You have new pain, or it gets worse  · You have trouble moving the injured body area  · Your wound splits open or does not seem to be healing  Return to the emergency department if:   · You have a fever  · You have painful swelling, redness, or warmth around your wound  · Your wound is red and there are red streaks on your skin starting at your wound and moving upward  · Your wound is draining pus  · You have heavy bleeding or bleeding that does not stop after 10 minutes of holding firm, direct pressure over the wound  · You feel like there is an object stuck in your wound  © Copyright [address] Information is for End User's use only and may not be sold, redistributed or otherwise used for commercial purposes  All illustrations and images included in CareNotes® are the copyrighted property of [employer]  or [facility] Cartwright   The above information is an  only   It is not intended as medical advice for individual conditions or treatments  Talk to your doctor, nurse or pharmacist before following any medical regimen to see if it is safe and effective for you  Proceed to the nearest ER if symptoms worsen, Follow up with your PCP  Continue to social distance, wash your hands, and wear your masks  Please continue to follow the CDC  gov guidelines daily for they are subject to change on COVID-19    Chief Complaint     Chief Complaint   Patient presents with    Finger Laceration     3/17/21 around 6pm - patient reports laceration the tip of his right thumb on a slicer while slciing potatoes  Last Tetanus 5/2020  History of Present Illness      Patient is a 49-year-old male who is here today with a laceration that occurred yesterday while slicing potatoes  The injury happened around 6:00 p m  to the tip of his right thumb came in to have it evaluated  His last tetanus was May 2020  He cleansed the wound at home put a bandage on it any tight Band-Aid  He went to bed woke up today to have it evaluated  The bleeding has stopped wound appears fresh in will have it dressed  Tetanus is up-to-date discussed signs and symptoms of infection to watch out for no antibiotic will be given today  Avulsion is on the right thumb medial aspect not involving the nail            Review of Systems   Review of Systems   Musculoskeletal: Negative  Skin: Positive for wound (right thumb)           Current Medications       Current Outpatient Medications:     albuterol (ACCUNEB) 1 25 MG/3ML nebulizer solution, Take 1 ampule by nebulization every 6 (six) hours as needed for wheezing, Disp: , Rfl:     aspirin (Aspirin 81) 81 mg chewable tablet, CHEW ONE TABLET BY MOUTH EVERY DAY, Disp: , Rfl:     atorvastatin (LIPITOR) 80 mg tablet, Take 40 mg by mouth, Disp: , Rfl:     Carboxymethylcellulose Sod PF 0 25 % SOLN, INSTILL ONE DROP BOTH EYES FOUR TIMES A DAY FOR DRY EYES, Disp: , Rfl:     Cholecalciferol 2000 units CAPS, Take 2,000 Units by mouth, Disp: , Rfl:     dextran 70-hypromellose (GENTEAL TEARS) 0 1-0 3 % ophthalmic solution, Apply 1 drop to eye, Disp: , Rfl:     diclofenac sodium (VOLTAREN) 1 %, Apply 2 g topically 4 (four) times a day as needed (pain) (Patient not taking: Reported on 3/15/2021), Disp: 100 g, Rfl: 3    folic acid (FOLVITE) 1 mg tablet, Take 1 tablet (1 mg total) by mouth daily, Disp: 90 tablet, Rfl: 3    ketotifen (ZADITOR) 0 025 % ophthalmic solution, INSTILL ONE DROP BOTH EYES TWICE A DAY, Disp: , Rfl:     methotrexate 2 5 mg tablet, 5 tablets po weekly (take all 5 tablets on the same day every week), Disp: 60 tablet, Rfl: 1    nitroglycerin (NITROSTAT) 0 4 mg SL tablet, Place 0 4 mg under the tongue, Disp: , Rfl:     omeprazole (PriLOSEC OTC) 20 MG tablet, Take 20 mg by mouth, Disp: , Rfl:     polyvinyl alcohol (LIQUIFILM TEARS) 1 4 % ophthalmic solution, Apply 1 drop to eye, Disp: , Rfl:     sertraline (ZOLOFT) 100 mg tablet, Take 100 mg by mouth, Disp: , Rfl:     tamsulosin (FLOMAX) 0 4 mg, Take 0 4 mg by mouth, Disp: , Rfl:     triamcinolone (KENALOG) 0 1 % ointment, Apply topically 2 (two) times a day, Disp: 80 g, Rfl: 3    Current Allergies     Allergies as of 03/18/2021 - Reviewed 03/18/2021   Allergen Reaction Noted    Cefadroxil Other (See Comments) 07/07/2004    Midazolam Other (See Comments) 12/20/2017    Prazosin  03/16/2015    Fentanyl Palpitations 02/02/2019              Past Medical History:   Diagnosis Date    Gout     Hypertension     Kidney stone     PTSD (post-traumatic stress disorder)     SVT (supraventricular tachycardia) (HCC)     Thyroid tumor, benign        Past Surgical History:   Procedure Laterality Date    CARDIAC ELECTROPHYSIOLOGY MAPPING AND ABLATION      SVT    PARATHYROIDECTOMY         Family History   Problem Relation Age of Onset    Coronary artery disease Brother     Hypertension Mother          Medications have been verified      The following portions of the patient's history were reviewed and updated as appropriate: allergies, current medications, past family history, past medical history, past social history, past surgical history and problem list     Objective   /82   Pulse 79   Temp (!) 96 4 °F (35 8 °C) (Tympanic)   Resp 18   Ht 5' 10" (1 778 m)   Wt 70 3 kg (155 lb)   SpO2 98%   BMI 22 24 kg/m²      Physical Exam     Physical Exam  Constitutional:       Appearance: Normal appearance  Skin:     Findings: Signs of injury (right thumb, avulsion to the medial aspect of distal thumb not involving the nail) present  Comments: Full ROM of the finger, normal sensation to the tip of finger and normal capillary refill   Neurological:      Mental Status: He is alert  Note: Portions of this record may have been created with voice recognition software  Occasional wrong word or "sound a like" substitutions may have occurred due to the inherent limitations of voice recognition software  Please read the chart carefully and recognize, using context, where substitutions have occurred  ILYA Reyes

## 2021-03-23 ENCOUNTER — OFFICE VISIT (OUTPATIENT)
Dept: OBGYN CLINIC | Facility: MEDICAL CENTER | Age: 71
End: 2021-03-23
Payer: MEDICARE

## 2021-03-23 ENCOUNTER — APPOINTMENT (OUTPATIENT)
Dept: RADIOLOGY | Facility: MEDICAL CENTER | Age: 71
End: 2021-03-23
Payer: MEDICARE

## 2021-03-23 VITALS
WEIGHT: 155 LBS | DIASTOLIC BLOOD PRESSURE: 83 MMHG | BODY MASS INDEX: 22.19 KG/M2 | HEART RATE: 82 BPM | HEIGHT: 70 IN | SYSTOLIC BLOOD PRESSURE: 153 MMHG

## 2021-03-23 DIAGNOSIS — M25.561 RIGHT KNEE PAIN, UNSPECIFIED CHRONICITY: ICD-10-CM

## 2021-03-23 DIAGNOSIS — G89.29 CHRONIC PAIN OF RIGHT KNEE: ICD-10-CM

## 2021-03-23 DIAGNOSIS — M25.561 CHRONIC PAIN OF RIGHT KNEE: ICD-10-CM

## 2021-03-23 DIAGNOSIS — M25.562 LEFT KNEE PAIN, UNSPECIFIED CHRONICITY: ICD-10-CM

## 2021-03-23 DIAGNOSIS — M17.12 PRIMARY OSTEOARTHRITIS OF LEFT KNEE: Primary | ICD-10-CM

## 2021-03-23 PROCEDURE — 99213 OFFICE O/P EST LOW 20 MIN: CPT | Performed by: ORTHOPAEDIC SURGERY

## 2021-03-23 PROCEDURE — 77073 BONE LENGTH STUDIES: CPT

## 2021-03-23 PROCEDURE — 73564 X-RAY EXAM KNEE 4 OR MORE: CPT

## 2021-03-23 PROCEDURE — 20610 DRAIN/INJ JOINT/BURSA W/O US: CPT | Performed by: ORTHOPAEDIC SURGERY

## 2021-03-23 RX ORDER — BUPIVACAINE HYDROCHLORIDE 2.5 MG/ML
4 INJECTION, SOLUTION INFILTRATION; PERINEURAL
Status: COMPLETED | OUTPATIENT
Start: 2021-03-23 | End: 2021-03-23

## 2021-03-23 RX ORDER — METHYLPREDNISOLONE ACETATE 40 MG/ML
1 INJECTION, SUSPENSION INTRA-ARTICULAR; INTRALESIONAL; INTRAMUSCULAR; SOFT TISSUE
Status: COMPLETED | OUTPATIENT
Start: 2021-03-23 | End: 2021-03-23

## 2021-03-23 RX ADMIN — BUPIVACAINE HYDROCHLORIDE 4 ML: 2.5 INJECTION, SOLUTION INFILTRATION; PERINEURAL at 09:09

## 2021-03-23 RX ADMIN — METHYLPREDNISOLONE ACETATE 1 ML: 40 INJECTION, SUSPENSION INTRA-ARTICULAR; INTRALESIONAL; INTRAMUSCULAR; SOFT TISSUE at 09:09

## 2021-03-23 NOTE — PROGRESS NOTES
Orthopaedic Surgery Note    CC: Left Knee Pain      HPI:  Aris Kaufman is a 65 y o male with a history of Left knee pain for few months  There is no injury or trauma  He localizes his pain to the anterior and posterior aspect  He describes tight this/ stiffness in the posterior aspect  Was also aching and sharp pain associated  He rates his pain at 8/10  This is exacerbated with bending the knee  His pain has been unchanged over time  This does wake him at night  He has not had any treatment at this time  He has a history of psoriatic arthritis for which she is taking methotrexate for           ALLERGIES:  Allergies   Allergen Reactions    Cefadroxil Other (See Comments)     bleeding internally    Midazolam Other (See Comments)     ARRHYTHMIA    Prazosin     Fentanyl Palpitations       CURRENT MEDICATIONS:  Current Outpatient Medications   Medication Sig Dispense Refill    albuterol (ACCUNEB) 1 25 MG/3ML nebulizer solution Take 1 ampule by nebulization every 6 (six) hours as needed for wheezing      aspirin (Aspirin 81) 81 mg chewable tablet CHEW ONE TABLET BY MOUTH EVERY DAY      atorvastatin (LIPITOR) 80 mg tablet Take 40 mg by mouth      Carboxymethylcellulose Sod PF 0 25 % SOLN INSTILL ONE DROP BOTH EYES FOUR TIMES A DAY FOR DRY EYES      Cholecalciferol 2000 units CAPS Take 2,000 Units by mouth      dextran 70-hypromellose (GENTEAL TEARS) 0 1-0 3 % ophthalmic solution Apply 1 drop to eye      folic acid (FOLVITE) 1 mg tablet Take 1 tablet (1 mg total) by mouth daily 90 tablet 3    ketotifen (ZADITOR) 0 025 % ophthalmic solution INSTILL ONE DROP BOTH EYES TWICE A DAY      methotrexate 2 5 mg tablet 5 tablets po weekly (take all 5 tablets on the same day every week) 60 tablet 1    nitroglycerin (NITROSTAT) 0 4 mg SL tablet Place 0 4 mg under the tongue      omeprazole (PriLOSEC OTC) 20 MG tablet Take 20 mg by mouth      polyvinyl alcohol (LIQUIFILM TEARS) 1 4 % ophthalmic solution Apply 1 drop to eye      sertraline (ZOLOFT) 100 mg tablet Take 100 mg by mouth      tamsulosin (FLOMAX) 0 4 mg Take 0 4 mg by mouth      triamcinolone (KENALOG) 0 1 % ointment Apply topically 2 (two) times a day 80 g 3    diclofenac sodium (VOLTAREN) 1 % Apply 2 g topically 4 (four) times a day as needed (pain) (Patient not taking: Reported on 3/15/2021) 100 g 3     No current facility-administered medications for this visit          PAST MEDICAL HISTORY  Past Medical History:   Diagnosis Date    Gout     Hypertension     Kidney stone     PTSD (post-traumatic stress disorder)     SVT (supraventricular tachycardia) (HCC)     Thyroid tumor, benign        SURGICAL HISTORY  Past Surgical History:   Procedure Laterality Date    CARDIAC ELECTROPHYSIOLOGY MAPPING AND ABLATION      SVT    PARATHYROIDECTOMY         FAMILY HISTORY  Family History   Problem Relation Age of Onset    Coronary artery disease Brother     Hypertension Mother        SOCIAL HISTORY  Social History     Socioeconomic History    Marital status:      Spouse name: Not on file    Number of children: Not on file    Years of education: Not on file    Highest education level: Not on file   Occupational History    Not on file   Social Needs    Financial resource strain: Not on file    Food insecurity     Worry: Not on file     Inability: Not on file    Transportation needs     Medical: Not on file     Non-medical: Not on file   Tobacco Use    Smoking status: Never Smoker    Smokeless tobacco: Never Used   Substance and Sexual Activity    Alcohol use: No    Drug use: No    Sexual activity: Not on file   Lifestyle    Physical activity     Days per week: Not on file     Minutes per session: Not on file    Stress: Not on file   Relationships    Social connections     Talks on phone: Not on file     Gets together: Not on file     Attends Hinduism service: Not on file     Active member of club or organization: Not on file Attends meetings of clubs or organizations: Not on file     Relationship status: Not on file    Intimate partner violence     Fear of current or ex partner: Not on file     Emotionally abused: Not on file     Physically abused: Not on file     Forced sexual activity: Not on file   Other Topics Concern    Not on file   Social History Narrative    Not on file         Review of Systems   Metal Allergy: no  History of MRSA: no  History of DVT or PE: yes  Active dental issues: no  Anesthesia complications: yes    Patient indicated positive for hay fever, autoimmune disease, vision problems, hearing loss, ringing in the ears, racing heart, wheezing, shortness of breath, heartburn, constipation, rash, new skin spots, skin infections, depression, anxiety, thyroid problems and orthopedic complaints noted above  Otherwise negative except per above and HPI  Physical Exam    Vitals  Vitals:    03/23/21 0836   BP: 153/83   Pulse: 82       BMI  Body mass index is 22 24 kg/m²  GENERAL: No acute distress  Alert and oriented  Well nourished and well hydrated  Appears stated age  HEENT : Normocephalic, atraumatic  Extraocular movements intact  Mask in place  NECK: Supple, trachea midline  LUNGS: Adequate and symmetric respiratory effort  No intercostal retractions or accessory muscle use  HEART: Extremities warm and perfused  ABDOMEN: Nondistended  SKIN: Warm and dry, no rash  Left  Knee   Inspection/Appearance:       Swelling: No      Patella is midline  Alignment:  Knee is in neutral  Palpation - Soft Tissue: normal without effusion  ROM:       Extension - 0          Flexion - 140      extensor lag: no    Stability:  demonstrates no varus, valgus, anterior drawer or posterior drawer  Patella: stable, tracks normally  Sensation Intact to Light Touch in Sural, Saphenous, Tibial, Superficial Peroneal, and Deep Peroneal Nerve Distribution     Motor function 5 out of 5 strength in Tibialis Anterior, Gastrocnemius, Soleus, Extensor Hallucis Longus, and Flexor Hallucis Longus Muscles  Extremity Warm and Well Perfused  Brisk Capillary Refill in Toes  Imaging  A) Imaging modality available  Radiographs: yes  MRI scan: no  CT scan: no    B) Imaging findings  Subchondral cysts: yes  Subchondral sclerosis: yes  Periarticular osteophytes: yes  Joint subluxation: no  Joint space narrowing: yes  Bone-on-bone articulations: no  Avascular necrosis: no    Large joint arthrocentesis: L knee  Universal Protocol:  Consent given by: patient  Time out: Immediately prior to procedure a "time out" was called to verify the correct patient, procedure, equipment, support staff and site/side marked as required  Timeout called at: 3/23/2021 9:08 AM   Supporting Documentation  Indications: pain   Procedure Details  Location: knee - L knee  Preparation: Patient was prepped and draped in the usual sterile fashion  Needle size: 20 G  Ultrasound guidance: no  Approach: anterior  Medications administered: 1 mL methylPREDNISolone acetate 40 mg/mL; 4 mL bupivacaine 0 25 %    Patient tolerance: patient tolerated the procedure well with no immediate complications  Dressing:  Sterile dressing applied            Assessment and Plan  Left  Knee Arthritis    -CSI was offered today for the left knee  Risks of the injection including pain, infection, tendon rupture, progression of arthritis, fat atrophy, depigmentation, and worsening of symptoms were all discussed with the patient  There was good understanding by the patient and they wish to proceed  - PT was ordered today      Knee Pain / Arthritis Treatment Recommendations    Strengthening Exercises  10 repetitions each leg Monday, Wednesday, Friday OR Tuesday, Thursday, Saturday  Increase 10 repetitions per week  1  Straight leg raises (SLR)  2   VMO Modification SLR - (Rotate hip out externally) Do if SLR becomes easy    Exercise - 5 minutes per day Monday, Wednesday, Friday OR Tuesday, Thursday, Saturday  Increase 5 minutes per day per week  May use bike (non-impact) or walk (impact) or swim or alternate  Goal is to get up to at least 30 minutes per day  1  Bicycle  2  Walking    Weight loss   Goal to lose 1 pound per week  Portion control, limit sweets, limit carbs    Medications  Anti-inflammatories (NSAIDs)  1  Ibuprofen (Motrin or Advil) 600 mg (3 pills) with food 3 times a day for 3 - 7 days  OR  2  Naprosyn (Aleve) 1 - 2 pills twice a day with food for 3 - 7 days  Stop if you develop upset stomach or bleeding occurs  We discussed that scheduled NSAIDs for greater than 1 week should be discussed with PCP to monitor for potential side effects  Pain reliever  1  Acetaminophen (Tylenol) 2 pills (regular or extra-strength) 3 times a day for 3 - 7 days    Taken together (Acetaminophen with one of the NSAIDs (ibuprofen OR naprosyn)), the combination may work better than either one alone for more acute pain    Other  Braces, wraps, ice, heat, topical ointments may all be used/tried if they help pain/symptoms          Lamonte Rushing MD  Adult Reconstruction Surgery  Department of [place] [id number]  8:47 AM

## 2021-04-22 ENCOUNTER — OFFICE VISIT (OUTPATIENT)
Dept: RHEUMATOLOGY | Facility: CLINIC | Age: 71
End: 2021-04-22
Payer: MEDICARE

## 2021-04-22 ENCOUNTER — TRANSCRIBE ORDERS (OUTPATIENT)
Dept: ADMINISTRATIVE | Facility: HOSPITAL | Age: 71
End: 2021-04-22

## 2021-04-22 ENCOUNTER — TELEPHONE (OUTPATIENT)
Dept: RHEUMATOLOGY | Facility: CLINIC | Age: 71
End: 2021-04-22

## 2021-04-22 ENCOUNTER — APPOINTMENT (OUTPATIENT)
Dept: LAB | Facility: MEDICAL CENTER | Age: 71
End: 2021-04-22
Payer: MEDICARE

## 2021-04-22 ENCOUNTER — TELEPHONE (OUTPATIENT)
Dept: OBGYN CLINIC | Facility: HOSPITAL | Age: 71
End: 2021-04-22

## 2021-04-22 VITALS
HEART RATE: 63 BPM | DIASTOLIC BLOOD PRESSURE: 82 MMHG | TEMPERATURE: 99.5 F | SYSTOLIC BLOOD PRESSURE: 148 MMHG | HEIGHT: 70 IN | WEIGHT: 155 LBS | BODY MASS INDEX: 22.19 KG/M2

## 2021-04-22 DIAGNOSIS — Z79.899 HIGH RISK MEDICATION USE: ICD-10-CM

## 2021-04-22 DIAGNOSIS — L40.50 PSORIATIC ARTHRITIS (HCC): Primary | ICD-10-CM

## 2021-04-22 DIAGNOSIS — L40.9 PSORIASIS: ICD-10-CM

## 2021-04-22 DIAGNOSIS — Z11.59 ENCOUNTER FOR SCREENING FOR OTHER VIRAL DISEASES: ICD-10-CM

## 2021-04-22 DIAGNOSIS — Z12.5 PROSTATE CANCER SCREENING: Primary | ICD-10-CM

## 2021-04-22 DIAGNOSIS — Z12.5 PROSTATE CANCER SCREENING: ICD-10-CM

## 2021-04-22 LAB
HBV CORE AB SER QL: NORMAL
HBV CORE IGM SER QL: NORMAL
HBV SURFACE AG SER QL: NORMAL
HCV AB SER QL: NORMAL

## 2021-04-22 PROCEDURE — 99214 OFFICE O/P EST MOD 30 MIN: CPT | Performed by: INTERNAL MEDICINE

## 2021-04-22 PROCEDURE — 87340 HEPATITIS B SURFACE AG IA: CPT | Performed by: INTERNAL MEDICINE

## 2021-04-22 PROCEDURE — 86704 HEP B CORE ANTIBODY TOTAL: CPT | Performed by: INTERNAL MEDICINE

## 2021-04-22 PROCEDURE — 36415 COLL VENOUS BLD VENIPUNCTURE: CPT | Performed by: INTERNAL MEDICINE

## 2021-04-22 PROCEDURE — 86480 TB TEST CELL IMMUN MEASURE: CPT | Performed by: INTERNAL MEDICINE

## 2021-04-22 PROCEDURE — 86803 HEPATITIS C AB TEST: CPT | Performed by: INTERNAL MEDICINE

## 2021-04-22 PROCEDURE — 86705 HEP B CORE ANTIBODY IGM: CPT | Performed by: INTERNAL MEDICINE

## 2021-04-22 RX ORDER — SULFASALAZINE 500 MG/1
TABLET ORAL
Qty: 120 TABLET | Refills: 3 | Status: SHIPPED | OUTPATIENT
Start: 2021-04-22 | End: 2021-07-15

## 2021-04-22 RX ORDER — CLOBETASOL PROPIONATE 0.5 MG/G
CREAM TOPICAL 2 TIMES DAILY
Qty: 30 G | Refills: 0 | Status: SHIPPED | OUTPATIENT
Start: 2021-04-22 | End: 2021-07-20 | Stop reason: ALTCHOICE

## 2021-04-22 NOTE — TELEPHONE ENCOUNTER
I reached out to the [facility] lab @ [phone], spoke with Jesus Hanley  She explained Vasile Hector had just left, her shift ended at noon  Jesus Hanley was not familiar with the situation  I provided my direct number for Vasile Hector to call me back tomorrow  I did let Jesus Hanley know to make note I come in tomorrow at 1030

## 2021-04-22 NOTE — TELEPHONE ENCOUNTER
If you get a hold of him, he basically needs some more labs done that are in the system (I clarified with the lab the issue), so he can get those done at the same time he comes to sign the [facility] Form

## 2021-04-22 NOTE — PROGRESS NOTES
Assessment and Plan: Vada Brunner is a 65 y o   male who presents for follow-up of his psoriatic arthritis, which is not controlled  Patient self-discontinued methotrexate 2-3 weeks ago, since it was not helping his psoriasis or arthritis symptoms  Prescribed clobetasol cream for him to apply to his psoriasis as needed  Reminded patient to schedule dermatology appointment  To address his psoriatic arthritis symptoms, initiated patient on sulfasalazine to be ultimately increased to the therapeutic dose of 1,000 mg p o  b i d   Patient will also benefit from the addition of Che Flake for management of both his psoriasis and psoriatic arthritis; will work on prior [place]  He has failed methotrexate  Of note, he has had positive TB test in the past, but has not had history of either active or latent TB  Today's TB test returned positive as well but subsequent chest x-ray returned normal   Viral hepatitis panel is negative  Patient is up to date on his immunizations, and has no active infections  Plan:  Diagnoses and all orders for this visit:    [facility]  -     sulfaSALAzine (AZULFIDINE) 500 mg tablet; Take 1 tab daily for a week, then 1 tab twice a day for a week, then 2 tabs twice a day  -     CBC and differential  -     Comprehensive metabolic panel  -     C-reactive protein  -     Sedimentation rate, automated    Psoriasis  -     clobetasol (TEMOVATE) 0 05 % cream; Apply topically 2 (two) times a day    High risk medication use  -     Quantiferon TB Gold Plus  -     Chronic Hepatitis Panel  -     CBC and differential  -     Comprehensive metabolic panel    Encounter for screening for other viral diseases   -     Chronic Hepatitis Panel    Follow-up plan: Return to clinic in 3 months        Rheumatic Disease Summary  Pallavi yoon a 65 y  o  male who originally presented on 4/2/20 as a Rheumatology consult referred by his Alvaro Crews MD for evaluation of possible inflammatory arthritis involving the hands  Patient's clinical picture of history of asymmetric painful and swollen joints of hands, and psoriasis seemed consistent with psoriatic arthritis  His DAS28 score at initial visit was 3 32, consistent with moderate inflammatory disease activity  However, since patient's arthritis symptoms were not bothering him at the time, decided to conservatively approach treatment with diclofenac gel to apply to painful joints as needed  For his psoriasis, mainly involving his head, he can continue his current steroid ointment as needed  Ordered inflammatory markers and HLA-B27 for psoriatic arthritis work-up; HLA-B27 returned negative but his ESR was slightly elevated  He next presented on 7/14/20 for follow-up of likely psoriatic arthritis  Ordered [facility] hand ultrasound to evaluate for extent of inflammatory arthritis changes, which showed significant psoriatic arthritis changes  Continued diclofenac gel prn for joint pain, and Kenalog ointment prn for psoriasis  Started him on methotrexate 10mg po weekly with daily folic acid  He followed up in 12/2020 with Dr Sharlene Israel, who increased his methotrexate to 15mg po weekly with daily folic acid      YADI Daniels is a 65 y o   male who presents for follow-up of his psoriatic arthritis  Last clinic visit was in 12/2020 with Dr Sharlene Israel, at which time his methotrexate was increased to 15mg po weekly  Patient admits that he recently had a left knee steroid injection that significantly helped his knee pain  He admits that he stopped methotrexate 2-3 weeks ago, because did not think it was helping  He has been applying clobetasol cream for his psoriasis  He admits that he has had not joint pain lately except for left knee morning stiffness for 1 hour      The following portions of the patient's history were reviewed and updated as appropriate: allergies, current medications, past family history, past medical history, past social history, past surgical history and problem list     Review of Systems:   Review of Systems   Constitutional: Negative for chills, fatigue, fever and unexpected weight change  HENT: Negative for mouth sores and trouble swallowing  Eyes: Negative for pain and visual disturbance  Dry eyes   Respiratory: Negative for cough and shortness of breath  Cardiovascular: Negative for chest pain and leg swelling  Gastrointestinal: Negative for constipation and diarrhea  Musculoskeletal: Positive for arthralgias, back pain and joint swelling  Negative for myalgias  Skin: Positive for rash  Negative for color change  Allergic/Immunologic: Positive for environmental allergies  Neurological: Negative for weakness  Hematological: Negative for adenopathy  Psychiatric/Behavioral: Negative for sleep disturbance         Home Medications:    Current Outpatient Medications:     albuterol (ACCUNEB) 1 25 MG/3ML nebulizer solution, Take 1 ampule by nebulization every 6 (six) hours as needed for wheezing, Disp: , Rfl:     atorvastatin (LIPITOR) 80 mg tablet, Take 40 mg by mouth, Disp: , Rfl:     Carboxymethylcellulose Sod PF 0 25 % SOLN, INSTILL ONE DROP BOTH EYES FOUR TIMES A DAY FOR DRY EYES, Disp: , Rfl:     Cholecalciferol 2000 units CAPS, Take 2,000 Units by mouth, Disp: , Rfl:     dextran 70-hypromellose (GENTEAL TEARS) 0 1-0 3 % ophthalmic solution, Apply 1 drop to eye, Disp: , Rfl:     ketotifen (ZADITOR) 0 025 % ophthalmic solution, INSTILL ONE DROP BOTH EYES TWICE A DAY, Disp: , Rfl:     nitroglycerin (NITROSTAT) 0 4 mg SL tablet, Place 0 4 mg under the tongue, Disp: , Rfl:     polyvinyl alcohol (LIQUIFILM TEARS) 1 4 % ophthalmic solution, Apply 1 drop to eye, Disp: , Rfl:     sertraline (ZOLOFT) 100 mg tablet, Take 100 mg by mouth, Disp: , Rfl:     aspirin (Aspirin 81) 81 mg chewable tablet, CHEW ONE TABLET BY MOUTH EVERY DAY, Disp: , Rfl:     clobetasol (TEMOVATE) 0 05 % cream, Apply topically 2 (two) times a day, Disp: 30 g, Rfl: 0    diclofenac sodium (VOLTAREN) 1 %, Apply 2 g topically 4 (four) times a day as needed (pain) (Patient not taking: Reported on 3/15/2021), Disp: 100 g, Rfl: 3    omeprazole (PriLOSEC OTC) 20 MG tablet, Take 20 mg by mouth, Disp: , Rfl:     sulfaSALAzine (AZULFIDINE) 500 mg tablet, Take 1 tab daily for a week, then 1 tab twice a day for a week, then 2 tabs twice a day, Disp: 120 tablet, Rfl: 3    tamsulosin (FLOMAX) 0 4 mg, Take 0 4 mg by mouth, Disp: , Rfl:     Objective:    Vitals:    04/22/21 1030   BP: 148/82   BP Location: Left arm   Patient Position: Sitting   Cuff Size: Standard   Pulse: 63   Temp: 99 5 °F (37 5 °C)   TempSrc: Temporal   Weight: 70 3 kg (155 lb)   Height: 5' 10" (1 778 m)       Physical Exam  Constitutional:       General: He is not in acute distress  Appearance: He is well-developed  HENT:      Head: Normocephalic and atraumatic  Eyes:      General: Lids are normal  No scleral icterus  Conjunctiva/sclera: Conjunctivae normal    Neck:      Musculoskeletal: Neck supple  No muscular tenderness  Cardiovascular:      Rate and Rhythm: Normal rate and regular rhythm  Heart sounds: S1 normal and S2 normal  No murmur  No friction rub  Pulmonary:      Effort: Pulmonary effort is normal  No tachypnea or respiratory distress  Breath sounds: Normal breath sounds  No wheezing, rhonchi or rales  Musculoskeletal:         General: Swelling and tenderness present  Comments: R 1st MCP swelling/tenderness; L knee swelling/warmth   Skin:     General: Skin is warm and dry  Findings: No rash  Nails: There is no clubbing  Comments: rash not present currently   Neurological:      Mental Status: He is alert  Sensory: No sensory deficit  Psychiatric:         Behavior: Behavior normal  Behavior is cooperative  Reviewed labs and imaging  Imaging:   Left Knee x-rays 3/23/21  There is a small joint effusion    Mild osteoarthritis with narrowing of the medial tibiofemoral joint and small osteophytes seen  [facility] Bilateral Hand Ultrasound 7/23/20  IMPRESSION:  There is evidence of inflammatory arthritis in the following joints:     Right 1st metacarpophalangeal joint: Mild synovial hypertrophy      Right 2nd metacarpophalangeal joint: Moderate synovial hypertrophy and mild synovial hyperemia      Right 2nd PIP joint: Mild synovial hypertrophy, moderate synovial hyperemia, small joint effusion      Right 3rd metacarpophalangeal joint: Mild synovial hypertrophy, severe synovial hyperemia      Left 2nd metacarpophalangeal joint: Mild synovial hypertrophy      The asymmetric, distal pattern is in favor of psoriatic arthritis  Right Hand x-rays 1/13/20  IMPRESSION:  Changes of arthritis most advanced at the 2nd DIP joint, and periarticular soft tissue swelling particularly the 2nd finger   Soft tissue swelling findings are suspicious for an inflammatory arthropathy  Labs:   Office Visit on 04/22/2021   Component Date Value Ref Range Status    QFT Nil 04/22/2021 0 75  0 - 8 0 IU/ml Final    QFT TB1-NIL 04/22/2021 >10 00  IU/ml Final    QFT TB2-NIL 04/22/2021 >10 00  IU/ml Final    QFT Mitogen-NIL 04/22/2021 >10 00  IU/ml Final    QFT Final Interpretation 04/22/2021 Positive* Negative Final    Interferon-gamma response to M  tuberculosis antigens detected suggesting infection with M  tuberculosis  Quantiferon TB Gold Plus is an indirect test for M  tuberculosis infection and is intended for use in conjunction with risk assessment, radiography and other medical and diagnostic evaluations  Positive results in patients at low risk for TB should be interpreted with caution and repeat testing on a new sample should be considered as recommended by 2017 AST/IDSA/CDC Clinical Practice Guideline for Diagnosis of Tuberculosis in Adults and [place]  False positive results may occur in patients with prior infection with M  marinum, M  tongmariama or ADIS pimentel      Hepatitis B Surface Ag 04/22/2021 Non-reactive  Non-reactive, NonReactive - Confirmed Final    Hepatitis C Ab 04/22/2021 Non-reactive  Non-reactive Final    Hep B C IgM 04/22/2021 Non-reactive  Non-reactive Final    Hep B Core Total Ab 04/22/2021 Non-reactive  Non-reactive Final    WBC 04/23/2021 5 23  4 31 - 10 16 Thousand/uL Final    RBC 04/23/2021 4 66  3 88 - 5 62 Million/uL Final    Hemoglobin 04/23/2021 14 7  12 0 - 17 0 g/dL Final    Hematocrit 04/23/2021 45 9  36 5 - 49 3 % Final    MCV 04/23/2021 99* 82 - 98 fL Final    MCH 04/23/2021 31 5  26 8 - 34 3 pg Final    MCHC 04/23/2021 32 0  31 4 - 37 4 g/dL Final    RDW 04/23/2021 12 9  11 6 - 15 1 % Final    MPV 04/23/2021 11 1  8 9 - 12 7 fL Final    Platelets 48/65/6934 233  149 - 390 Thousands/uL Final    nRBC 04/23/2021 0  /100 WBCs Final    Neutrophils Relative 04/23/2021 64  43 - 75 % Final    Immat GRANS % 04/23/2021 0  0 - 2 % Final    Lymphocytes Relative 04/23/2021 22  14 - 44 % Final    Monocytes Relative 04/23/2021 9  4 - 12 % Final    Eosinophils Relative 04/23/2021 4  0 - 6 % Final    Basophils Relative 04/23/2021 1  0 - 1 % Final    Neutrophils Absolute 04/23/2021 3 37  1 85 - 7 62 Thousands/µL Final    Immature Grans Absolute 04/23/2021 0 02  0 00 - 0 20 Thousand/uL Final    Lymphocytes Absolute 04/23/2021 1 13  0 60 - 4 47 Thousands/µL Final    Monocytes Absolute 04/23/2021 0 45  0 17 - 1 22 Thousand/µL Final    Eosinophils Absolute 04/23/2021 0 22  0 00 - 0 61 Thousand/µL Final    Basophils Absolute 04/23/2021 0 04  0 00 - 0 10 Thousands/µL Final    Sodium 04/23/2021 140  136 - 145 mmol/L Final    Potassium 04/23/2021 4 6  3 5 - 5 3 mmol/L Final    Chloride 04/23/2021 108  100 - 108 mmol/L Final    CO2 04/23/2021 31  21 - 32 mmol/L Final    ANION GAP 04/23/2021 1* 4 - 13 mmol/L Final    BUN 04/23/2021 16  5 - 25 mg/dL Final    Creatinine 04/23/2021 0 90  0 60 - 1 30 mg/dL Final Standardized to IDMS reference method    Glucose 04/23/2021 150* 65 - 140 mg/dL Final    If the patient is fasting, the ADA then defines impaired fasting glucose as > 100 mg/dL and diabetes as > or equal to 123 mg/dL  Specimen collection should occur prior to Sulfasalazine administration due to the potential for falsely depressed results  Specimen collection should occur prior to Sulfapyridine administration due to the potential for falsely elevated results   Calcium 04/23/2021 9 6  8 3 - 10 1 mg/dL Final    AST 04/23/2021 29  5 - 45 U/L Final    Specimen collection should occur prior to Sulfasalazine administration due to the potential for falsely depressed results   ALT 04/23/2021 47  12 - 78 U/L Final    Specimen collection should occur prior to Sulfasalazine and/or Sulfapyridine administration due to the potential for falsely depressed results   Alkaline Phosphatase 04/23/2021 95  46 - 116 U/L Final    Total Protein 04/23/2021 7 2  6 4 - 8 2 g/dL Final    Albumin 04/23/2021 3 7  3 5 - 5 0 g/dL Final    Total Bilirubin 04/23/2021 0 36  0 20 - 1 00 mg/dL Final    Use of this assay is not recommended for patients undergoing treatment with eltrombopag due to the potential for falsely elevated results      eGFR 04/23/2021 85  ml/min/1 73sq m Final    CRP 04/23/2021 <3 0  <3 0 mg/L Final    Sed Rate 04/23/2021 8  0 - 19 mm/hour Final

## 2021-04-22 NOTE — PATIENT INSTRUCTIONS
Do labs today  Use clobetasol cream as needed for psoriasis  Schedule dermatology appointment  Start sulfasalazine 1 tab daily for a week, then 1 tab twice a day for a week, then 2 tabs twice a day  Will work on getting Elvia ibarra    Return to clinic in 3 months    Psoriatic Arthritis in Adults    What is psoriatic arthritis? -- Psoriatic arthritis is a condition that causes joint pain, swelling, and stiffness  It happens in people who have a long-term skin condition called psoriasis  People with psoriasis have patches of thick, red skin that are often covered by silver or white scales  Doctors don't know what causes psoriasis or psoriatic arthritis  What are the symptoms of psoriatic arthritis? -- Psoriatic arthritis causes pain, stiffness, and swelling in the affected joints  It can also affect the spine in some people  Because of the joint and spine problems, people can have trouble moving their body  Stiffness in the joints or low back is usually worse in the morning and lasts 30 minutes or longer  It usually gets better with exercise  Psoriatic arthritis can affect joints on one or both sides of the body  It usually affects more than one joint  In addition to joint symptoms (and the skin symptoms of psoriasis), people sometimes have other symptoms  These can include:  ? Swelling of a finger or toe, or the hands or feet  ? Swelling and pain in the back of the ankle or in the heel   ? Nail symptoms - The nails can look "pitted," as if they were pricked by a pin  The nail can also come up off the nail bed  ? Eye pain or redness  Is there a test for psoriatic arthritis? -- Yes  Your doctor or nurse will ask about your symptoms and do an exam  He or she will order X-rays of your painful joints  He or she might order an imaging test called an MRI  Imaging tests create pictures of the inside of the body    To check that another condition isn't causing your symptoms, your doctor or nurse might also order:  ?Blood tests  ? Lab tests on a sample of fluid from a swollen joint - To get a sample of fluid, the doctor will put a thin needle in your joint  How is psoriatic arthritis treated? -- There is no cure for psoriatic arthritis, but different treatments can help ease and control symptoms  Treatment for joint symptoms usually involves one or more of the following:  ?Medicines called nonsteroidal antiinflammatory drugs, or "NSAIDs" for short - Examples of NSAIDs are aspirin, ibuprofen (sample brand names: Advil, Motrin), and naproxen (sample brand name: Aleve)  ? Medicines that are usually used to treat other types of arthritis - Some of these include methotrexate and leflunomide  ? Medicines that block a substance called tumor necrosis factor, or "TNF" for short - TNF plays a role in psoriasis and psoriatic arthritis  Medicines that block TNF are called "anti-TNF" medicines  Examples include etanercept (brand name: Enbrel) and adalimumab (brand name: Humira)  ?Other medicines - If the options above don't help, your doctor might suggest trying a different medicine  Examples include ustekinumab (brand name: Emely Oneal), secukinumab (brand name: Cosentyx), tofacitinib (brand name: Johncarmelita Woods), abatacept (brand name: Luz Maria Campa), and apremilast (brand name: [facility])  ? Shots of medicines called steroids that go into the painful joint - These are not the same as the steroids some athletes take illegally  These steroids help reduce swelling and pain  ? Heat - Heat, especially in the morning, can help reduce pain and stiffness  Do not use heat for longer than 20 minutes at a time  Also, do not use anything too hot that could burn your skin  ? Physical and occupational therapy - This involves learning exercises, movements, and ways of doing everyday tasks  ? Special shoe inserts (called "orthotics") - These can help keep your feet, ankles, and knees in the proper position  ?Treatment for psoriatic arthritis is usually long term   That's because even after symptoms get better, they sometimes return later on  Is there anything I can do on my own to feel better? -- Yes  It is very important that you stay active  You might want to avoid being active because you are in pain  But this can make things worse  It can make your muscles weak and your joints stiffer than they already are  Your doctor, nurse, or physical therapist can help you figure out which activities and exercises are right for you

## 2021-04-22 NOTE — TELEPHONE ENCOUNTER
Patient sees Dr Mendel Critchley Ethyl Kayser from the lab is calling requesting to speak with Dr Mendel Critchley or MA regarding lab work that patient came in with today  She stated the orders weren't linked then they were canceled and has questions if they need to be reordered  She is requesting a callback as soon as possible          CB: [phone]

## 2021-04-22 NOTE — TELEPHONE ENCOUNTER
Patient had an appt today 4/22 and completed an [address] form with Dr Junior Pittman, however Dr Junior Pittman forgot to have the patient sign the form  I left a message asking for patient to call me back to let me know if he is able to come back at any point to sign it

## 2021-04-23 ENCOUNTER — APPOINTMENT (OUTPATIENT)
Dept: LAB | Facility: MEDICAL CENTER | Age: 71
End: 2021-04-23
Payer: MEDICARE

## 2021-04-23 LAB
ALBUMIN SERPL BCP-MCNC: 3.7 G/DL (ref 3.5–5)
ALP SERPL-CCNC: 95 U/L (ref 46–116)
ALT SERPL W P-5'-P-CCNC: 47 U/L (ref 12–78)
ANION GAP SERPL CALCULATED.3IONS-SCNC: 1 MMOL/L (ref 4–13)
AST SERPL W P-5'-P-CCNC: 29 U/L (ref 5–45)
BASOPHILS # BLD AUTO: 0.04 THOUSANDS/ΜL (ref 0–0.1)
BASOPHILS NFR BLD AUTO: 1 % (ref 0–1)
BILIRUB SERPL-MCNC: 0.36 MG/DL (ref 0.2–1)
BUN SERPL-MCNC: 16 MG/DL (ref 5–25)
CALCIUM SERPL-MCNC: 9.6 MG/DL (ref 8.3–10.1)
CHLORIDE SERPL-SCNC: 108 MMOL/L (ref 100–108)
CO2 SERPL-SCNC: 31 MMOL/L (ref 21–32)
CREAT SERPL-MCNC: 0.9 MG/DL (ref 0.6–1.3)
CRP SERPL QL: <3 MG/L
EOSINOPHIL # BLD AUTO: 0.22 THOUSAND/ΜL (ref 0–0.61)
EOSINOPHIL NFR BLD AUTO: 4 % (ref 0–6)
ERYTHROCYTE [DISTWIDTH] IN BLOOD BY AUTOMATED COUNT: 12.9 % (ref 11.6–15.1)
ERYTHROCYTE [SEDIMENTATION RATE] IN BLOOD: 8 MM/HOUR (ref 0–19)
GFR SERPL CREATININE-BSD FRML MDRD: 85 ML/MIN/1.73SQ M
GLUCOSE SERPL-MCNC: 150 MG/DL (ref 65–140)
HCT VFR BLD AUTO: 45.9 % (ref 36.5–49.3)
HGB BLD-MCNC: 14.7 G/DL (ref 12–17)
IMM GRANULOCYTES # BLD AUTO: 0.02 THOUSAND/UL (ref 0–0.2)
IMM GRANULOCYTES NFR BLD AUTO: 0 % (ref 0–2)
LYMPHOCYTES # BLD AUTO: 1.13 THOUSANDS/ΜL (ref 0.6–4.47)
LYMPHOCYTES NFR BLD AUTO: 22 % (ref 14–44)
MCH RBC QN AUTO: 31.5 PG (ref 26.8–34.3)
MCHC RBC AUTO-ENTMCNC: 32 G/DL (ref 31.4–37.4)
MCV RBC AUTO: 99 FL (ref 82–98)
MONOCYTES # BLD AUTO: 0.45 THOUSAND/ΜL (ref 0.17–1.22)
MONOCYTES NFR BLD AUTO: 9 % (ref 4–12)
NEUTROPHILS # BLD AUTO: 3.37 THOUSANDS/ΜL (ref 1.85–7.62)
NEUTS SEG NFR BLD AUTO: 64 % (ref 43–75)
NRBC BLD AUTO-RTO: 0 /100 WBCS
PLATELET # BLD AUTO: 233 THOUSANDS/UL (ref 149–390)
PMV BLD AUTO: 11.1 FL (ref 8.9–12.7)
POTASSIUM SERPL-SCNC: 4.6 MMOL/L (ref 3.5–5.3)
PROT SERPL-MCNC: 7.2 G/DL (ref 6.4–8.2)
RBC # BLD AUTO: 4.66 MILLION/UL (ref 3.88–5.62)
SODIUM SERPL-SCNC: 140 MMOL/L (ref 136–145)
WBC # BLD AUTO: 5.23 THOUSAND/UL (ref 4.31–10.16)

## 2021-04-23 PROCEDURE — 36415 COLL VENOUS BLD VENIPUNCTURE: CPT | Performed by: INTERNAL MEDICINE

## 2021-04-23 PROCEDURE — 80053 COMPREHEN METABOLIC PANEL: CPT | Performed by: INTERNAL MEDICINE

## 2021-04-23 PROCEDURE — 85025 COMPLETE CBC W/AUTO DIFF WBC: CPT | Performed by: INTERNAL MEDICINE

## 2021-04-23 PROCEDURE — 85652 RBC SED RATE AUTOMATED: CPT | Performed by: INTERNAL MEDICINE

## 2021-04-23 PROCEDURE — 86140 C-REACTIVE PROTEIN: CPT | Performed by: INTERNAL MEDICINE

## 2021-04-23 NOTE — TELEPHONE ENCOUNTER
I spoke with the patient  Informed about needing to sign the form  He will come over now he said  I also let him know additional labs are also needed so if he has time while he is here he can have them done, the orders are in his chart

## 2021-04-26 LAB
GAMMA INTERFERON BACKGROUND BLD IA-ACNC: 0.75 IU/ML
M TB IFN-G BLD-IMP: POSITIVE
M TB IFN-G CD4+ BCKGRND COR BLD-ACNC: >10 IU/ML
M TB IFN-G CD4+ BCKGRND COR BLD-ACNC: >10 IU/ML
MITOGEN IGNF BCKGRD COR BLD-ACNC: >10 IU/ML

## 2021-04-27 ENCOUNTER — TELEPHONE (OUTPATIENT)
Dept: OBGYN CLINIC | Facility: HOSPITAL | Age: 71
End: 2021-04-27

## 2021-04-27 DIAGNOSIS — R76.12 POSITIVE QUANTIFERON-TB GOLD TEST: Primary | ICD-10-CM

## 2021-04-27 NOTE — TELEPHONE ENCOUNTER
Alexia from [facility] is calling because the form that was faxed to them said please see attached but nothing was with it  Jimbo Harris is asking someone to contact her with patient's insurance information  Please fax the information to her at [phone]       [phone]

## 2021-04-27 NOTE — TELEPHONE ENCOUNTER
I received the form, I have faxed them copies of the patient's insurance cards that I have on file - which is Medicare and [employer]

## 2021-04-28 ENCOUNTER — APPOINTMENT (OUTPATIENT)
Dept: RADIOLOGY | Age: 71
End: 2021-04-28
Payer: MEDICARE

## 2021-04-28 DIAGNOSIS — R76.12 POSITIVE QUANTIFERON-TB GOLD TEST: ICD-10-CM

## 2021-04-28 PROCEDURE — 71046 X-RAY EXAM CHEST 2 VIEWS: CPT

## 2021-05-05 ENCOUNTER — CONSULT (OUTPATIENT)
Dept: DERMATOLOGY | Facility: CLINIC | Age: 71
End: 2021-05-05
Payer: MEDICARE

## 2021-05-05 VITALS — BODY MASS INDEX: 22.33 KG/M2 | WEIGHT: 156 LBS | HEIGHT: 70 IN | TEMPERATURE: 98 F

## 2021-05-05 DIAGNOSIS — L81.4 LENTIGO: ICD-10-CM

## 2021-05-05 DIAGNOSIS — L40.9 PSORIASIS: ICD-10-CM

## 2021-05-05 DIAGNOSIS — L85.3 XEROSIS OF SKIN: ICD-10-CM

## 2021-05-05 DIAGNOSIS — L82.1 SEBORRHEIC KERATOSIS: ICD-10-CM

## 2021-05-05 DIAGNOSIS — D18.01 CHERRY ANGIOMA: ICD-10-CM

## 2021-05-05 DIAGNOSIS — D22.9 MULTIPLE MELANOCYTIC NEVI: Primary | ICD-10-CM

## 2021-05-05 PROCEDURE — 99203 OFFICE O/P NEW LOW 30 MIN: CPT | Performed by: DERMATOLOGY

## 2021-05-05 NOTE — PROGRESS NOTES
[facility] Note     Patient Name: Tyra Daniels  Encounter Date: 5/5/2021     Have you been cared for by a [facility] Dermatologist in the last 3 years and, if so, which one? No    · Have you traveled outside of the [address] [place] [address] in the past 3 months or outside of the [facility] area in the last 2 weeks? No     May we call your Preferred Phone number to discuss your specific medical information? Yes     May we leave a detailed message that includes your specific medical information? Yes      Today's Chief Concerns:   Concern #1:  Psoriatic Arthritis   Concern #2:      Past Medical History:  Have you personally ever had or currently have any of the following? · Skin cancer (such as Melanoma, Basal Cell Carcinoma, Squamous Cell Carcinoma? (If Yes, please provide more detail)- No  · Eczema: No  · Psoriasis: YES  · HIV/AIDS: No  · Hepatitis B or C: No  · Tuberculosis: No  · Systemic Immunosuppression such as Diabetes, Biologic or Immunotherapy, Chemotherapy, Organ Transplantation, Bone Marrow Transplantation (If YES, please provide more detail): No  · Radiation Treatment (If YES, please provide more detail): No  · Any other major medical conditions/concerns? (If Yes, which types)- YES, Psoriatic arthritis, Cardiac ablation     Social History:     What is/was your primary occupation? Retired     What are your hobbies/past-times? None    Family History:  Have any of your "first degree relatives" (parent, brother, sister, or child) had any of the following       · Skin cancer such as Melanoma or Merkel Cell Carcinoma or Pancreatic Cancer? YES, Sister: possible skin cancer  · Eczema, Asthma, Hay Fever or Seasonal Allergies: YES, Seasonal allergies  · Psoriasis or Psoriatic Arthritis: No  · Do any other medical conditions seem to run in your family? If Yes, what condition and which relatives?   YES, Sister: Rheumatoid Arthritis; Brother; Cardiac, Son: Cardiac Current Medications:   (please update all dermatological medications before printing patient's AVS!)      Current Outpatient Medications:     albuterol (ACCUNEB) 1 25 MG/3ML nebulizer solution, Take 1 ampule by nebulization every 6 (six) hours as needed for wheezing, Disp: , Rfl:     aspirin (Aspirin 81) 81 mg chewable tablet, CHEW ONE TABLET BY MOUTH EVERY DAY, Disp: , Rfl:     atorvastatin (LIPITOR) 80 mg tablet, Take 40 mg by mouth, Disp: , Rfl:     Carboxymethylcellulose Sod PF 0 25 % SOLN, INSTILL ONE DROP BOTH EYES FOUR TIMES A DAY FOR DRY EYES, Disp: , Rfl:     Cholecalciferol 2000 units CAPS, Take 2,000 Units by mouth, Disp: , Rfl:     clobetasol (TEMOVATE) 0 05 % cream, Apply topically 2 (two) times a day, Disp: 30 g, Rfl: 0    dextran 70-hypromellose (GENTEAL TEARS) 0 1-0 3 % ophthalmic solution, Apply 1 drop to eye, Disp: , Rfl:     diclofenac sodium (VOLTAREN) 1 %, Apply 2 g topically 4 (four) times a day as needed (pain) (Patient not taking: Reported on 3/15/2021), Disp: 100 g, Rfl: 3    ketotifen (ZADITOR) 0 025 % ophthalmic solution, INSTILL ONE DROP BOTH EYES TWICE A DAY, Disp: , Rfl:     nitroglycerin (NITROSTAT) 0 4 mg SL tablet, Place 0 4 mg under the tongue, Disp: , Rfl:     omeprazole (PriLOSEC OTC) 20 MG tablet, Take 20 mg by mouth, Disp: , Rfl:     polyvinyl alcohol (LIQUIFILM TEARS) 1 4 % ophthalmic solution, Apply 1 drop to eye, Disp: , Rfl:     sertraline (ZOLOFT) 100 mg tablet, Take 100 mg by mouth, Disp: , Rfl:     sulfaSALAzine (AZULFIDINE) 500 mg tablet, Take 1 tab daily for a week, then 1 tab twice a day for a week, then 2 tabs twice a day, Disp: 120 tablet, Rfl: 3    tamsulosin (FLOMAX) 0 4 mg, Take 0 4 mg by mouth, Disp: , Rfl:       Review of Systems:  Have you recently had or currently have any of the following? If YES, what are you doing for the problem?     · Fever, chills or unintended weight loss: No  · Sudden loss or change in your vision: No  · Nausea, vomiting or blood in your stool: No  · Painful or swollen joints: YES, Psoriatic arthritis  · Wheezing or cough: No  · Changing mole or non-healing wound: YES, Left upper arm  · Nosebleeds: No  · Excessive sweating: No  · Easy or prolonged bleeding? No  · Over the last 2 weeks, how often have you been bothered by the following problems? · Taking little interest or pleasure in doing things: 1 - Not at All  · Feeling down, depressed, or hopeless: 1 - Not at All  · Rapid heartbeat with epinephrine:  No    · FEMALES ONLY:    · Are you pregnant or planning to become pregnant? N/A  · Are you currently or planning to be nursing or breast feeding? N/A    · Any known allergies? Allergies   Allergen Reactions    Cefadroxil Other (See Comments)     bleeding internally    Midazolam Other (See Comments)     ARRHYTHMIA    Prazosin     Fentanyl Palpitations   ·       Physical Exam:     Was a chaperone (Derm Clinical Assistant) present throughout the entire Physical Exam? Yes     Did the Dermatology Team specifically  the patient on the importance of a Full Skin Exam to be sure that nothing is missed clinically?  Yes}  o Did the patient ultimately request or accept a Full Skin Exam?  Yes  o Did the patient specifically refuse to have the areas "under-the-bra" examined by the Dermatologist? No  o Did the patient specifically refuse to have the areas "under-the-underwear" examined by the Dermatologist? No    CONSTITUTIONAL:   Vitals:    05/05/21 1051   Temp: 98 °F (36 7 °C)   Weight: 70 8 kg (156 lb)   Height: 5' 10" (1 778 m)         PSYCH: Normal mood and affect  EYES: Normal conjunctiva  ENT: Normal lips and oral mucosa  CARDIOVASCULAR: No edema  RESPIRATORY: Normal respirations  HEME/LYMPH/IMMUNO:  No regional lymphadenopathy except as noted below in "ASSESSMENT AND PLAN BY DIAGNOSIS"    SKIN:  FULL ORGAN SYSTEM EXAM  Hair, Scalp, Ears, Face Normal except as noted below in Assessment   Neck, Cervical Chain Nodes Normal except as noted below in Assessment   Right Arm/Hand/Fingers Normal except as noted below in Assessment   Left Arm/Hand/Fingers Normal except as noted below in Assessment   Chest/Breasts/Axillae Viewed areas Normal except as noted below in Assessment   Abdomen, Umbilicus Normal except as noted below in Assessment   Back/Spine Normal except as noted below in Assessment   Groin/Genitalia/Buttocks NOT EXAMINED   Right Leg, Foot, Toes Normal except as noted below in Assessment   Left Leg, Foot, Toes Normal except as noted below in Assessment        Assessment and Plan by Diagnosis:    History of Present Condition:     Duration:  How long has this been an issue for you?    o  Over one year   Location Affected:  Where on the body is this affecting you?    o  Fingers, left knee   Quality:  Is there any bleeding, pain, itch, burning/irritation, or redness associated with the skin lesion? o  pain, itch (psoriasis)   Severity:  Describe any bleeding, pain, itch, burning/irritation, or redness on a scale of 1 to 10 (with 10 being the worst)  o  5/10   Timing:  Does this condition seem to be there pretty constantly or do you notice it more at specific times throughout the day?    o  Constant   Context:  Have you ever noticed that this condition seems to be associated with specific activities you do?    o  Psoriasis    Modifying Factors:    o Anything that seems to make the condition worse?    -  working with hands  o What have you tried to do to make the condition better? -  Methotrexate, Sulfasalazine, clobetasol 0 05% cream psoriasis flares     Associated Signs and Symptoms:  Does this skin lesion seem to be associated with any of the following:  o  SL AMB DERM SIGNS AND SYMPTOMS: Joint Pain     MELANOCYTIC NEVI ("Moles")    Physical Exam:   Anatomic Location Affected:   Mostly on sun-exposed areas of the trunk and extremities   Morphological Description:  Scattered, 1-4mm round to ovoid, symmetrical-appearing, even bordered, skin colored to dark brown macules/papules, mostly in sun-exposed areas   Pertinent Positives:   Pertinent Negatives: Additional History of Present Condition:      Assessment and Plan:  Based on a thorough discussion of this condition and the management approach to it (including a comprehensive discussion of the known risks, side effects and potential benefits of treatment), the patient (family) agrees to implement the following specific plan:   When outside we recommend using a wide brim hat, sunglasses, long sleeve and pants, sunscreen with SPF 94+ with reapplication every 2 hours, or SPF specific clothing    Benign, reassured   Annual skin check     Melanocytic Nevi  Melanocytic nevi ("moles") are tan or brown, raised or flat areas of the skin which have an increased number of melanocytes  Melanocytes are the cells in our body which make pigment and account for skin color  Some moles are present at birth (I e , "congenital nevi"), while others come up later in life (i e , "acquired nevi")  The sun can stimulate the body to make more moles  Sunburns are not the only thing that triggers more moles  Chronic sun exposure can do it too  Clinically distinguishing a healthy mole from melanoma may be difficult, even for experienced dermatologists  The "ABCDE's" of moles have been suggested as a means of helping to alert a person to a suspicious mole and the possible increased risk of melanoma  The suggestions for raising alert are as follows:    Asymmetry: Healthy moles tend to be symmetric, while melanomas are often asymmetric  Asymmetry means if you draw a line through the mole, the two halves do not match in color, size, shape, or surface texture  Asymmetry can be a result of rapid enlargement of a mole, the development of a raised area on a previously flat lesion, scaling, ulceration, bleeding or scabbing within the mole    Any mole that starts to demonstrate "asymmetry" should be examined promptly by a board certified dermatologist      Mary Jo Benson: Healthy moles tend to have discrete, even borders  The border of a melanoma often blends into the normal skin and does not sharply delineate the mole from normal skin  Any mole that starts to demonstrate "uneven borders" should be examined promptly by a board certified dermatologist      Color: Healthy moles tend to be one color throughout  Melanomas tend to be made up of different colors ranging from dark black, blue, white, or red  Any mole that demonstrates a color change should be examined promptly by a board certified dermatologist      Diameter: Healthy moles tend to be smaller than 0 6 cm in size; an exception are "congenital nevi" that can be larger  Melanomas tend to grow and can often be greater than 0 6 cm (1/4 of an inch, or the size of a pencil eraser)  This is only a guideline, and many normal moles may be larger than 0 6 cm without being unhealthy  Any mole that starts to change in size (small to bigger or bigger to smaller) should be examined promptly by a board certified dermatologist      Evolving: Healthy moles tend to "stay the same "  Melanomas may often show signs of change or evolution such as a change in size, shape, color, or elevation  Any mole that starts to itch, bleed, crust, burn, hurt, or ulcerate or demonstrate a change or evolution should be examined promptly by a board certified dermatologist       Dysplastic Nevi  Dysplastic moles are moles that fit the ABCDE rules of melanoma but are not identified as melanomas when examined under the microscope  They may indicate an increased risk of melanoma in that person  If there is a family history of melanoma, most experts agree that the person may be at an increased risk for developing a melanoma  Experts still do not agree on what dysplastic moles mean in patients without a personal or family history of melanoma    Dysplastic moles are usually larger than common moles and have different colors within it with irregular borders  The appearance can be very similar to a melanoma  Biopsies of dysplastic moles may show abnormalities which are different from a regular mole  Melanoma  Malignant melanoma is a type of skin cancer that can be deadly if it spreads throughout the body  The incidence of melanoma in the [place] is growing faster than any other cancer  Melanoma usually grows near the surface of the skin for a period of time, and then begins to grow deeper into the skin  Once it grows deeper into the skin, the risk of spread to other organs greatly increases  Therefore, early detection and removal of a malignant melanoma may result in a better chance at a complete cure; removal after the tumor has spread may not be as effective, leading to worse clinical outcomes such as death  The true rate of nevus transformation into a melanoma is unknown  It has been estimated that the lifetime risk for any acquired melanocytic nevus on any 21year-old individual transforming into melanoma by age [de-identified] is 0 03% (1 in 3,164) for men and 0 009% (1 in 10,800) for women  The appearance of a "new mole" remains one of the most reliable methods for identifying a malignant melanoma  Occasionally, melanomas appear as rapidly growing, blue-black, dome-shaped bumps within a previous mole or previous area of normal skin  Other times, melanomas are suspected when a mole suddenly appears or changes  Itching, burning, or pain in a pigmented lesion should increase suspicion, but most patients with early melanoma have no skin discomfort whatsoever  Melanoma can occur anywhere on the skin, including areas that are difficult for self-examination  Many melanomas are first noticed by other family members  Suspicious-looking moles may be removed for microscopic examination  You may be able to prevent death from melanoma by doing two simple things:    1   Try to avoid unnecessary sun exposure and protect your skin when it is exposed to the sun  People who live near the equator, people who have intermittent exposures to large amounts of sun, and people who have had sunburns in childhood or adolescence have an increased risk for melanoma  Sun sense and vigilant sun protection may be keys to helping to prevent melanoma  We recommend wearing UPF-rated sun protective clothing and sunglasses whenever possible and applying a moisturizer-sunscreen combination product (SPF 50+) such as Neutrogena Daily Defense to sun exposed areas of skin at least three times a day  2  Have your moles regularly examined by a board certified dermatologist AND by yourself or a family member/friend at home  We recommend that you have your moles examined at least once a year by a board certified dermatologist   Use your birthday as an annual reminder to have your "Birthday Suit" (I e , your skin) examined; it is a nice birthday gift to yourself to know that your skin is healthy appearing! Additionally, at-home self examinations may be helpful for detecting a possible melanoma  Use the ABCDEs we discussed and check your moles once a month at home  LENTIGO    Physical Exam:   Anatomic Location Affected:  Bilateral arms   Morphological Description:  Light brown macules   Pertinent Positives:   Pertinent Negatives: Additional History of Present Condition:      Assessment and Plan:  Based on a thorough discussion of this condition and the management approach to it (including a comprehensive discussion of the known risks, side effects and potential benefits of treatment), the patient (family) agrees to implement the following specific plan:   When outside we recommend using a wide brim hat, sunglasses, long sleeve and pants, sunscreen with SPF 33+ with reapplication every 2 hours, or SPF specific clothing       What is a lentigo?   A lentigo is a pigmented flat or slightly raised lesion with a clearly defined edge  Unlike an ephelis (freckle), it does not fade in the winter months  There are several kinds of lentigo  The name lentigo originally referred to its appearance resembling a small lentil  The plural of lentigo is lentigines, although lentigos is also in common use  Who gets lentigines? Lentigines can affect males and females of all ages and races  Solar lentigines are especially prevalent in fair skinned adults  Lentigines associated with syndromes are present at birth or arise during childhood  What causes lentigines? Common forms of lentigo are due to exposure to ultraviolet radiation:   Sun damage including sunburn    Indoor tanning    Phototherapy, especially photochemotherapy (PUVA)    Ionizing radiation, eg radiation therapy, can also cause lentigines  Several familial syndromes associated with widespread lentigines originate from mutations in Artis-MAP kinase, mTOR signaling and PTEN pathways  What are the clinical features of lentigines? Lentigines have been classified into several different types depending on what they look like, where they appear on the body, causative factors, and whether they are associated to other diseases or conditions  Lentigines may be solitary or more often, multiple  Most lentigines are smaller than 5 mm in diameter      Lentigo simplex   A precursor to junctional naevus    Arises during childhood and early adult life    Found on trunk and limbs    Small brown round or oval macule or thin plaque    Jagged or smooth edge    May have a dry surface    May disappear in time  Solar lentigo   A precursor to seborrhoeic keratosis    Found on chronically sun exposed sites such as hands, face, lower legs    May also follow sunburn to shoulders    Yellow, light or dark brown regular or irregular macule or thin plaque    May have a dry surface    Often has moth-eaten outline    Can slowly enlarge to several centimeters in diameter  May disappear, often through the process known as lichenoid keratosis    When atypical in appearance, may be difficult to distinguish from melanoma in situ  Ink spot lentigo   Also known as reticulated lentigo    Few in number compared to solar lentigines    Follows sunburn in very fair skinned individuals    Dark brown to black irregular ink spot-like macule  PUVA lentigo   Similar to ink spot lentigo but follows photochemotherapy (PUVA)    Location anywhere exposed to PUVA  Tanning bed lentigo   Similar to ink spot lentigo but follows indoor tanning    Location anywhere exposed to tanning bed  Radiation lentigo   Occurs in site of irradiation (accidental or therapeutic)    Associated with late-stage radiation dermatitis: epidermal atrophy, subcutaneous fibrosis, keratosis, telangiectasias  Melanotic macule   Mucosal surfaces or adjacent glabrous skin eg lip, vulva, penis, anus    Light to dark brown    Also called mucosal melanosis  Generalised lentigines   Found on any exposed or covered site from early childhood    Small macules may merge to form larger patches    Not associated with a syndrome    Also called lentigines profusa, multiple lentigines  Agminated lentigines   Naevoid eruption of lentigos confined to a single segmental area    Sharp demarcation in midline    May have associated neurological and developmental abnormalities  Patterned lentigines   Inherited tendency to lentigines on face, lips, buttocks, palms, soles    Recognised mainly in people of  ethnicity  Centrofacial neurodysraphic lentiginosis  Associated with mental retardation  Lentiginosis syndromes   Syndromes include LEOPARD/Cecil, Peutz-Jeghers, Laugier-Hunziker, Moynahan, Xeroderma pigmentosum, myxoma syndromes (COREY, NAME, Ramirez), Ruvalcaba-Myhre-Abreu, Bannayan-Zonnana syndrome, Cowden disease (multiple hamartoma syndrome )    Inheritance is autosomal dominant; sporadic cases common    Widespread lentigines present at birth or arise in early childhood    Associated with neural, endocrine, and mesenchymal tumors    How is the diagnosis made? Lentigines are usually diagnosed clinically by their typical appearance  Concern regarding possibility of melanoma may lead to:   Dermatoscopy    Diagnostic excision biopsy    Histopathology of a lentigo shows:   Thickened epidermis    An increased number of melanocytes along the basal layer of epidermis    Unlike junctional melanocytic naevus, there are no nests of melanocytes    Increased melanin pigment within the keratinocytes    Additional features depending on type of lentigo    In contrast, an ephelis (freckle) shows sun-induced increased melanin within the keratinocytes, without an increase in number of cells  What is the treatment for lentigines? Most lentigines are left alone  Attempts to lighten them may not be successful  The following approaches are used:   SPF 50+ broad-spectrum sunscreen    Hydroquinone bleaching cream    Alpha hydroxy acids    Vitamin C    Retinoids    Azelaic acid    Chemical peels  Individual lesions can be permanently removed using:   Cryotherapy    Intense pulsed light    Pigment lasers    How can lentigines be prevented? Lentigines associated with exposure ultraviolet radiation can be prevented by very careful sun protection  Clothing is more successful at preventing new lentigines than are sunscreens  What is the outlook for lentigines? Lentigines usually persist  They may increase in number with age and sun exposure  Some in sun-protected sites may fade and disappear  RICHARDS ANGIOMAS    Physical Exam:   Anatomic Location Affected:  trunk   Morphological Description:  Scattered cherry red, 1-4 mm papules   Pertinent Positives:   Pertinent Negatives:     Additional History of Present Condition:      Assessment and Plan:  Based on a thorough discussion of this condition and the management approach to it (including a comprehensive discussion of the known risks, side effects and potential benefits of treatment), the patient (family) agrees to implement the following specific plan:   Monitor for changes   Benign, reassured       Assessment and Plan:    Cherry angioma, also known as [employer] spots, are benign vascular skin lesions  A "cherry angioma" is a firm red, blue or purple papule, 0 1-1 cm in diameter  When thrombosed, they can appear black in colour until evaluated with a dermatoscope when the red or purple colour is more easily seen  Cherry angioma may develop on any part of the body but most often appear on the scalp, face, lips and trunk  An angioma is due to proliferating endothelial cells; these are the cells that line the inside of a blood vessel  Angiomas can arise in early life or later in life; the most common type of angioma is a cherry angioma  Cherry angiomas are very common in males and females of any age or race  They are more noticeable in white skin than in skin of colour  They markedly increase in number from about the age of 36  There may be a family history of similar lesions  Eruptive cherry angiomas have been rarely reported to be associated with internal malignancy  The cause of angiomas is unknown  Genetic analysis of cherry angiomas has shown that they frequently carry specific somatic missense mutations in the GNAQ and GNA11 (Q209H) genes, which are involved in other vascular and melanocytic proliferations  Cherry angioma is usually diagnosed clinically and no investigations are necessary for the majority of lesions  It has a characteristic red-clod or lobular pattern on dermatoscopy (called lacunar pattern using conventional pattern analysis)  When there is uncertainty about the diagnosis, a biopsy may be performed  The angioma is composed of venules in a thickened papillary dermis  Collagen bundles may be prominent between the lobules      Cherry angiomas are harmless, so they do not usually have to be treated  Occasionally, they are removed to exclude a malignant skin lesion such as a nodular melanoma or because they are irritated or bleeding (and a subsequent risk for infection)  To decrease friction over the lesions, we recommend Neutrogena Daily Defense SPF 50+ at least 3 times a day  SEBORRHEIC KERATOSIS; NON-INFLAMED    Physical Exam:   Anatomic Location Affected:  trunk   Morphological Description:  Flat and raised, waxy, smooth to warty textured, yellow to brownish-grey to dark brown to blackish, discrete, "stuck-on" appearing papules   Pertinent Positives:   Pertinent Negatives: Additional History of Present Condition:      Assessment and Plan:  Based on a thorough discussion of this condition and the management approach to it (including a comprehensive discussion of the known risks, side effects and potential benefits of treatment), the patient (family) agrees to implement the following specific plan:   Monitor for changes   Benign, reassured       Seborrheic Keratosis  A seborrheic keratosis is a harmless warty spot that appears during adult life as a common sign of skin aging  Seborrheic keratoses can arise on any area of skin, covered or uncovered, with the usual exception of the palms and soles  They do not arise from mucous membranes  Seborrheic keratoses can have highly variable appearance  Seborrheic keratoses are extremely common  It has been estimated that over 90% of adults over the age of 61 years have one or more of them  They occur in males and females of all races, typically beginning to erupt in the 35s or 45s  They are uncommon under the age of 21 years  The precise cause of seborrhoeic keratoses is not known  Seborrhoeic keratoses are considered degenerative in nature  As time goes by, seborrheic keratoses tend to become more numerous   Some people inherit a tendency to develop a very large number of them; some people may have hundreds of them  The name "seborrheic keratosis" is misleading, because these lesions are not limited to a seborrhoeic distribution (scalp, mid-face, chest, upper back), nor are they formed from sebaceous glands, nor are they associated with sebum -- which is greasy  Seborrheic keratosis may also be called "SK," "Seb K," "basal cell papilloma," "senile wart," or "barnacle "      Researchers have noted:   Eruptive seborrhoeic keratoses can follow sunburn or dermatitis   Skin friction may be the reason they appear in body folds   Viral cause (e g , human papillomavirus) seems unlikely   Stable and clonal mutations or activation of FRFR3, PIK3CA, MARGUERITE, AKT1 and EGFR genes are found in seborrhoeic keratoses   Seborrhoeic keratosis can arise from solar lentigo   FRFR3 mutations also arise in solar lentigines  These mutations are associated with increased age and location on the head and neck, suggesting a role of ultraviolet radiation in these lesions   Seborrheic keratoses do not harbour tumour suppressor gene mutations   Epidermal growth factor receptor inhibitors, which are used to treat some cancers, often result in an increase in verrucal (warty) keratoses  There is no easy way to remove multiple lesions on a single occasion  Unless a specific lesion is "inflamed" and is causing pain or stinging/burning or is bleeding, most insurance companies do not authorize treatment  XEROSIS ("DRY SKIN")    Physical Exam:   Anatomic Location Affected:  diffuse   Morphological Description:  xerosis   Pertinent Positives:   Pertinent Negatives:     Additional History of Present Condition:      Assessment and Plan:  Based on a thorough discussion of this condition and the management approach to it (including a comprehensive discussion of the known risks, side effects and potential benefits of treatment), the patient (family) agrees to implement the following specific plan:   Use moisturizer like Eucerin,Cerave or Aveeno Cream 3 times a day for the dry skin               Dry skin refers to skin that feels dry to touch  Dry skin has a dull surface with a rough, scaly quality  The skin is less pliable and cracked  When dryness is severe, the skin may become inflamed and fissured  Although any body site can be dry, dry skin tends to affect the shins more than any other site  Dry skin is lacking moisture in the outer horny cell layer (stratum corneum) and this results in cracks in the skin surface  Dry skin is also called xerosis, xeroderma or asteatosis (lack of fat)  It can affect males and females of all ages  There is some racial variability in water and lipid content of the skin   Dry skin that starts in early childhood may be one of about 20 types of ichthyosis (fish-scale skin)  There is often a family history of dry skin   Dry skin is commonly seen in people with atopic dermatitis   Nearly everyone > 60 years has dry skin  Dry skin that begins later may be seen in people with certain diseases and conditions   Postmenopausal women   Hypothyroidism   Chronic renal disease    Malnutrition and weight loss    Subclinical dermatitis    Treatment with certain drugs such as oral retinoids, diuretics and epidermal growth factor receptor inhibitors    People exposed to a dry environment may experience dry skin   Low humidity: in desert climates or cool, windy conditions    Excessive air conditioning    Direct heat from a fire or fan heater    Excessive bathing    Contact with soap, detergents and solvents    Inappropriate topical agents such as alcohol    Frictional irritation from rough clothing or abrasives    Dry skin is due to abnormalities in the integrity of the barrier function of the stratum corneum, which is made up of corneocytes   There is an overall reduction in the lipids in the stratum corneum      Ratio of ceramides, cholesterol and free fatty acids may be normal or altered   There may be a reduction in the proliferation of keratinocytes   Keratinocyte subtypes change in dry skin with a decrease in keratins K1, K10 and increase in K5, K14     Involucrin (a protein) may be expressed early, increasing cell stiffness   The result is retention of corneocytes and reduced water-holding capacity  The inherited forms of ichthyosis are due to loss of function mutations in various genes (listed in parentheses below)  The clinical features of ichthyosis depend on the specific type of ichthyosis:   Ichthyosis vulgaris (FLG)   Recessive X-linked ichthyosis (STS)    Autosomal recessive congenital ichthyosis (ABCA12, TGM1, ALOXE3)    Keratinopathic ichthyoses (KRT1, KRT10, KRT2)    Acquired ichthyosis may be due to:   Metabolic factors: thyroid deficiency    Illness: lymphoma, internal malignancy, sarcoidosis, HIV infection    Drugs: nicotinic acid, kava, protein kinase inhibitors (eg EGFR inhibitors), hydroxyurea  Complications of dry skin:  Dry areas of skin may become itchy, indicating a form of eczema/dermatitis has developed   Atopic eczema -- especially in people with ichthyosis vulgaris    Eczema craquelé -- especially in elderly people  Also called asteatotic eczema    A dry form of nummular dermatitis/discoid eczema -- especially in people that wash their skin excessively  When the dry skin of an elderly person is itchy without a visible rash, it is sometimes called winter itch, 7th age itch, senile pruritus or chronic pruritus of the elderly  Other complications of dry skin may include:   Skin infection when bacteria or viruses penetrate a break in the skin surface    Overheating, especially in some forms of ichthyosis    Food allergy, eg, to peanuts, has been associated with filaggrin mutations    Contact allergy, eg, to nickel, has also been correlated with barrier function defects  How is the type of dry skin diagnosed?   The type of dry skin is diagnosed by careful history and examination  In children:   Family history    Age of onset    Appearance at birth, if known    Distribution of dry skin    Other features, eg eczema, abnormal nails, hair, dentition, sight, hearing  In adults:   Medical history    Medications and topical preparations    Bathing frequency and use of soap    Evaluation of environmental factors that may contribute to dry skin  What is the treatment for dry skin? The mainstay of treatment of dry skin and ichthyosis is moisturisers/emollients  They should be applied liberally and often enough to:   Reduce itch    Improve the barrier function    Prevent entry of irritants, bacteria    Reduce transepidermal water loss  When considering which emollient is most suitable, consider:   Severity of the dryness    Tolerance    Personal preference    Cost and availability  Emollients generally work best if applied to damp skin, if pH is below 7 (acidic), and if containing humectants such as urea or propylene glycol  Additional treatments include:   Topical steroid if itchy or there is dermatitis -- choose an emollient base    Topical calcineurin inhibitors if topical steroids are unsuitable  How can dry skin be prevented? Eliminate aggravating factors:   Reduce the frequency of bathing   A humidifier in winter and air conditioner in summer    Compare having a short shower with a prolonged soak in a bath   Use lukewarm, not hot, water   Replace standard soap with a substitute such as a synthetic detergent cleanser, water-miscible emollient, bath oil, anti-pruritic tar oil, colloidal oatmeal etc     Apply an emollient liberally and often, particularly shortly after bathing, and when itchy  The drier the skin, the thicker this should be, especially on the hands  What is the outlook for dry skin?   A tendency to dry skin may persist life-long, or it may improve once contributing factors are controlled  PSORIASIS    Physical Exam:   Anatomic Location Affected:  Scalp   Morphological Description:  Currently clear   Severity: N/A   Body Percent Affected: 0%   Pertinent Positives:   Pertinent Negatives: Additional History of Present Condition:  Patient was recently prescribed clobetasol 0 05% cream and has not really used it as he has no active areas currently    Assessment and Plan:  Based on a thorough discussion of this condition and the management approach to it (including a comprehensive discussion of the known risks, side effects and potential benefits of treatment), the patient (family) agrees to implement the following specific plan:   Recommend not applying Clobetasol Cream daily as it is a very strong steroid and will thin your skin   If you start a flare of Psoriasis please send a picture via My Chart to Dr Nicky Jimenez and she will send an appropriate medication to your pharmacy   Follow up with Rheumatology for Psoriatic Arthritis        Psoriasis is a chronic inflammatory condition that causes the body to make new skin cells in days rather than weeks  As these cells pile up on the surface of the skin, you may see thick, scaly patches of thickened skin  Psoriasis affects 2-4% of males and females  It can start at any age including childhood, with peaks of onset at 15-25 years and 50-60 years  It tends to persist lifelong, fluctuating in extent and severity  It is particularly common in Caucasians but may affect people of any race  About one-third of patients with psoriasis have family members with psoriasis  Psoriasis is multifactorial  It is classified as an immune-mediated inflammatory disease (IMID)  Genetic factors are important and influence the type of psoriasis and response to treatment  What are the signs and symptoms of psoriasis? There are many different types of psoriasis that each have present uniquely   The types of psoriasis include:    Plaque psoriasis: About 80% to 90% of people who have psoriasis develop this type  When plaque psoriasis appears, you may see:  Plaque psoriasis usually presents with symmetrically distributed, red, scaly plaques with well-defined edges  The scale is typically silvery white, except in skin folds where the plaques often appear shiny and they may have a moist peeling surface  The most common sites are scalp, elbows and knees, but any part of the skin can be involved  The plaques are usually very persistent without treatment  Itch is mostly mild but may be severe in some patients, leading to scratching and lichenification (thickened leathery skin with increased skin markings)  Painful skin cracks or fissures may occur  When psoriatic plaques clear up, they may leave brown or pale marks that can be expected to fade over several months  Guttate psoriasis: When someone gets this type of psoriasis, you often see tiny bumps appear on the skin quite suddenly  The bumps tend to cover much of the torso, legs, and arms  Sometimes, the bumps also develop on the face, scalp, and ears  No matter where they appear, the bumps tend to be:    Small and scaly   [place]-colored to pink   Temporary, clearing in a few weeks or months without treatment  When guttate psoriasis clears, it may never return  Why this happens is still a bit of a mystery  Guttate psoriasis tends to develop in children and young adults who've had an infection, such as strep throat  It's possible that when the infection clears so does guttate psoriasis  It's also possible to have:   Guttate psoriasis for life   See the guttate psoriasis clear and plaque psoriasis develop later in life   Plaque psoriasis when you develop guttate psoriasis  There's no way to predict what will happen after the first flare-up of guttate psoriasis clears  Inverse psoriasis:  This type of psoriasis develops in areas where skin touches skin, such as the armpits, genitals, and crease of the buttocks  Where the inverse psoriasis appears, you're likely to notice:   Smooth, red patches of skin that look raw   Little, if any, silvery-white coating   Sore or painful skin  Other names for this type of psoriasis are intertriginous psoriasis or flexural psoriasis  Pustular psoriasis: This type of psoriasis causes pus-filled bumps that usually appear only on the feet and hands  While the pus-filled bumps may look like an infection, the skin is not infected  The bumps don't contain bacteria or anything else that could cause an infection  Where pustular psoriasis appears, you tend to notice:   Red, swollen skin that is dotted with pus-filled bumps   Extremely sore or painful skin   Brown dots (and sometimes scale) appear as the pus-filled bumps dry  Pustular psoriasis can make just about any activity that requires your hands or feet, such as typing or walking, unbearably painful  Pustular psoriasis (generalized): Serious and life-threatening, this rare type of psoriasis causes pus-filled bumps to develop on much of the skin  Also called von Zumbusch psoriasis, a flare-up causes this sequence of events:  1  Skin on most of the body suddenly turns dry, red, and tender  2  Within hours, pus-filled bumps cover most of the skin  3  Often within a day, the pus-filled bumps break open and pools of pus leak onto the skin  4  As the pus dries (usually within 24 to 48 hours), the skin dries out and peels (as shown in this picture)  5  When the dried skin peels off, you see a smooth, glazed surface  6  In a few days or weeks, you may see a new crop of pus-filled bumps covering most of the skin, as the cycle repeats itself  Anyone with pustular psoriasis also feels very sick, and may develop a fever, headache, muscle weakness, and other symptoms  Medical care is often necessary to save the person's life      Erythrodermic psoriasis: Serious and life-threatening, this type of psoriasis requires immediate medical care  When someone develops erythrodermic psoriasis, you may notice:   Skin on most of the body looks burnt   Chills, fever, and the person looks extremely ill   Muscle weakness, a rapid pulse, and severe itch  The person may also be unable to keep warm, so hypothermia can set in quickly  Most people who develop this type of psoriasis already have another type of psoriasis  Before developing erythrodermic psoriasis, they often notice that their psoriasis is worsening or not improving with treatment  If you notice either of these happening, see a board-certified dermatologist     Nails    Nail psoriasis: With any type of psoriasis, you may see changes to your fingernails or toenails  About half of the people who have plaque psoriasis see signs of psoriasis on their fingernails at some point2  When psoriasis affects the nails, you may notice:   Tiny dents in your nails (called nail pits)   White, yellow, or brown discoloration under one or more nails   Crumbling, rough nails   A nail lifting up so that it's no longer attached   Buildup of skin cells beneath one or more nails, which lifts up the nail  Treatment and proper nail care can help you control nail psoriasis  Psoriatic arthritis: If you have psoriasis, it's important to pay attention to your joints  Some people who have psoriasis develop a type of arthritis called psoriatic arthritis  This is more likely to occur if you have severe psoriasis  Most people notice psoriasis on their skin years before they develop psoriatic arthritis  It's also possible to get psoriatic arthritis before psoriasis, but this is less common  When psoriatic arthritis develops, the signs can be subtle   At first, you may notice:   A swollen and tender joint, especially in a finger or toe   Heel pain   Swelling on the back of your leg, just above your heel   Stiffness in the morning that fades during the day  Like psoriasis, psoriatic arthritis cannot be cured  Treatment can prevent psoriatic arthritis from worsening, which is important  Allowed to progress, psoriatic arthritis can become disabling  Diagnosis and treatment of psoriasis   Psoriasis is usually diagnosed by clinical features, and skin biopsy if necessary  It is important to decrease factors that aggravate psoriasis  These include treating streptococcal infections, minimizing skin injuries, avoiding sun exposure if it exacerbates psoriasis, smoking, alcohol usage, decreasing stress, and maintaining an optimal body weight  Certain medications such as lithium, beta blockers, antimalarials, and NSAIDs have also been implicated  Suddenly stopping oral steroids or strong topical steroids can cause rebound disease  There are many categories of psoriasis treatments available  Topical therapy  Mild psoriasis is generally treated with topical agents alone  Which treatment is selected may depend on body site, extent and severity of psoriasis   Emollients   Coal tar preparations   Dithranol   Salicylic acid   Vitamin D analogue (calcipotriol)   Topical corticosteroids   Calcineurin inhibitor (tacrolimus, pimecrolimus)  Phototherapy  Most psoriasis centres offer phototherapy with ultraviolet (UV) radiation, often in combination with topical or systemic agents  Types of phototherapy include   Narrowband UVB   Broadband UVB   Photochemotherapy (PUVA)   Targeted phototherapy  Systemic therapy  Moderate to severe psoriasis warrants treatment with a systemic agent and/or phototherapy  The most common treatments are:   Methotrexate   Ciclosporin   Acitretin  Other medicines occasionally used for psoriasis include:   Mycophenolate   Apremilast   Hydroxyurea   Azathioprine   6-mercaptopurine  Systemic corticosteroids are best avoided due to a risk of severe withdrawal flare of psoriasis and adverse effects    Biologics or targeted therapies are reserved for conventional treatment-resistant severe psoriasis, mainly because of expense, as side effects compare favorably with other systemic agents  These include:   Anti-tumour necrosis factor-alpha antagonists (anti-TNF?) infliximab, adalimumab and etanercept   The interleukin (IL)-12/23 antagonist ustekinumab   IL-17 antagonists such as secukinumab  Many other monoclonal antibodies are under investigation in the treatment of psoriasis                            Scribe Attestation    I,:  Claudia Alvarado am acting as a scribe while in the presence of the attending physician :       I,:  Jadon Collazo MD personally performed the services described in this documentation    as scribed in my presence :

## 2021-05-05 NOTE — PATIENT INSTRUCTIONS
MELANOCYTIC NEVI ("Moles")    Assessment and Plan:  Based on a thorough discussion of this condition and the management approach to it (including a comprehensive discussion of the known risks, side effects and potential benefits of treatment), the patient (family) agrees to implement the following specific plan:   When outside we recommend using a wide brim hat, sunglasses, long sleeve and pants, sunscreen with SPF 00+ with reapplication every 2 hours, or SPF specific clothing    Benign, reassured   Annual skin check     Melanocytic Nevi  Melanocytic nevi ("moles") are tan or brown, raised or flat areas of the skin which have an increased number of melanocytes  Melanocytes are the cells in our body which make pigment and account for skin color  Some moles are present at birth (I e , "congenital nevi"), while others come up later in life (i e , "acquired nevi")  The sun can stimulate the body to make more moles  Sunburns are not the only thing that triggers more moles  Chronic sun exposure can do it too  Clinically distinguishing a healthy mole from melanoma may be difficult, even for experienced dermatologists  The "ABCDE's" of moles have been suggested as a means of helping to alert a person to a suspicious mole and the possible increased risk of melanoma  The suggestions for raising alert are as follows:    Asymmetry: Healthy moles tend to be symmetric, while melanomas are often asymmetric  Asymmetry means if you draw a line through the mole, the two halves do not match in color, size, shape, or surface texture  Asymmetry can be a result of rapid enlargement of a mole, the development of a raised area on a previously flat lesion, scaling, ulceration, bleeding or scabbing within the mole  Any mole that starts to demonstrate "asymmetry" should be examined promptly by a board certified dermatologist      Border: Healthy moles tend to have discrete, even borders    The border of a melanoma often blends into the normal skin and does not sharply delineate the mole from normal skin  Any mole that starts to demonstrate "uneven borders" should be examined promptly by a board certified dermatologist      Color: Healthy moles tend to be one color throughout  Melanomas tend to be made up of different colors ranging from dark black, blue, white, or red  Any mole that demonstrates a color change should be examined promptly by a board certified dermatologist      Diameter: Healthy moles tend to be smaller than 0 6 cm in size; an exception are "congenital nevi" that can be larger  Melanomas tend to grow and can often be greater than 0 6 cm (1/4 of an inch, or the size of a pencil eraser)  This is only a guideline, and many normal moles may be larger than 0 6 cm without being unhealthy  Any mole that starts to change in size (small to bigger or bigger to smaller) should be examined promptly by a board certified dermatologist      Evolving: Healthy moles tend to "stay the same "  Melanomas may often show signs of change or evolution such as a change in size, shape, color, or elevation  Any mole that starts to itch, bleed, crust, burn, hurt, or ulcerate or demonstrate a change or evolution should be examined promptly by a board certified dermatologist       Dysplastic Nevi  Dysplastic moles are moles that fit the ABCDE rules of melanoma but are not identified as melanomas when examined under the microscope  They may indicate an increased risk of melanoma in that person  If there is a family history of melanoma, most experts agree that the person may be at an increased risk for developing a melanoma  Experts still do not agree on what dysplastic moles mean in patients without a personal or family history of melanoma  Dysplastic moles are usually larger than common moles and have different colors within it with irregular borders  The appearance can be very similar to a melanoma   Biopsies of dysplastic moles may show abnormalities which are different from a regular mole  Melanoma  Malignant melanoma is a type of skin cancer that can be deadly if it spreads throughout the body  The incidence of melanoma in the [place] is growing faster than any other cancer  Melanoma usually grows near the surface of the skin for a period of time, and then begins to grow deeper into the skin  Once it grows deeper into the skin, the risk of spread to other organs greatly increases  Therefore, early detection and removal of a malignant melanoma may result in a better chance at a complete cure; removal after the tumor has spread may not be as effective, leading to worse clinical outcomes such as death  The true rate of nevus transformation into a melanoma is unknown  It has been estimated that the lifetime risk for any acquired melanocytic nevus on any 21year-old individual transforming into melanoma by age [de-identified] is 0 03% (1 in 3,164) for men and 0 009% (1 in 10,800) for women  The appearance of a "new mole" remains one of the most reliable methods for identifying a malignant melanoma  Occasionally, melanomas appear as rapidly growing, blue-black, dome-shaped bumps within a previous mole or previous area of normal skin  Other times, melanomas are suspected when a mole suddenly appears or changes  Itching, burning, or pain in a pigmented lesion should increase suspicion, but most patients with early melanoma have no skin discomfort whatsoever  Melanoma can occur anywhere on the skin, including areas that are difficult for self-examination  Many melanomas are first noticed by other family members  Suspicious-looking moles may be removed for microscopic examination  You may be able to prevent death from melanoma by doing two simple things:    1  Try to avoid unnecessary sun exposure and protect your skin when it is exposed to the sun    People who live near the equator, people who have intermittent exposures to large amounts of sun, and people who have had sunburns in childhood or adolescence have an increased risk for melanoma  Sun sense and vigilant sun protection may be keys to helping to prevent melanoma  We recommend wearing UPF-rated sun protective clothing and sunglasses whenever possible and applying a moisturizer-sunscreen combination product (SPF 50+) such as Neutrogena Daily Defense to sun exposed areas of skin at least three times a day  2  Have your moles regularly examined by a board certified dermatologist AND by yourself or a family member/friend at home  We recommend that you have your moles examined at least once a year by a board certified dermatologist   Use your birthday as an annual reminder to have your "Birthday Suit" (I e , your skin) examined; it is a nice birthday gift to yourself to know that your skin is healthy appearing! Additionally, at-home self examinations may be helpful for detecting a possible melanoma  Use the ABCDEs we discussed and check your moles once a month at home  LENTIGO       Assessment and Plan:  Based on a thorough discussion of this condition and the management approach to it (including a comprehensive discussion of the known risks, side effects and potential benefits of treatment), the patient (family) agrees to implement the following specific plan:   When outside we recommend using a wide brim hat, sunglasses, long sleeve and pants, sunscreen with SPF 28+ with reapplication every 2 hours, or SPF specific clothing       What is a lentigo? A lentigo is a pigmented flat or slightly raised lesion with a clearly defined edge  Unlike an ephelis (freckle), it does not fade in the winter months  There are several kinds of lentigo  The name lentigo originally referred to its appearance resembling a small lentil  The plural of lentigo is lentigines, although lentigos is also in common use  Who gets lentigines? Lentigines can affect males and females of all ages and races  Solar lentigines are especially prevalent in fair skinned adults  Lentigines associated with syndromes are present at birth or arise during childhood  What causes lentigines? Common forms of lentigo are due to exposure to ultraviolet radiation:   Sun damage including sunburn    Indoor tanning    Phototherapy, especially photochemotherapy (PUVA)    Ionizing radiation, eg radiation therapy, can also cause lentigines  Several familial syndromes associated with widespread lentigines originate from mutations in Artis-MAP kinase, mTOR signaling and PTEN pathways  What are the clinical features of lentigines? Lentigines have been classified into several different types depending on what they look like, where they appear on the body, causative factors, and whether they are associated to other diseases or conditions  Lentigines may be solitary or more often, multiple  Most lentigines are smaller than 5 mm in diameter      Lentigo simplex   A precursor to junctional naevus    Arises during childhood and early adult life    Found on trunk and limbs    Small brown round or oval macule or thin plaque    Jagged or smooth edge    May have a dry surface    May disappear in time  Solar lentigo   A precursor to seborrhoeic keratosis    Found on chronically sun exposed sites such as hands, face, lower legs    May also follow sunburn to shoulders    Yellow, light or dark brown regular or irregular macule or thin plaque    May have a dry surface    Often has moth-eaten outline    Can slowly enlarge to several centimeters in diameter    May disappear, often through the process known as lichenoid keratosis    When atypical in appearance, may be difficult to distinguish from melanoma in situ  Ink spot lentigo   Also known as reticulated lentigo    Few in number compared to solar lentigines    Follows sunburn in very fair skinned individuals    Dark brown to black irregular ink spot-like macule  PUVA lentigo   Similar to ink spot lentigo but follows photochemotherapy (PUVA)    Location anywhere exposed to PUVA  Tanning bed lentigo   Similar to ink spot lentigo but follows indoor tanning    Location anywhere exposed to tanning bed  Radiation lentigo   Occurs in site of irradiation (accidental or therapeutic)    Associated with late-stage radiation dermatitis: epidermal atrophy, subcutaneous fibrosis, keratosis, telangiectasias  Melanotic macule   Mucosal surfaces or adjacent glabrous skin eg lip, vulva, penis, anus    Light to dark brown    Also called mucosal melanosis  Generalised lentigines   Found on any exposed or covered site from early childhood    Small macules may merge to form larger patches    Not associated with a syndrome    Also called lentigines profusa, multiple lentigines  Agminated lentigines   Naevoid eruption of lentigos confined to a single segmental area    Sharp demarcation in midline    May have associated neurological and developmental abnormalities  Patterned lentigines   Inherited tendency to lentigines on face, lips, buttocks, palms, soles    Recognised mainly in people of  ethnicity  Centrofacial neurodysraphic lentiginosis  Associated with mental retardation  Lentiginosis syndromes   Syndromes include LEOPARD/Lawrence, Peutz-Jeghers, Laugier-Hunziker, Moynahan, Xeroderma pigmentosum, myxoma syndromes (COREY, NAME, Ramirez), Ruvalcaba-Myhre-Abreu, Bannayan-Zonnana syndrome, Cowden disease (multiple hamartoma syndrome )    Inheritance is autosomal dominant; sporadic cases common    Widespread lentigines present at birth or arise in early childhood    Associated with neural, endocrine, and mesenchymal tumors    How is the diagnosis made? Lentigines are usually diagnosed clinically by their typical appearance   Concern regarding possibility of melanoma may lead to:   Dermatoscopy    Diagnostic excision biopsy    Histopathology of a lentigo shows:   Thickened epidermis    An increased number of melanocytes along the basal layer of epidermis    Unlike junctional melanocytic naevus, there are no nests of melanocytes    Increased melanin pigment within the keratinocytes    Additional features depending on type of lentigo    In contrast, an ephelis (freckle) shows sun-induced increased melanin within the keratinocytes, without an increase in number of cells  What is the treatment for lentigines? Most lentigines are left alone  Attempts to lighten them may not be successful  The following approaches are used:   SPF 50+ broad-spectrum sunscreen    Hydroquinone bleaching cream    Alpha hydroxy acids    Vitamin C    Retinoids    Azelaic acid    Chemical peels  Individual lesions can be permanently removed using:   Cryotherapy    Intense pulsed light    Pigment lasers    How can lentigines be prevented? Lentigines associated with exposure ultraviolet radiation can be prevented by very careful sun protection  Clothing is more successful at preventing new lentigines than are sunscreens  What is the outlook for lentigines? Lentigines usually persist  They may increase in number with age and sun exposure  Some in sun-protected sites may fade and disappear  RICHARDS ANGIOMAS    Assessment and Plan:  Based on a thorough discussion of this condition and the management approach to it (including a comprehensive discussion of the known risks, side effects and potential benefits of treatment), the patient (family) agrees to implement the following specific plan:   Monitor for changes   Benign, reassured       Assessment and Plan:    Cherry angioma, also known as [employer] spots, are benign vascular skin lesions  A "cherry angioma" is a firm red, blue or purple papule, 0 1-1 cm in diameter  When thrombosed, they can appear black in colour until evaluated with a dermatoscope when the red or purple colour is more easily seen   Cherry angioma may develop on any part of the body but most often appear on the scalp, face, lips and trunk  An angioma is due to proliferating endothelial cells; these are the cells that line the inside of a blood vessel  Angiomas can arise in early life or later in life; the most common type of angioma is a cherry angioma  Cherry angiomas are very common in males and females of any age or race  They are more noticeable in white skin than in skin of colour  They markedly increase in number from about the age of 36  There may be a family history of similar lesions  Eruptive cherry angiomas have been rarely reported to be associated with internal malignancy  The cause of angiomas is unknown  Genetic analysis of cherry angiomas has shown that they frequently carry specific somatic missense mutations in the GNAQ and GNA11 (Q209H) genes, which are involved in other vascular and melanocytic proliferations  Cherry angioma is usually diagnosed clinically and no investigations are necessary for the majority of lesions  It has a characteristic red-clod or lobular pattern on dermatoscopy (called lacunar pattern using conventional pattern analysis)  When there is uncertainty about the diagnosis, a biopsy may be performed  The angioma is composed of venules in a thickened papillary dermis  Collagen bundles may be prominent between the lobules  Cherry angiomas are harmless, so they do not usually have to be treated  Occasionally, they are removed to exclude a malignant skin lesion such as a nodular melanoma or because they are irritated or bleeding (and a subsequent risk for infection)  To decrease friction over the lesions, we recommend Neutrogena Daily Defense SPF 50+ at least 3 times a day      SEBORRHEIC KERATOSIS; NON-INFLAMED  Assessment and Plan:  Based on a thorough discussion of this condition and the management approach to it (including a comprehensive discussion of the known risks, side effects and potential benefits of treatment), the patient (family) agrees to implement the following specific plan:   Monitor for changes   Benign, reassured       Seborrheic Keratosis  A seborrheic keratosis is a harmless warty spot that appears during adult life as a common sign of skin aging  Seborrheic keratoses can arise on any area of skin, covered or uncovered, with the usual exception of the palms and soles  They do not arise from mucous membranes  Seborrheic keratoses can have highly variable appearance  Seborrheic keratoses are extremely common  It has been estimated that over 90% of adults over the age of 61 years have one or more of them  They occur in males and females of all races, typically beginning to erupt in the 35s or 45s  They are uncommon under the age of 21 years  The precise cause of seborrhoeic keratoses is not known  Seborrhoeic keratoses are considered degenerative in nature  As time goes by, seborrheic keratoses tend to become more numerous  Some people inherit a tendency to develop a very large number of them; some people may have hundreds of them  The name "seborrheic keratosis" is misleading, because these lesions are not limited to a seborrhoeic distribution (scalp, mid-face, chest, upper back), nor are they formed from sebaceous glands, nor are they associated with sebum -- which is greasy  Seborrheic keratosis may also be called "SK," "Seb K," "basal cell papilloma," "senile wart," or "barnacle "      Researchers have noted:   Eruptive seborrhoeic keratoses can follow sunburn or dermatitis   Skin friction may be the reason they appear in body folds   Viral cause (e g , human papillomavirus) seems unlikely   Stable and clonal mutations or activation of FRFR3, PIK3CA, MARGUERITE, AKT1 and EGFR genes are found in seborrhoeic keratoses   Seborrhoeic keratosis can arise from solar lentigo   FRFR3 mutations also arise in solar lentigines   These mutations are associated with increased age and location on the head and neck, suggesting a role of ultraviolet radiation in these lesions   Seborrheic keratoses do not harbour tumour suppressor gene mutations   Epidermal growth factor receptor inhibitors, which are used to treat some cancers, often result in an increase in verrucal (warty) keratoses  There is no easy way to remove multiple lesions on a single occasion  Unless a specific lesion is "inflamed" and is causing pain or stinging/burning or is bleeding, most insurance companies do not authorize treatment  XEROSIS ("DRY SKIN")    Assessment and Plan:  Based on a thorough discussion of this condition and the management approach to it (including a comprehensive discussion of the known risks, side effects and potential benefits of treatment), the patient (family) agrees to implement the following specific plan:   Use moisturizer like Eucerin,Cerave or Aveeno Cream 3 times a day for the dry skin               Dry skin refers to skin that feels dry to touch  Dry skin has a dull surface with a rough, scaly quality  The skin is less pliable and cracked  When dryness is severe, the skin may become inflamed and fissured  Although any body site can be dry, dry skin tends to affect the shins more than any other site  Dry skin is lacking moisture in the outer horny cell layer (stratum corneum) and this results in cracks in the skin surface  Dry skin is also called xerosis, xeroderma or asteatosis (lack of fat)  It can affect males and females of all ages  There is some racial variability in water and lipid content of the skin   Dry skin that starts in early childhood may be one of about 20 types of ichthyosis (fish-scale skin)  There is often a family history of dry skin   Dry skin is commonly seen in people with atopic dermatitis   Nearly everyone > 60 years has dry skin  Dry skin that begins later may be seen in people with certain diseases and conditions     Postmenopausal women   Hypothyroidism   Chronic renal disease    Malnutrition and weight loss    Subclinical dermatitis    Treatment with certain drugs such as oral retinoids, diuretics and epidermal growth factor receptor inhibitors    People exposed to a dry environment may experience dry skin   Low humidity: in desert climates or cool, windy conditions    Excessive air conditioning    Direct heat from a fire or fan heater    Excessive bathing    Contact with soap, detergents and solvents    Inappropriate topical agents such as alcohol    Frictional irritation from rough clothing or abrasives    Dry skin is due to abnormalities in the integrity of the barrier function of the stratum corneum, which is made up of corneocytes   There is an overall reduction in the lipids in the stratum corneum   Ratio of ceramides, cholesterol and free fatty acids may be normal or altered   There may be a reduction in the proliferation of keratinocytes   Keratinocyte subtypes change in dry skin with a decrease in keratins K1, K10 and increase in K5, K14     Involucrin (a protein) may be expressed early, increasing cell stiffness   The result is retention of corneocytes and reduced water-holding capacity  The inherited forms of ichthyosis are due to loss of function mutations in various genes (listed in parentheses below)  The clinical features of ichthyosis depend on the specific type of ichthyosis:   Ichthyosis vulgaris (FLG)   Recessive X-linked ichthyosis (STS)    Autosomal recessive congenital ichthyosis (ABCA12, TGM1, ALOXE3)    Keratinopathic ichthyoses (KRT1, KRT10, KRT2)    Acquired ichthyosis may be due to:   Metabolic factors: thyroid deficiency    Illness: lymphoma, internal malignancy, sarcoidosis, HIV infection    Drugs: nicotinic acid, kava, protein kinase inhibitors (eg EGFR inhibitors), hydroxyurea  Complications of dry skin:  Dry areas of skin may become itchy, indicating a form of eczema/dermatitis has developed     Atopic eczema -- especially in people with ichthyosis vulgaris    Eczema craquelé -- especially in elderly people  Also called asteatotic eczema    A dry form of nummular dermatitis/discoid eczema -- especially in people that wash their skin excessively  When the dry skin of an elderly person is itchy without a visible rash, it is sometimes called winter itch, 7th age itch, senile pruritus or chronic pruritus of the elderly  Other complications of dry skin may include:   Skin infection when bacteria or viruses penetrate a break in the skin surface    Overheating, especially in some forms of ichthyosis    Food allergy, eg, to peanuts, has been associated with filaggrin mutations    Contact allergy, eg, to nickel, has also been correlated with barrier function defects  How is the type of dry skin diagnosed? The type of dry skin is diagnosed by careful history and examination  In children:   Family history    Age of onset    Appearance at birth, if known    Distribution of dry skin    Other features, eg eczema, abnormal nails, hair, dentition, sight, hearing  In adults:   Medical history    Medications and topical preparations    Bathing frequency and use of soap    Evaluation of environmental factors that may contribute to dry skin  What is the treatment for dry skin? The mainstay of treatment of dry skin and ichthyosis is moisturisers/emollients  They should be applied liberally and often enough to:   Reduce itch    Improve the barrier function    Prevent entry of irritants, bacteria    Reduce transepidermal water loss  When considering which emollient is most suitable, consider:   Severity of the dryness    Tolerance    Personal preference    Cost and availability  Emollients generally work best if applied to damp skin, if pH is below 7 (acidic), and if containing humectants such as urea or propylene glycol    Additional treatments include:   Topical steroid if itchy or there is dermatitis -- choose an emollient base    Topical calcineurin inhibitors if topical steroids are unsuitable  How can dry skin be prevented? Eliminate aggravating factors:   Reduce the frequency of bathing   A humidifier in winter and air conditioner in summer    Compare having a short shower with a prolonged soak in a bath   Use lukewarm, not hot, water   Replace standard soap with a substitute such as a synthetic detergent cleanser, water-miscible emollient, bath oil, anti-pruritic tar oil, colloidal oatmeal etc     Apply an emollient liberally and often, particularly shortly after bathing, and when itchy  The drier the skin, the thicker this should be, especially on the hands  What is the outlook for dry skin? A tendency to dry skin may persist life-long, or it may improve once contributing factors are controlled  PSORIASIS    Assessment and Plan:  Based on a thorough discussion of this condition and the management approach to it (including a comprehensive discussion of the known risks, side effects and potential benefits of treatment), the patient (family) agrees to implement the following specific plan:   Recommend not applying Clobetasol Cream daily as it is a very strong steroid and will thin your skin   If you start a flare of Psoriasis please send a picture via My Chart to Dr Geremias Dexter and she will send an appropriate medication to your pharmacy   Follow up with Rheumatology for Psoriatic Arthritis        Psoriasis is a chronic inflammatory condition that causes the body to make new skin cells in days rather than weeks  As these cells pile up on the surface of the skin, you may see thick, scaly patches of thickened skin  Psoriasis affects 2-4% of males and females  It can start at any age including childhood, with peaks of onset at 15-25 years and 50-60 years  It tends to persist lifelong, fluctuating in extent and severity   It is particularly common in Caucasians but may affect people of any race  About one-third of patients with psoriasis have family members with psoriasis  Psoriasis is multifactorial  It is classified as an immune-mediated inflammatory disease (IMID)  Genetic factors are important and influence the type of psoriasis and response to treatment  What are the signs and symptoms of psoriasis? There are many different types of psoriasis that each have present uniquely  The types of psoriasis include:    Plaque psoriasis: About 80% to 90% of people who have psoriasis develop this type  When plaque psoriasis appears, you may see:  Plaque psoriasis usually presents with symmetrically distributed, red, scaly plaques with well-defined edges  The scale is typically silvery white, except in skin folds where the plaques often appear shiny and they may have a moist peeling surface  The most common sites are scalp, elbows and knees, but any part of the skin can be involved  The plaques are usually very persistent without treatment  Itch is mostly mild but may be severe in some patients, leading to scratching and lichenification (thickened leathery skin with increased skin markings)  Painful skin cracks or fissures may occur  When psoriatic plaques clear up, they may leave brown or pale marks that can be expected to fade over several months  Guttate psoriasis: When someone gets this type of psoriasis, you often see tiny bumps appear on the skin quite suddenly  The bumps tend to cover much of the torso, legs, and arms  Sometimes, the bumps also develop on the face, scalp, and ears  No matter where they appear, the bumps tend to be:    Small and scaly   Crook-colored to pink   Temporary, clearing in a few weeks or months without treatment  When guttate psoriasis clears, it may never return  Why this happens is still a bit of a mystery  Guttate psoriasis tends to develop in children and young adults who've had an infection, such as strep throat   It's possible that when the infection clears so does guttate psoriasis  It's also possible to have:   Guttate psoriasis for life   See the guttate psoriasis clear and plaque psoriasis develop later in life   Plaque psoriasis when you develop guttate psoriasis  There's no way to predict what will happen after the first flare-up of guttate psoriasis clears  Inverse psoriasis: This type of psoriasis develops in areas where skin touches skin, such as the armpits, genitals, and crease of the buttocks  Where the inverse psoriasis appears, you're likely to notice:   Smooth, red patches of skin that look raw   Little, if any, silvery-white coating   Sore or painful skin  Other names for this type of psoriasis are intertriginous psoriasis or flexural psoriasis  Pustular psoriasis: This type of psoriasis causes pus-filled bumps that usually appear only on the feet and hands  While the pus-filled bumps may look like an infection, the skin is not infected  The bumps don't contain bacteria or anything else that could cause an infection  Where pustular psoriasis appears, you tend to notice:   Red, swollen skin that is dotted with pus-filled bumps   Extremely sore or painful skin   Brown dots (and sometimes scale) appear as the pus-filled bumps dry  Pustular psoriasis can make just about any activity that requires your hands or feet, such as typing or walking, unbearably painful  Pustular psoriasis (generalized): Serious and life-threatening, this rare type of psoriasis causes pus-filled bumps to develop on much of the skin  Also called von Zumbusch psoriasis, a flare-up causes this sequence of events:  1  Skin on most of the body suddenly turns dry, red, and tender  2  Within hours, pus-filled bumps cover most of the skin  3  Often within a day, the pus-filled bumps break open and pools of pus leak onto the skin    4  As the pus dries (usually within 24 to 48 hours), the skin dries out and peels (as shown in this picture)  5  When the dried skin peels off, you see a smooth, glazed surface  6  In a few days or weeks, you may see a new crop of pus-filled bumps covering most of the skin, as the cycle repeats itself  Anyone with pustular psoriasis also feels very sick, and may develop a fever, headache, muscle weakness, and other symptoms  Medical care is often necessary to save the person's life  Erythrodermic psoriasis: Serious and life-threatening, this type of psoriasis requires immediate medical care  When someone develops erythrodermic psoriasis, you may notice:   Skin on most of the body looks burnt   Chills, fever, and the person looks extremely ill   Muscle weakness, a rapid pulse, and severe itch  The person may also be unable to keep warm, so hypothermia can set in quickly  Most people who develop this type of psoriasis already have another type of psoriasis  Before developing erythrodermic psoriasis, they often notice that their psoriasis is worsening or not improving with treatment  If you notice either of these happening, see a board-certified dermatologist     Nails    Nail psoriasis: With any type of psoriasis, you may see changes to your fingernails or toenails  About half of the people who have plaque psoriasis see signs of psoriasis on their fingernails at some point2  When psoriasis affects the nails, you may notice:   Tiny dents in your nails (called nail pits)   White, yellow, or brown discoloration under one or more nails   Crumbling, rough nails   A nail lifting up so that it's no longer attached   Buildup of skin cells beneath one or more nails, which lifts up the nail  Treatment and proper nail care can help you control nail psoriasis  Psoriatic arthritis: If you have psoriasis, it's important to pay attention to your joints  Some people who have psoriasis develop a type of arthritis called psoriatic arthritis  This is more likely to occur if you have severe psoriasis    Most people notice psoriasis on their skin years before they develop psoriatic arthritis  It's also possible to get psoriatic arthritis before psoriasis, but this is less common  When psoriatic arthritis develops, the signs can be subtle  At first, you may notice:   A swollen and tender joint, especially in a finger or toe   Heel pain   Swelling on the back of your leg, just above your heel   Stiffness in the morning that fades during the day  Like psoriasis, psoriatic arthritis cannot be cured  Treatment can prevent psoriatic arthritis from worsening, which is important  Allowed to progress, psoriatic arthritis can become disabling  Diagnosis and treatment of psoriasis   Psoriasis is usually diagnosed by clinical features, and skin biopsy if necessary  It is important to decrease factors that aggravate psoriasis  These include treating streptococcal infections, minimizing skin injuries, avoiding sun exposure if it exacerbates psoriasis, smoking, alcohol usage, decreasing stress, and maintaining an optimal body weight  Certain medications such as lithium, beta blockers, antimalarials, and NSAIDs have also been implicated  Suddenly stopping oral steroids or strong topical steroids can cause rebound disease  There are many categories of psoriasis treatments available  Topical therapy  Mild psoriasis is generally treated with topical agents alone  Which treatment is selected may depend on body site, extent and severity of psoriasis   Emollients   Coal tar preparations   Dithranol   Salicylic acid   Vitamin D analogue (calcipotriol)   Topical corticosteroids   Calcineurin inhibitor (tacrolimus, pimecrolimus)  Phototherapy  Most psoriasis centres offer phototherapy with ultraviolet (UV) radiation, often in combination with topical or systemic agents   Types of phototherapy include   Narrowband UVB   Broadband UVB   Photochemotherapy (PUVA)   Targeted phototherapy  Systemic therapy  Moderate to severe psoriasis warrants treatment with a systemic agent and/or phototherapy  The most common treatments are:   Methotrexate   Ciclosporin   Acitretin  Other medicines occasionally used for psoriasis include:   Mycophenolate   Apremilast   Hydroxyurea   Azathioprine   6-mercaptopurine  Systemic corticosteroids are best avoided due to a risk of severe withdrawal flare of psoriasis and adverse effects  Biologics or targeted therapies are reserved for conventional treatment-resistant severe psoriasis, mainly because of expense, as side effects compare favorably with other systemic agents  These include:   Anti-tumour necrosis factor-alpha antagonists (anti-TNF?) infliximab, adalimumab and etanercept   The interleukin (IL)-12/23 antagonist ustekinumab   IL-17 antagonists such as secukinumab  Many other monoclonal antibodies are under investigation in the treatment of psoriasis

## 2021-05-06 ENCOUNTER — TELEPHONE (OUTPATIENT)
Dept: RHEUMATOLOGY | Facility: CLINIC | Age: 71
End: 2021-05-06

## 2021-05-06 NOTE — TELEPHONE ENCOUNTER
Within his result note for chest xray you had indicated about proceeding with authorization for [facility]  I do not recall having any messages regarding this, and there is nothing in the chart  Am I missing something or have you not gotten around to sending me a message about obtaining auth yet?

## 2021-05-09 NOTE — TELEPHONE ENCOUNTER
Patient's latest note is in  Please work on prior auth for this patient's Otezla starter pack and maintenance dose of 30mg tab twice a day for his psoriatic arthritis  [address] form was filled out and signed in clinic  He has failed methotrexate  Is currently on sulfasalazine and clobetasol cream  Though his TB test was positive, his chest x-ray was negative, and patient does not have history of active or latent TB; has had false positive TB tests in the past  His viral hepatitis panel is negative; he is up to date on his immunizations, and does not have any active infections

## 2021-05-11 DIAGNOSIS — L40.50 PSORIATIC ARTHRITIS (HCC): Primary | ICD-10-CM

## 2021-05-11 RX ORDER — LEFLUNOMIDE 10 MG/1
10 TABLET ORAL DAILY
Qty: 30 TABLET | Refills: 6 | Status: SHIPPED | OUTPATIENT
Start: 2021-05-11 | End: 2021-05-17 | Stop reason: SDUPTHER

## 2021-05-11 NOTE — TELEPHONE ENCOUNTER
Please let him know that I instead want him to start taking leflunomide 10mg daily in addition to his sulfasalazine regimen to better help his psoriatic arthritis  Sending the prescription to his pharmacy  This would be a more affordable option for him      Thanks,  HM

## 2021-05-11 NOTE — TELEPHONE ENCOUNTER
I spoke with the pharmacy, they informed me they do not have any pharmacy benefits on file for him  I reached out to the patient, he confirmed he does not have pharmacy benefits and he pays out of pocket for meds and/or uses discount cards

## 2021-05-12 NOTE — TELEPHONE ENCOUNTER
Please let him know that if he uses GoodRx, he should be able to get it at [facility], or [facility] for $30  Does he want me to send the prescription to any of those pharmacies? If so, which specific location?     Thanks,  HM

## 2021-05-12 NOTE — TELEPHONE ENCOUNTER
Patient called because he went to the pharmacy yesterday to  the medication leflunomide (ARAVA) 10 MG  and could not afford it

## 2021-05-12 NOTE — TELEPHONE ENCOUNTER
I spoke with the patient  He stated even with GoodRX it would have been approximately $300 and then the sulfasalazine is about $90 (almost $400 total)  I did explain to him about Ernie Lepe or [facility] being approximately $30  He will look into things  If it is determined he can afford it he will call back with a specific location

## 2021-05-17 RX ORDER — LEFLUNOMIDE 10 MG/1
10 TABLET ORAL DAILY
Qty: 30 TABLET | Refills: 6 | Status: SHIPPED | OUTPATIENT
Start: 2021-05-17 | End: 2021-05-20 | Stop reason: SDUPTHER

## 2021-05-17 NOTE — TELEPHONE ENCOUNTER
Patient is asking if it can be sent over to the [address] instead of [facility]   Fax #[phone]

## 2021-05-18 NOTE — TELEPHONE ENCOUNTER
I sent the prescription to the printer, please have me sign it and fax to the [place] pharmacy number provided      Thanks,  HM

## 2021-05-19 ENCOUNTER — TELEPHONE (OUTPATIENT)
Dept: OBGYN CLINIC | Facility: HOSPITAL | Age: 71
End: 2021-05-19

## 2021-05-19 NOTE — TELEPHONE ENCOUNTER
Patient is unable to contact [place]  He would like to use [facility] just added  Please return call

## 2021-05-20 DIAGNOSIS — L40.50 PSORIATIC ARTHRITIS (HCC): ICD-10-CM

## 2021-05-20 RX ORDER — LEFLUNOMIDE 10 MG/1
10 TABLET ORAL DAILY
Qty: 30 TABLET | Refills: 6 | Status: SHIPPED | OUTPATIENT
Start: 2021-05-20 | End: 2021-07-20 | Stop reason: SDUPTHER

## 2021-05-27 ENCOUNTER — APPOINTMENT (OUTPATIENT)
Dept: LAB | Facility: IMAGING CENTER | Age: 71
End: 2021-05-27
Payer: MEDICARE

## 2021-05-27 DIAGNOSIS — Z79.899 HIGH RISK MEDICATION USE: ICD-10-CM

## 2021-05-27 DIAGNOSIS — R73.9 HYPERGLYCEMIA: ICD-10-CM

## 2021-05-27 DIAGNOSIS — Z12.5 PROSTATE CANCER SCREENING: ICD-10-CM

## 2021-05-27 DIAGNOSIS — I10 ESSENTIAL HYPERTENSION: ICD-10-CM

## 2021-05-27 DIAGNOSIS — E78.2 MIXED HYPERLIPIDEMIA: ICD-10-CM

## 2021-05-27 DIAGNOSIS — L40.50 PSORIATIC ARTHRITIS (HCC): ICD-10-CM

## 2021-05-27 LAB
ALBUMIN SERPL BCP-MCNC: 3.6 G/DL (ref 3.5–5)
ALP SERPL-CCNC: 86 U/L (ref 46–116)
ALT SERPL W P-5'-P-CCNC: 47 U/L (ref 12–78)
ANION GAP SERPL CALCULATED.3IONS-SCNC: 2 MMOL/L (ref 4–13)
AST SERPL W P-5'-P-CCNC: 35 U/L (ref 5–45)
BASOPHILS # BLD AUTO: 0.06 THOUSANDS/ΜL (ref 0–0.1)
BASOPHILS NFR BLD AUTO: 1 % (ref 0–1)
BILIRUB SERPL-MCNC: 0.56 MG/DL (ref 0.2–1)
BUN SERPL-MCNC: 19 MG/DL (ref 5–25)
CALCIUM SERPL-MCNC: 9.9 MG/DL (ref 8.3–10.1)
CHLORIDE SERPL-SCNC: 104 MMOL/L (ref 100–108)
CHOLEST SERPL-MCNC: 195 MG/DL (ref 50–200)
CO2 SERPL-SCNC: 32 MMOL/L (ref 21–32)
CREAT SERPL-MCNC: 0.82 MG/DL (ref 0.6–1.3)
CRP SERPL QL: <3 MG/L
EOSINOPHIL # BLD AUTO: 0.28 THOUSAND/ΜL (ref 0–0.61)
EOSINOPHIL NFR BLD AUTO: 5 % (ref 0–6)
ERYTHROCYTE [DISTWIDTH] IN BLOOD BY AUTOMATED COUNT: 12.5 % (ref 11.6–15.1)
EST. AVERAGE GLUCOSE BLD GHB EST-MCNC: 114 MG/DL
GFR SERPL CREATININE-BSD FRML MDRD: 88 ML/MIN/1.73SQ M
GLUCOSE P FAST SERPL-MCNC: 96 MG/DL (ref 65–99)
HBA1C MFR BLD: 5.6 %
HCT VFR BLD AUTO: 45.9 % (ref 36.5–49.3)
HDLC SERPL-MCNC: 80 MG/DL
HGB BLD-MCNC: 14.8 G/DL (ref 12–17)
IMM GRANULOCYTES # BLD AUTO: 0.02 THOUSAND/UL (ref 0–0.2)
IMM GRANULOCYTES NFR BLD AUTO: 0 % (ref 0–2)
LDLC SERPL CALC-MCNC: 95 MG/DL (ref 0–100)
LYMPHOCYTES # BLD AUTO: 1.38 THOUSANDS/ΜL (ref 0.6–4.47)
LYMPHOCYTES NFR BLD AUTO: 26 % (ref 14–44)
MCH RBC QN AUTO: 31.8 PG (ref 26.8–34.3)
MCHC RBC AUTO-ENTMCNC: 32.2 G/DL (ref 31.4–37.4)
MCV RBC AUTO: 99 FL (ref 82–98)
MONOCYTES # BLD AUTO: 0.73 THOUSAND/ΜL (ref 0.17–1.22)
MONOCYTES NFR BLD AUTO: 14 % (ref 4–12)
NEUTROPHILS # BLD AUTO: 2.87 THOUSANDS/ΜL (ref 1.85–7.62)
NEUTS SEG NFR BLD AUTO: 54 % (ref 43–75)
NRBC BLD AUTO-RTO: 0 /100 WBCS
PLATELET # BLD AUTO: 212 THOUSANDS/UL (ref 149–390)
PMV BLD AUTO: 11.2 FL (ref 8.9–12.7)
POTASSIUM SERPL-SCNC: 4.2 MMOL/L (ref 3.5–5.3)
PROT SERPL-MCNC: 7.1 G/DL (ref 6.4–8.2)
PSA SERPL-MCNC: 1.5 NG/ML (ref 0–4)
RBC # BLD AUTO: 4.65 MILLION/UL (ref 3.88–5.62)
SODIUM SERPL-SCNC: 138 MMOL/L (ref 136–145)
TRIGL SERPL-MCNC: 102 MG/DL
TSH SERPL DL<=0.05 MIU/L-ACNC: 2.07 UIU/ML (ref 0.36–3.74)
WBC # BLD AUTO: 5.34 THOUSAND/UL (ref 4.31–10.16)

## 2021-05-27 PROCEDURE — 84443 ASSAY THYROID STIM HORMONE: CPT

## 2021-05-27 PROCEDURE — 80061 LIPID PANEL: CPT

## 2021-05-27 PROCEDURE — 36415 COLL VENOUS BLD VENIPUNCTURE: CPT

## 2021-05-27 PROCEDURE — 85025 COMPLETE CBC W/AUTO DIFF WBC: CPT

## 2021-05-27 PROCEDURE — G0103 PSA SCREENING: HCPCS

## 2021-05-27 PROCEDURE — 80053 COMPREHEN METABOLIC PANEL: CPT

## 2021-05-27 PROCEDURE — 86140 C-REACTIVE PROTEIN: CPT

## 2021-05-27 PROCEDURE — 83036 HEMOGLOBIN GLYCOSYLATED A1C: CPT

## 2021-06-03 ENCOUNTER — TELEPHONE (OUTPATIENT)
Dept: FAMILY MEDICINE CLINIC | Facility: CLINIC | Age: 71
End: 2021-06-03

## 2021-07-20 ENCOUNTER — OFFICE VISIT (OUTPATIENT)
Dept: RHEUMATOLOGY | Facility: CLINIC | Age: 71
End: 2021-07-20
Payer: MEDICARE

## 2021-07-20 ENCOUNTER — APPOINTMENT (OUTPATIENT)
Dept: LAB | Facility: MEDICAL CENTER | Age: 71
End: 2021-07-20
Payer: MEDICARE

## 2021-07-20 VITALS
HEIGHT: 70 IN | DIASTOLIC BLOOD PRESSURE: 75 MMHG | HEART RATE: 72 BPM | SYSTOLIC BLOOD PRESSURE: 123 MMHG | WEIGHT: 155.8 LBS | BODY MASS INDEX: 22.3 KG/M2

## 2021-07-20 DIAGNOSIS — Z79.899 HIGH RISK MEDICATION USE: ICD-10-CM

## 2021-07-20 DIAGNOSIS — L40.50 PSORIATIC ARTHRITIS (HCC): Primary | ICD-10-CM

## 2021-07-20 DIAGNOSIS — L40.9 PSORIASIS: ICD-10-CM

## 2021-07-20 LAB
ALBUMIN SERPL BCP-MCNC: 3.4 G/DL (ref 3.5–5)
ALP SERPL-CCNC: 74 U/L (ref 46–116)
ALT SERPL W P-5'-P-CCNC: 43 U/L (ref 12–78)
ANION GAP SERPL CALCULATED.3IONS-SCNC: 2 MMOL/L (ref 4–13)
AST SERPL W P-5'-P-CCNC: 33 U/L (ref 5–45)
BASOPHILS # BLD AUTO: 0.04 THOUSANDS/ΜL (ref 0–0.1)
BASOPHILS NFR BLD AUTO: 1 % (ref 0–1)
BILIRUB SERPL-MCNC: 0.33 MG/DL (ref 0.2–1)
BUN SERPL-MCNC: 14 MG/DL (ref 5–25)
CALCIUM ALBUM COR SERPL-MCNC: 10 MG/DL (ref 8.3–10.1)
CALCIUM SERPL-MCNC: 9.5 MG/DL (ref 8.3–10.1)
CHLORIDE SERPL-SCNC: 107 MMOL/L (ref 100–108)
CO2 SERPL-SCNC: 32 MMOL/L (ref 21–32)
CREAT SERPL-MCNC: 0.83 MG/DL (ref 0.6–1.3)
CRP SERPL QL: <3 MG/L
EOSINOPHIL # BLD AUTO: 0.3 THOUSAND/ΜL (ref 0–0.61)
EOSINOPHIL NFR BLD AUTO: 6 % (ref 0–6)
ERYTHROCYTE [DISTWIDTH] IN BLOOD BY AUTOMATED COUNT: 12.1 % (ref 11.6–15.1)
ERYTHROCYTE [SEDIMENTATION RATE] IN BLOOD: 12 MM/HOUR (ref 0–19)
GFR SERPL CREATININE-BSD FRML MDRD: 87 ML/MIN/1.73SQ M
GLUCOSE P FAST SERPL-MCNC: 87 MG/DL (ref 65–99)
HCT VFR BLD AUTO: 43.1 % (ref 36.5–49.3)
HGB BLD-MCNC: 13.7 G/DL (ref 12–17)
IMM GRANULOCYTES # BLD AUTO: 0.02 THOUSAND/UL (ref 0–0.2)
IMM GRANULOCYTES NFR BLD AUTO: 0 % (ref 0–2)
LYMPHOCYTES # BLD AUTO: 1.16 THOUSANDS/ΜL (ref 0.6–4.47)
LYMPHOCYTES NFR BLD AUTO: 23 % (ref 14–44)
MCH RBC QN AUTO: 31.1 PG (ref 26.8–34.3)
MCHC RBC AUTO-ENTMCNC: 31.8 G/DL (ref 31.4–37.4)
MCV RBC AUTO: 98 FL (ref 82–98)
MONOCYTES # BLD AUTO: 0.74 THOUSAND/ΜL (ref 0.17–1.22)
MONOCYTES NFR BLD AUTO: 15 % (ref 4–12)
NEUTROPHILS # BLD AUTO: 2.71 THOUSANDS/ΜL (ref 1.85–7.62)
NEUTS SEG NFR BLD AUTO: 55 % (ref 43–75)
NRBC BLD AUTO-RTO: 0 /100 WBCS
PLATELET # BLD AUTO: 172 THOUSANDS/UL (ref 149–390)
PMV BLD AUTO: 11.9 FL (ref 8.9–12.7)
POTASSIUM SERPL-SCNC: 3.6 MMOL/L (ref 3.5–5.3)
PROT SERPL-MCNC: 6.7 G/DL (ref 6.4–8.2)
RBC # BLD AUTO: 4.41 MILLION/UL (ref 3.88–5.62)
SODIUM SERPL-SCNC: 141 MMOL/L (ref 136–145)
WBC # BLD AUTO: 4.97 THOUSAND/UL (ref 4.31–10.16)

## 2021-07-20 PROCEDURE — 86140 C-REACTIVE PROTEIN: CPT | Performed by: INTERNAL MEDICINE

## 2021-07-20 PROCEDURE — 85025 COMPLETE CBC W/AUTO DIFF WBC: CPT | Performed by: INTERNAL MEDICINE

## 2021-07-20 PROCEDURE — 36415 COLL VENOUS BLD VENIPUNCTURE: CPT | Performed by: INTERNAL MEDICINE

## 2021-07-20 PROCEDURE — 99215 OFFICE O/P EST HI 40 MIN: CPT | Performed by: INTERNAL MEDICINE

## 2021-07-20 PROCEDURE — 85652 RBC SED RATE AUTOMATED: CPT | Performed by: INTERNAL MEDICINE

## 2021-07-20 PROCEDURE — 80053 COMPREHEN METABOLIC PANEL: CPT | Performed by: INTERNAL MEDICINE

## 2021-07-20 RX ORDER — TRIAMCINOLONE ACETONIDE 1 MG/G
CREAM TOPICAL 2 TIMES DAILY
Qty: 80 G | Refills: 3 | Status: SHIPPED | OUTPATIENT
Start: 2021-07-20 | End: 2022-01-18 | Stop reason: SDUPTHER

## 2021-07-20 RX ORDER — LEFLUNOMIDE 20 MG/1
20 TABLET ORAL DAILY
Qty: 90 TABLET | Refills: 1 | Status: SHIPPED | OUTPATIENT
Start: 2021-07-20 | End: 2022-01-18 | Stop reason: SDUPTHER

## 2021-07-20 RX ORDER — SULFASALAZINE 500 MG/1
TABLET ORAL
Qty: 360 TABLET | Refills: 1 | Status: SHIPPED | OUTPATIENT
Start: 2021-07-20 | End: 2022-01-18 | Stop reason: SDUPTHER

## 2021-07-20 NOTE — PATIENT INSTRUCTIONS
Do labs now an before next clinic visit  Stop clobetasol cream, can use triamcinolone cream as needed for psoriasis  Continue sulfasalazine 2 tabs twice a day  Increase leflunomide to 20mg daily    Return to clinic in 6 months    Psoriatic Arthritis in Adults    What is psoriatic arthritis? -- Psoriatic arthritis is a condition that causes joint pain, swelling, and stiffness  It happens in people who have a long-term skin condition called psoriasis  People with psoriasis have patches of thick, red skin that are often covered by silver or white scales  Doctors don't know what causes psoriasis or psoriatic arthritis  What are the symptoms of psoriatic arthritis? -- Psoriatic arthritis causes pain, stiffness, and swelling in the affected joints  It can also affect the spine in some people  Because of the joint and spine problems, people can have trouble moving their body  Stiffness in the joints or low back is usually worse in the morning and lasts 30 minutes or longer  It usually gets better with exercise  Psoriatic arthritis can affect joints on one or both sides of the body  It usually affects more than one joint  In addition to joint symptoms (and the skin symptoms of psoriasis), people sometimes have other symptoms  These can include:  ? Swelling of a finger or toe, or the hands or feet  ? Swelling and pain in the back of the ankle or in the heel   ? Nail symptoms - The nails can look "pitted," as if they were pricked by a pin  The nail can also come up off the nail bed  ? Eye pain or redness  Is there a test for psoriatic arthritis? -- Yes  Your doctor or nurse will ask about your symptoms and do an exam  He or she will order X-rays of your painful joints  He or she might order an imaging test called an MRI  Imaging tests create pictures of the inside of the body  To check that another condition isn't causing your symptoms, your doctor or nurse might also order:  ?Blood tests  ? Lab tests on a sample of fluid from a swollen joint - To get a sample of fluid, the doctor will put a thin needle in your joint  How is psoriatic arthritis treated? -- There is no cure for psoriatic arthritis, but different treatments can help ease and control symptoms  Treatment for joint symptoms usually involves one or more of the following:  ?Medicines called nonsteroidal antiinflammatory drugs, or "NSAIDs" for short - Examples of NSAIDs are aspirin, ibuprofen (sample brand names: Advil, Motrin), and naproxen (sample brand name: Aleve)  ? Medicines that are usually used to treat other types of arthritis - Some of these include methotrexate and leflunomide  ? Medicines that block a substance called tumor necrosis factor, or "TNF" for short - TNF plays a role in psoriasis and psoriatic arthritis  Medicines that block TNF are called "anti-TNF" medicines  Examples include etanercept (brand name: Enbrel) and adalimumab (brand name: Humira)  ?Other medicines - If the options above don't help, your doctor might suggest trying a different medicine  Examples include ustekinumab (brand name: Ricardo Cantrell), secukinumab (brand name: Cosentyx), tofacitinib (brand name: To Ornelas), abatacept (brand name: Anne Marie Mistry), and apremilast (brand name: Shelli Dyyanely)  ? Shots of medicines called steroids that go into the painful joint - These are not the same as the steroids some athletes take illegally  These steroids help reduce swelling and pain  ? Heat - Heat, especially in the morning, can help reduce pain and stiffness  Do not use heat for longer than 20 minutes at a time  Also, do not use anything too hot that could burn your skin  ? Physical and occupational therapy - This involves learning exercises, movements, and ways of doing everyday tasks  ? Special shoe inserts (called "orthotics") - These can help keep your feet, ankles, and knees in the proper position  ?Treatment for psoriatic arthritis is usually long term   That's because even after symptoms get better, they sometimes return later on  Is there anything I can do on my own to feel better? -- Yes  It is very important that you stay active  You might want to avoid being active because you are in pain  But this can make things worse  It can make your muscles weak and your joints stiffer than they already are  Your doctor, nurse, or physical therapist can help you figure out which activities and exercises are right for you

## 2021-07-20 NOTE — PROGRESS NOTES
Assessment and Plan: Gonzalo Leal is a 65 y o   male who presents for follow-up of his psoriatic arthritis, Which is better controlled  Do labs now and before next clinic visit  Stop clobetasol cream, can use triamcinolone cream as needed for psoriasis  Continue sulfasalazine 2 tabs twice a day  Increase leflunomide to 20mg daily    Return to clinic in 6 months    Plan:   Diagnoses and all orders for this visit:    Psoriatic arthritis ([address] )  -     sulfaSALAzine (AZULFIDINE) 500 mg tablet; Take 2 tabs twice a day  -     leflunomide (ARAVA) 20 MG tablet; Take 1 tablet (20 mg total) by mouth daily  -     CBC and differential  -     Comprehensive metabolic panel  -     C-reactive protein  -     Sedimentation rate, automated  -     CBC and differential; Future  -     Comprehensive metabolic panel; Future    Psoriasis  -     triamcinolone (KENALOG) 0 1 % cream; Apply topically 2 (two) times a day    High risk medication use  -     CBC and differential; Future  -     Comprehensive metabolic panel; Future    High risk medication use - Benefits and risks of leflunomide use, including but not limited to gastrointestinal disturbances such as nausea, diarrhea, stomatitis, hair loss, fatigue, leukopenia, hepatotoxicity were discussed with the patient  CBC, CMP will be monitored regularly  Follow-up plan: Return to clinic in 3 months        Rheumatic Disease Summary  Lilia Fitzpatrick a 66 y  o  male who originally presented on 4/2/20 as a Rheumatology consult referred by his Marsha Dykes MD for evaluation of possible inflammatory arthritis involving the hands  Patient's clinical picture of history of asymmetric painful and swollen joints of hands, and psoriasis seemed consistent with psoriatic arthritis  His DAS28 score at initial visit was 3 32, consistent with moderate inflammatory disease activity   However, since patient's arthritis symptoms were not bothering him at the time, decided to conservatively approach treatment with diclofenac gel to apply to painful joints as needed  For his psoriasis, mainly involving his head, he can continue his current steroid ointment as needed  Ordered inflammatory markers and HLA-B27 for psoriatic arthritis work-up; HLA-B27 returned negative but his ESR was slightly elevated  He next presented on 7/14/20 for follow-up of likely psoriatic arthritis  Ordered [facility] hand ultrasound to evaluate for extent of inflammatory arthritis changes, which showed significant psoriatic arthritis changes  Continued diclofenac gel prn for joint pain, and Kenalog ointment prn for psoriasis  Started him on methotrexate 10mg po weekly with daily folic acid  He followed up in 12/2020 with Dr Giacomo Habermann, who increased his methotrexate to 15mg po weekly with daily folic acid  4/22/21: Psoriatic arthritis is not controlled  Patient self-discontinued methotrexate 2-3 weeks ago, since it was not helping his psoriasis or arthritis symptoms  Prescribed clobetasol cream for him to apply to his psoriasis as needed  Reminded patient to schedule dermatology appointment  To address his psoriatic arthritis symptoms, initiated patient on sulfasalazine to be ultimately increased to the therapeutic dose of 1,000 mg p o  b i d   Patient will also benefit from the addition of Nelson Mancuso for management of both his psoriasis and psoriatic arthritis; worked on prior Karina Winslow but it was denied        HPI  Rhonda Storey is a 66 y o   male who presents for follow-up of his psoriatic arthritis  Last clinic visit was 04/22/2021  Patient admits that his joint pain has improved; still has some swelling in his right hand  He has been tolerating leflunomide 10 mg p o  daily and sulfasalazine pretty well  Diclofenac gel did not help his joint pain  He admits to having 10 minutes of morning stiffness in his hands  Takes daily Vit  D      The following portions of the patient's history were reviewed and updated as appropriate: allergies, current medications, past family history, past medical history, past social history, past surgical history and problem list     Review of Systems:   Review of Systems   Constitutional: Negative for chills, fatigue, fever and unexpected weight change  HENT: Negative for mouth sores and trouble swallowing  Eyes: Negative for pain and visual disturbance  Dry eyes   Respiratory: Negative for cough and shortness of breath  Cardiovascular: Negative for chest pain and leg swelling  Gastrointestinal: Negative for constipation and diarrhea  Genitourinary: Positive for frequency  Musculoskeletal: Positive for arthralgias, back pain, joint swelling and neck pain  Negative for myalgias  Skin: Positive for rash  Negative for color change  Allergic/Immunologic: Positive for environmental allergies  Neurological: Positive for numbness  Negative for weakness  Hematological: Negative for adenopathy  Psychiatric/Behavioral: Negative for sleep disturbance         Home Medications:    Current Outpatient Medications:     albuterol (ACCUNEB) 1 25 MG/3ML nebulizer solution, Take 1 ampule by nebulization every 6 (six) hours as needed for wheezing, Disp: , Rfl:     atorvastatin (LIPITOR) 80 mg tablet, Take 40 mg by mouth, Disp: , Rfl:     Carboxymethylcellulose Sod PF 0 25 % SOLN, INSTILL ONE DROP BOTH EYES FOUR TIMES A DAY FOR DRY EYES, Disp: , Rfl:     Cholecalciferol 2000 units CAPS, Take 2,000 Units by mouth, Disp: , Rfl:     dextran 70-hypromellose (GENTEAL TEARS) 0 1-0 3 % ophthalmic solution, Apply 1 drop to eye, Disp: , Rfl:     ketotifen (ZADITOR) 0 025 % ophthalmic solution, INSTILL ONE DROP BOTH EYES TWICE A DAY, Disp: , Rfl:     leflunomide (ARAVA) 20 MG tablet, Take 1 tablet (20 mg total) by mouth daily, Disp: 90 tablet, Rfl: 1    nitroglycerin (NITROSTAT) 0 4 mg SL tablet, Place 0 4 mg under the tongue, Disp: , Rfl:     polyvinyl alcohol (LIQUIFILM TEARS) 1 4 % ophthalmic solution, Apply 1 drop to eye, Disp: , Rfl:     sertraline (ZOLOFT) 100 mg tablet, Take 100 mg by mouth daily , Disp: , Rfl:     sulfaSALAzine (AZULFIDINE) 500 mg tablet, Take 2 tabs twice a day, Disp: 360 tablet, Rfl: 1    aspirin (Aspirin 81) 81 mg chewable tablet, CHEW ONE TABLET BY MOUTH EVERY DAY, Disp: , Rfl:     diclofenac sodium (VOLTAREN) 1 %, Apply 2 g topically 4 (four) times a day as needed (pain) (Patient not taking: Reported on 3/15/2021), Disp: 100 g, Rfl: 3    omeprazole (PriLOSEC OTC) 20 MG tablet, Take 20 mg by mouth, Disp: , Rfl:     tamsulosin (FLOMAX) 0 4 mg, Take 0 4 mg by mouth, Disp: , Rfl:     triamcinolone (KENALOG) 0 1 % cream, Apply topically 2 (two) times a day, Disp: 80 g, Rfl: 3    Objective:    Vitals:    07/20/21 1018   BP: 123/75   BP Location: Left arm   Patient Position: Sitting   Cuff Size: Standard   Pulse: 72   Weight: 70 7 kg (155 lb 12 8 oz)   Height: 5' 10" (1 778 m)       Physical Exam  Constitutional:       General: He is not in acute distress  Appearance: He is well-developed  HENT:      Head: Normocephalic and atraumatic  Eyes:      General: Lids are normal  No scleral icterus  Conjunctiva/sclera: Conjunctivae normal    Cardiovascular:      Rate and Rhythm: Normal rate and regular rhythm  Heart sounds: S1 normal and S2 normal  No murmur heard  No friction rub  Pulmonary:      Effort: Pulmonary effort is normal  No tachypnea or respiratory distress  Breath sounds: Normal breath sounds  No wheezing, rhonchi or rales  Musculoskeletal:         General: Swelling and tenderness present  Cervical back: Neck supple  No muscular tenderness  Comments: Right 1st MCPs swollen/tender   Skin:     General: Skin is warm and dry  Findings: No rash  Nails: There is no clubbing  Comments: rash not present currently   Neurological:      Mental Status: He is alert  Sensory: No sensory deficit     Psychiatric:         Behavior: Behavior normal  Behavior is cooperative  Reviewed labs and imaging  Imaging:   Left Knee x-rays 3/23/21  There is a small joint effusion  Mild osteoarthritis with narrowing of the medial tibiofemoral joint and small osteophytes seen  [facility] Bilateral Hand Ultrasound 7/23/20  IMPRESSION:  There is evidence of inflammatory arthritis in the following joints:     Right 1st metacarpophalangeal joint: Mild synovial hypertrophy      Right 2nd metacarpophalangeal joint: Moderate synovial hypertrophy and mild synovial hyperemia      Right 2nd PIP joint: Mild synovial hypertrophy, moderate synovial hyperemia, small joint effusion      Right 3rd metacarpophalangeal joint: Mild synovial hypertrophy, severe synovial hyperemia      Left 2nd metacarpophalangeal joint: Mild synovial hypertrophy      The asymmetric, distal pattern is in favor of psoriatic arthritis  Right Hand x-rays 1/13/20  IMPRESSION:  Changes of arthritis most advanced at the 2nd DIP joint, and periarticular soft tissue swelling particularly the 2nd finger   Soft tissue swelling findings are suspicious for an inflammatory arthropathy      Labs:   Office Visit on 07/20/2021   Component Date Value Ref Range Status    WBC 07/20/2021 4 97  4 31 - 10 16 Thousand/uL Final    RBC 07/20/2021 4 41  3 88 - 5 62 Million/uL Final    Hemoglobin 07/20/2021 13 7  12 0 - 17 0 g/dL Final    Hematocrit 07/20/2021 43 1  36 5 - 49 3 % Final    MCV 07/20/2021 98  82 - 98 fL Final    MCH 07/20/2021 31 1  26 8 - 34 3 pg Final    MCHC 07/20/2021 31 8  31 4 - 37 4 g/dL Final    RDW 07/20/2021 12 1  11 6 - 15 1 % Final    MPV 07/20/2021 11 9  8 9 - 12 7 fL Final    Platelets 78/31/4760 172  149 - 390 Thousands/uL Final    nRBC 07/20/2021 0  /100 WBCs Final    Neutrophils Relative 07/20/2021 55  43 - 75 % Final    Immat GRANS % 07/20/2021 0  0 - 2 % Final    Lymphocytes Relative 07/20/2021 23  14 - 44 % Final    Monocytes Relative 07/20/2021 15* 4 - 12 % Final  Eosinophils Relative 07/20/2021 6  0 - 6 % Final    Basophils Relative 07/20/2021 1  0 - 1 % Final    Neutrophils Absolute 07/20/2021 2 71  1 85 - 7 62 Thousands/µL Final    Immature Grans Absolute 07/20/2021 0 02  0 00 - 0 20 Thousand/uL Final    Lymphocytes Absolute 07/20/2021 1 16  0 60 - 4 47 Thousands/µL Final    Monocytes Absolute 07/20/2021 0 74  0 17 - 1 22 Thousand/µL Final    Eosinophils Absolute 07/20/2021 0 30  0 00 - 0 61 Thousand/µL Final    Basophils Absolute 07/20/2021 0 04  0 00 - 0 10 Thousands/µL Final    Sodium 07/20/2021 141  136 - 145 mmol/L Final    Potassium 07/20/2021 3 6  3 5 - 5 3 mmol/L Final    Chloride 07/20/2021 107  100 - 108 mmol/L Final    CO2 07/20/2021 32  21 - 32 mmol/L Final    ANION GAP 07/20/2021 2* 4 - 13 mmol/L Final    BUN 07/20/2021 14  5 - 25 mg/dL Final    Creatinine 07/20/2021 0 83  0 60 - 1 30 mg/dL Final    Standardized to IDMS reference method    Glucose, Fasting 07/20/2021 87  65 - 99 mg/dL Final    Specimen collection should occur prior to Sulfasalazine administration due to the potential for falsely depressed results  Specimen collection should occur prior to Sulfapyridine administration due to the potential for falsely elevated results   Calcium 07/20/2021 9 5  8 3 - 10 1 mg/dL Final    Corrected Calcium 07/20/2021 10 0  8 3 - 10 1 mg/dL Final    AST 07/20/2021 33  5 - 45 U/L Final    Specimen collection should occur prior to Sulfasalazine administration due to the potential for falsely depressed results   ALT 07/20/2021 43  12 - 78 U/L Final    Specimen collection should occur prior to Sulfasalazine and/or Sulfapyridine administration due to the potential for falsely depressed results       Alkaline Phosphatase 07/20/2021 74  46 - 116 U/L Final    Total Protein 07/20/2021 6 7  6 4 - 8 2 g/dL Final    Albumin 07/20/2021 3 4* 3 5 - 5 0 g/dL Final    Total Bilirubin 07/20/2021 0 33  0 20 - 1 00 mg/dL Final    Use of this assay is not recommended for patients undergoing treatment with eltrombopag due to the potential for falsely elevated results   eGFR 07/20/2021 87  ml/min/1 73sq m Final    CRP 07/20/2021 <3 0  <3 0 mg/L Final    Sed Rate 07/20/2021 12  0 - 19 mm/hour Final   Appointment on 05/27/2021   Component Date Value Ref Range Status    Hemoglobin A1C 05/27/2021 5 6  Normal 3 8-5 6%; PreDiabetic 5 7-6 4%; Diabetic >=6 5%; Glycemic control for adults with diabetes <7 0% % Final    EAG 05/27/2021 114  mg/dl Final    Cholesterol 05/27/2021 195  50 - 200 mg/dL Final    Cholesterol:       Desirable         <200 mg/dl       Borderline         200-239 mg/dl       High              >239           Triglycerides 05/27/2021 102  <=150 mg/dL Final    Triglyceride:     Normal          <150 mg/dl     Borderline High 150-199 mg/dl     High            200-499 mg/dl        Very High       >499 mg/dl    Specimen collection should occur prior to N-Acetylcysteine or Metamizole administration due to the potential for falsely depressed results   HDL, Direct 05/27/2021 80  >=40 mg/dL Final    HDL Cholesterol:       Low     <41 mg/dL  Specimen collection should occur prior to Metamizole administration due to the potential for falsley depressed results   LDL Calculated 05/27/2021 95  0 - 100 mg/dL Final    LDL Cholesterol:     Optimal           <100 mg/dl     Near Optimal      100-129 mg/dl     Above Optimal       Borderline High 130-159 mg/dl       High            160-189 mg/dl       Very High       >189 mg/dl         This screening LDL is a calculated result  It does not have the accuracy of the Direct Measured LDL in the monitoring of patients with hyperlipidemia and/or statin therapy  Direct Measure LDL ([id number]) must be ordered separately in these patients      TSH 3RD GENERATON 05/27/2021 2 070  0 358 - 3 740 uIU/mL Final    PSA 05/27/2021 1 5  0 0 - 4 0 ng/mL Final    American Urological Association Guidelines define biochemical recurrence of prostate cancer as a detectable or rising PSA value post-radical prostatectomy that is greater than or equal to 0 2 ng/mL with a second confirmatory level of greater than or equal to 0 2 ng/mL      WBC 05/27/2021 5 34  4 31 - 10 16 Thousand/uL Final    RBC 05/27/2021 4 65  3 88 - 5 62 Million/uL Final    Hemoglobin 05/27/2021 14 8  12 0 - 17 0 g/dL Final    Hematocrit 05/27/2021 45 9  36 5 - 49 3 % Final    MCV 05/27/2021 99* 82 - 98 fL Final    MCH 05/27/2021 31 8  26 8 - 34 3 pg Final    MCHC 05/27/2021 32 2  31 4 - 37 4 g/dL Final    RDW 05/27/2021 12 5  11 6 - 15 1 % Final    MPV 05/27/2021 11 2  8 9 - 12 7 fL Final    Platelets 00/08/2703 212  149 - 390 Thousands/uL Final    nRBC 05/27/2021 0  /100 WBCs Final    Neutrophils Relative 05/27/2021 54  43 - 75 % Final    Immat GRANS % 05/27/2021 0  0 - 2 % Final    Lymphocytes Relative 05/27/2021 26  14 - 44 % Final    Monocytes Relative 05/27/2021 14* 4 - 12 % Final    Eosinophils Relative 05/27/2021 5  0 - 6 % Final    Basophils Relative 05/27/2021 1  0 - 1 % Final    Neutrophils Absolute 05/27/2021 2 87  1 85 - 7 62 Thousands/µL Final    Immature Grans Absolute 05/27/2021 0 02  0 00 - 0 20 Thousand/uL Final    Lymphocytes Absolute 05/27/2021 1 38  0 60 - 4 47 Thousands/µL Final    Monocytes Absolute 05/27/2021 0 73  0 17 - 1 22 Thousand/µL Final    Eosinophils Absolute 05/27/2021 0 28  0 00 - 0 61 Thousand/µL Final    Basophils Absolute 05/27/2021 0 06  0 00 - 0 10 Thousands/µL Final    CRP 05/27/2021 <3 0  <3 0 mg/L Final    Sodium 05/27/2021 138  136 - 145 mmol/L Final    Potassium 05/27/2021 4 2  3 5 - 5 3 mmol/L Final    Chloride 05/27/2021 104  100 - 108 mmol/L Final    CO2 05/27/2021 32  21 - 32 mmol/L Final    ANION GAP 05/27/2021 2* 4 - 13 mmol/L Final    BUN 05/27/2021 19  5 - 25 mg/dL Final    Creatinine 05/27/2021 0 82  0 60 - 1 30 mg/dL Final    Standardized to IDMS reference method    Glucose, Fasting 05/27/2021 96  65 - 99 mg/dL Final    Specimen collection should occur prior to Sulfasalazine administration due to the potential for falsely depressed results  Specimen collection should occur prior to Sulfapyridine administration due to the potential for falsely elevated results   Calcium 05/27/2021 9 9  8 3 - 10 1 mg/dL Final    AST 05/27/2021 35  5 - 45 U/L Final    Specimen collection should occur prior to Sulfasalazine administration due to the potential for falsely depressed results   ALT 05/27/2021 47  12 - 78 U/L Final    Specimen collection should occur prior to Sulfasalazine and/or Sulfapyridine administration due to the potential for falsely depressed results   Alkaline Phosphatase 05/27/2021 86  46 - 116 U/L Final    Total Protein 05/27/2021 7 1  6 4 - 8 2 g/dL Final    Albumin 05/27/2021 3 6  3 5 - 5 0 g/dL Final    Total Bilirubin 05/27/2021 0 56  0 20 - 1 00 mg/dL Final    Use of this assay is not recommended for patients undergoing treatment with eltrombopag due to the potential for falsely elevated results   eGFR 05/27/2021 88  ml/min/1 73sq m Final   Office Visit on 04/22/2021   Component Date Value Ref Range Status    QFT Nil 04/22/2021 0 75  0 - 8 0 IU/ml Final    QFT TB1-NIL 04/22/2021 >10 00  IU/ml Final    QFT TB2-NIL 04/22/2021 >10 00  IU/ml Final    QFT Mitogen-NIL 04/22/2021 >10 00  IU/ml Final    QFT Final Interpretation 04/22/2021 Positive* Negative Final    Interferon-gamma response to M  tuberculosis antigens detected suggesting infection with M  tuberculosis  Quantiferon TB Gold Plus is an indirect test for M  tuberculosis infection and is intended for use in conjunction with risk assessment, radiography and other medical and diagnostic evaluations   Positive results in patients at low risk for TB should be interpreted with caution and repeat testing on a new sample should be considered as recommended by 2017 AST/IDSA/CDC Clinical Practice Guideline for Diagnosis of Tuberculosis in Adults and [place]  False positive results may occur in patients with prior infection with M  marinum, M  szulgai or M  [place]ii      Hepatitis B Surface Ag 04/22/2021 Non-reactive  Non-reactive, NonReactive - Confirmed Final    Hepatitis C Ab 04/22/2021 Non-reactive  Non-reactive Final    Hep B C IgM 04/22/2021 Non-reactive  Non-reactive Final    Hep B Core Total Ab 04/22/2021 Non-reactive  Non-reactive Final    WBC 04/23/2021 5 23  4 31 - 10 16 Thousand/uL Final    RBC 04/23/2021 4 66  3 88 - 5 62 Million/uL Final    Hemoglobin 04/23/2021 14 7  12 0 - 17 0 g/dL Final    Hematocrit 04/23/2021 45 9  36 5 - 49 3 % Final    MCV 04/23/2021 99* 82 - 98 fL Final    MCH 04/23/2021 31 5  26 8 - 34 3 pg Final    MCHC 04/23/2021 32 0  31 4 - 37 4 g/dL Final    RDW 04/23/2021 12 9  11 6 - 15 1 % Final    MPV 04/23/2021 11 1  8 9 - 12 7 fL Final    Platelets 10/90/3210 233  149 - 390 Thousands/uL Final    nRBC 04/23/2021 0  /100 WBCs Final    Neutrophils Relative 04/23/2021 64  43 - 75 % Final    Immat GRANS % 04/23/2021 0  0 - 2 % Final    Lymphocytes Relative 04/23/2021 22  14 - 44 % Final    Monocytes Relative 04/23/2021 9  4 - 12 % Final    Eosinophils Relative 04/23/2021 4  0 - 6 % Final    Basophils Relative 04/23/2021 1  0 - 1 % Final    Neutrophils Absolute 04/23/2021 3 37  1 85 - 7 62 Thousands/µL Final    Immature Grans Absolute 04/23/2021 0 02  0 00 - 0 20 Thousand/uL Final    Lymphocytes Absolute 04/23/2021 1 13  0 60 - 4 47 Thousands/µL Final    Monocytes Absolute 04/23/2021 0 45  0 17 - 1 22 Thousand/µL Final    Eosinophils Absolute 04/23/2021 0 22  0 00 - 0 61 Thousand/µL Final    Basophils Absolute 04/23/2021 0 04  0 00 - 0 10 Thousands/µL Final    Sodium 04/23/2021 140  136 - 145 mmol/L Final    Potassium 04/23/2021 4 6  3 5 - 5 3 mmol/L Final    Chloride 04/23/2021 108  100 - 108 mmol/L Final    CO2 04/23/2021 31  21 - 32 mmol/L Final    ANION GAP 04/23/2021 1* 4 - 13 mmol/L Final    BUN 04/23/2021 16  5 - 25 mg/dL Final    Creatinine 04/23/2021 0 90  0 60 - 1 30 mg/dL Final    Standardized to IDMS reference method    Glucose 04/23/2021 150* 65 - 140 mg/dL Final    If the patient is fasting, the ADA then defines impaired fasting glucose as > 100 mg/dL and diabetes as > or equal to 123 mg/dL  Specimen collection should occur prior to Sulfasalazine administration due to the potential for falsely depressed results  Specimen collection should occur prior to Sulfapyridine administration due to the potential for falsely elevated results   Calcium 04/23/2021 9 6  8 3 - 10 1 mg/dL Final    AST 04/23/2021 29  5 - 45 U/L Final    Specimen collection should occur prior to Sulfasalazine administration due to the potential for falsely depressed results   ALT 04/23/2021 47  12 - 78 U/L Final    Specimen collection should occur prior to Sulfasalazine and/or Sulfapyridine administration due to the potential for falsely depressed results   Alkaline Phosphatase 04/23/2021 95  46 - 116 U/L Final    Total Protein 04/23/2021 7 2  6 4 - 8 2 g/dL Final    Albumin 04/23/2021 3 7  3 5 - 5 0 g/dL Final    Total Bilirubin 04/23/2021 0 36  0 20 - 1 00 mg/dL Final    Use of this assay is not recommended for patients undergoing treatment with eltrombopag due to the potential for falsely elevated results      eGFR 04/23/2021 85  ml/min/1 73sq m Final    CRP 04/23/2021 <3 0  <3 0 mg/L Final    Sed Rate 04/23/2021 8  0 - 19 mm/hour Final

## 2021-09-20 ENCOUNTER — OFFICE VISIT (OUTPATIENT)
Dept: FAMILY MEDICINE CLINIC | Facility: CLINIC | Age: 71
End: 2021-09-20
Payer: MEDICARE

## 2021-09-20 VITALS
HEIGHT: 70 IN | RESPIRATION RATE: 16 BRPM | TEMPERATURE: 96.8 F | WEIGHT: 149 LBS | SYSTOLIC BLOOD PRESSURE: 126 MMHG | HEART RATE: 82 BPM | OXYGEN SATURATION: 97 % | DIASTOLIC BLOOD PRESSURE: 74 MMHG | BODY MASS INDEX: 21.33 KG/M2

## 2021-09-20 DIAGNOSIS — F32.A DEPRESSIVE DISORDER: ICD-10-CM

## 2021-09-20 DIAGNOSIS — E78.2 MIXED HYPERLIPIDEMIA: ICD-10-CM

## 2021-09-20 DIAGNOSIS — I10 ESSENTIAL HYPERTENSION: ICD-10-CM

## 2021-09-20 DIAGNOSIS — R73.9 HYPERGLYCEMIA: ICD-10-CM

## 2021-09-20 DIAGNOSIS — Z12.5 PROSTATE CANCER SCREENING: ICD-10-CM

## 2021-09-20 DIAGNOSIS — Z00.00 HEALTHCARE MAINTENANCE: ICD-10-CM

## 2021-09-20 DIAGNOSIS — I47.1 PSVT (PAROXYSMAL SUPRAVENTRICULAR TACHYCARDIA) (HCC): ICD-10-CM

## 2021-09-20 DIAGNOSIS — Z23 ENCOUNTER FOR IMMUNIZATION: ICD-10-CM

## 2021-09-20 DIAGNOSIS — K21.9 GASTROESOPHAGEAL REFLUX DISEASE, UNSPECIFIED WHETHER ESOPHAGITIS PRESENT: Primary | ICD-10-CM

## 2021-09-20 PROCEDURE — 99214 OFFICE O/P EST MOD 30 MIN: CPT | Performed by: FAMILY MEDICINE

## 2021-09-20 PROCEDURE — G0008 ADMIN INFLUENZA VIRUS VAC: HCPCS

## 2021-09-20 PROCEDURE — 90662 IIV NO PRSV INCREASED AG IM: CPT

## 2021-09-20 PROCEDURE — 1123F ACP DISCUSS/DSCN MKR DOCD: CPT | Performed by: FAMILY MEDICINE

## 2021-09-20 PROCEDURE — G0439 PPPS, SUBSEQ VISIT: HCPCS | Performed by: FAMILY MEDICINE

## 2021-09-20 NOTE — PROGRESS NOTES
Assessment/Plan:  Gastroesophageal reflux disease    Well controlled without medications  Will continue to monitor  Continue with lifestyle changes    Essential hypertension    Well controlled without meds  Will continue to monitor  PSVT (paroxysmal supraventricular tachycardia) ([facility])    Stable  Asymptomatic  Continue follow-up with cardiologist     Mixed hyperlipidemia    Stable  Continue same  Will continue to monitor  Hyperglycemia    It was discussed about low carb diet  Will continue to monitor A1c  Depressive disorder    Stable  Continue same  Will continue to monitor  Healthcare maintenance    It was discussed about immunizations, diet, exercise and safety measures  Diagnoses and all orders for this visit:    Gastroesophageal reflux disease, unspecified whether esophagitis present    Essential hypertension  -     CBC and differential; Future  -     Comprehensive metabolic panel; Future  -     Lipid Panel with Direct LDL reflex; Future  -     TSH, 3rd generation with Free T4 reflex; Future    Mixed hyperlipidemia  -     CBC and differential; Future  -     Comprehensive metabolic panel; Future  -     Lipid Panel with Direct LDL reflex; Future  -     TSH, 3rd generation with Free T4 reflex; Future    Hyperglycemia  -     Hemoglobin A1C; Future    Prostate cancer screening  -     PSA, Total Screen; Future    Encounter for immunization  -     influenza vaccine, high-dose, PF 0 7 mL (FLUZONE HIGH-DOSE)    PSVT (paroxysmal supraventricular tachycardia) ([facility] [address] )    Depressive disorder    Healthcare maintenance          There are no Patient Instructions on file for this visit  Return in about 6 months (around 3/20/2022)  Subjective:      Patient ID: Brianna Benedict is a 66 y o  male  Chief Complaint   Patient presents with   [employer] [facility] Wellness Visit         He is here today for follow-up multiple medical problems  He has been taking his medications    Denies any side effects from his medications  He had his last blood work done in May and his cholesterol was well controlled  He had blood work done for his rheumatologist in July 2 came back within normal limit  The following portions of the patient's history were reviewed and updated as appropriate: allergies, current medications, past family history, past medical history, past social history, past surgical history and problem list     Review of Systems   Constitutional: Negative for chills and fever  HENT: Negative for trouble swallowing  Eyes: Negative for visual disturbance  Respiratory: Negative for cough and shortness of breath  Cardiovascular: Negative for chest pain and palpitations  Gastrointestinal: Negative for abdominal pain, blood in stool and vomiting  Endocrine: Negative for cold intolerance and heat intolerance  Genitourinary: Negative for difficulty urinating and dysuria  Musculoskeletal: Negative for gait problem  Skin: Negative for rash  Neurological: Negative for dizziness, syncope and headaches  Hematological: Negative for adenopathy  Psychiatric/Behavioral: Negative for behavioral problems           Current Outpatient Medications   Medication Sig Dispense Refill    albuterol (ACCUNEB) 1 25 MG/3ML nebulizer solution Take 1 ampule by nebulization every 6 (six) hours as needed for wheezing      atorvastatin (LIPITOR) 40 mg tablet Take 40 mg by mouth       Carboxymethylcellulose Sod PF 0 25 % SOLN INSTILL ONE DROP BOTH EYES FOUR TIMES A DAY FOR DRY EYES      Cholecalciferol 2000 units CAPS Take 2,000 Units by mouth      dextran 70-hypromellose (GENTEAL TEARS) 0 1-0 3 % ophthalmic solution Apply 1 drop to eye      ketotifen (ZADITOR) 0 025 % ophthalmic solution INSTILL ONE DROP BOTH EYES TWICE A DAY      leflunomide (ARAVA) 20 MG tablet Take 1 tablet (20 mg total) by mouth daily 90 tablet 1    polyvinyl alcohol (LIQUIFILM TEARS) 1 4 % ophthalmic solution Apply 1 drop to eye      sertraline (ZOLOFT) 100 mg tablet Take 100 mg by mouth daily       sulfaSALAzine (AZULFIDINE) 500 mg tablet Take 2 tabs twice a day 360 tablet 1    triamcinolone (KENALOG) 0 1 % cream Apply topically 2 (two) times a day 80 g 3     No current facility-administered medications for this visit  Objective:    /74 (BP Location: Left arm, Patient Position: Sitting, Cuff Size: Adult)   Pulse 82   Temp (!) 96 8 °F (36 °C) (Tympanic)   Resp 16   Ht 5' 10" (1 778 m)   Wt 67 6 kg (149 lb)   SpO2 97%   BMI 21 38 kg/m²        Physical Exam  Vitals and nursing note reviewed  Constitutional:       Appearance: Normal appearance  He is well-developed  HENT:      Head: Normocephalic and atraumatic  Eyes:      Pupils: Pupils are equal, round, and reactive to light  Cardiovascular:      Rate and Rhythm: Normal rate and regular rhythm  Heart sounds: Normal heart sounds  Pulmonary:      Effort: Pulmonary effort is normal       Breath sounds: Normal breath sounds  Abdominal:      General: Bowel sounds are normal       Palpations: Abdomen is soft  Musculoskeletal:         General: Normal range of motion  Cervical back: Normal range of motion and neck supple  Lymphadenopathy:      Cervical: No cervical adenopathy  Skin:     General: Skin is warm  Findings: No rash  Neurological:      Mental Status: He is alert and oriented to person, place, and time  Cranial Nerves: No cranial nerve deficit                  Ronna Wheat MD

## 2021-09-20 NOTE — PROGRESS NOTES
Assessment and Plan:     Problem List Items Addressed This Visit        Digestive    Gastroesophageal reflux disease - Primary       Well controlled without medications  Will continue to monitor  Continue with lifestyle changes            Cardiovascular and Mediastinum    PSVT (paroxysmal supraventricular tachycardia) (HCC)       Stable  Asymptomatic  Continue follow-up with cardiologist          Essential hypertension       Well controlled without meds  Will continue to monitor  Relevant Orders    CBC and differential    Comprehensive metabolic panel    Lipid Panel with Direct LDL reflex    TSH, 3rd generation with Free T4 reflex       Other    Mixed hyperlipidemia       Stable  Continue same  Will continue to monitor  Relevant Orders    CBC and differential    Comprehensive metabolic panel    Lipid Panel with Direct LDL reflex    TSH, 3rd generation with Free T4 reflex    Depressive disorder       Stable  Continue same  Will continue to monitor  Healthcare maintenance       It was discussed about immunizations, diet, exercise and safety measures  Hyperglycemia       It was discussed about low carb diet  Will continue to monitor A1c  Relevant Orders    Hemoglobin A1C      Other Visit Diagnoses     Prostate cancer screening        Relevant Orders    PSA, Total Screen    Encounter for immunization        Relevant Orders    influenza vaccine, high-dose, PF 0 7 mL (FLUZONE HIGH-DOSE) (Completed)          Falls Plan of Care: balance, strength, and gait training instructions were provided  Preventive health issues were discussed with patient, and age appropriate screening tests were ordered as noted in patient's After Visit Summary  Personalized health advice and appropriate referrals for health education or preventive services given if needed, as noted in patient's After Visit Summary       History of Present Illness:     Patient presents for Medicare Annual Wellness visit    Patient Care Team:  Linden Weir MD as PCP - General (Family Medicine)     Problem List:     Patient Active Problem List   Diagnosis    PSVT (paroxysmal supraventricular tachycardia) ([address] )    ED (erectile dysfunction) of organic origin    Hypercalcemia    Lipid disorder    Nephrolithiasis    Osteopenia of multiple sites    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    Inflammatory arthritis    Preop examination    Vitamin D deficiency    Refractive amblyopia    Retinal dot hemorrhage    Presbyopia    Polyp of colon    Nonexudative senile macular degeneration of retina    Macular drusen    Insomnia    Essential hypertension    Mixed hyperlipidemia    Ganglion    Depressive disorder    Combined form of senile cataract    Chronic bronchitis (HCC)    Asthma    Allergic rhinitis    Allergic conjunctivitis    Alcohol abuse    Suicidal ideation    Seborrheic dermatitis    Bilateral carotid artery stenosis    Gastroesophageal reflux disease    Benign prostatic hyperplasia with weak urinary stream    History of hyperparathyroidism    Acute pain of right shoulder    Healthcare maintenance    Psoriasis    Bilateral impacted cerumen    Psoriatic arthritis (HCC)    Hyperglycemia    Chronic pain of right knee      Past Medical and Surgical History:     Past Medical History:   Diagnosis Date    Gout     Hypertension     Kidney stone     Psoriatic arthritis (HCC)     PTSD (post-traumatic stress disorder)     SVT (supraventricular tachycardia) (HCC)     Thyroid tumor, benign      Past Surgical History:   Procedure Laterality Date    CARDIAC ELECTROPHYSIOLOGY MAPPING AND ABLATION      SVT    PARATHYROIDECTOMY        Family History:     Family History   Problem Relation Age of Onset    Coronary artery disease Brother     Hypertension Mother       Social History:     Social History     Socioeconomic History    Marital status:  Spouse name: None    Number of children: None    Years of education: None    Highest education level: None   Occupational History    None   Tobacco Use    Smoking status: Never Smoker    Smokeless tobacco: Never Used   Vaping Use    Vaping Use: Never used   Substance and Sexual Activity    Alcohol use: No    Drug use: No    Sexual activity: None   Other Topics Concern    None   Social History Narrative    None     Social Determinants of Health     Financial Resource Strain:     Difficulty of Paying Living Expenses:    Food Insecurity:     Worried About Running Out of Food in the Last Year:     Ran Out of Food in the Last Year:    Transportation Needs:     Lack of Transportation (Medical):      Lack of Transportation (Non-Medical):    Physical Activity:     Days of Exercise per Week:     Minutes of Exercise per Session:    Stress:     Feeling of Stress :    Social Connections:     Frequency of Communication with Friends and Family:     Frequency of Social Gatherings with Friends and Family:     Attends Buddhism Services:     Active Member of Clubs or Organizations:     Attends Club or Organization Meetings:     Marital Status:    Intimate Partner Violence:     Fear of Current or Ex-Partner:     Emotionally Abused:     Physically Abused:     Sexually Abused:       Medications and Allergies:     Current Outpatient Medications   Medication Sig Dispense Refill    albuterol (ACCUNEB) 1 25 MG/3ML nebulizer solution Take 1 ampule by nebulization every 6 (six) hours as needed for wheezing      atorvastatin (LIPITOR) 40 mg tablet Take 40 mg by mouth       Carboxymethylcellulose Sod PF 0 25 % SOLN INSTILL ONE DROP BOTH EYES FOUR TIMES A DAY FOR DRY EYES      Cholecalciferol 2000 units CAPS Take 2,000 Units by mouth      dextran 70-hypromellose (GENTEAL TEARS) 0 1-0 3 % ophthalmic solution Apply 1 drop to eye      ketotifen (ZADITOR) 0 025 % ophthalmic solution INSTILL ONE DROP BOTH EYES TWICE A DAY      leflunomide (ARAVA) 20 MG tablet Take 1 tablet (20 mg total) by mouth daily 90 tablet 1    polyvinyl alcohol (LIQUIFILM TEARS) 1 4 % ophthalmic solution Apply 1 drop to eye      sertraline (ZOLOFT) 100 mg tablet Take 100 mg by mouth daily       sulfaSALAzine (AZULFIDINE) 500 mg tablet Take 2 tabs twice a day 360 tablet 1    triamcinolone (KENALOG) 0 1 % cream Apply topically 2 (two) times a day 80 g 3     No current facility-administered medications for this visit       Allergies   Allergen Reactions    Cefadroxil Other (See Comments)     bleeding internally    Midazolam Other (See Comments)     ARRHYTHMIA    Prazosin     Fentanyl Palpitations      Immunizations:     Immunization History   Administered Date(s) Administered    INFLUENZA 11/27/2006, 11/05/2007, 10/20/2008, 09/22/2009, 10/29/2010, 10/26/2011, 10/09/2012, 02/05/2013, 09/30/2013, 10/15/2014, 10/20/2016    Influenza Quadrivalent Preservative Free 3 years and older IM 10/01/2018, 10/21/2019, 10/15/2020    Influenza, high dose seasonal 0 7 mL 09/20/2021    Influenza, injectable, quadrivalent, preservative free 0 5 mL 10/01/2018, 10/21/2019, 10/15/2020    Influenza, seasonal, injectable, preservative free 10/20/2015, 10/19/2016, 10/11/2017    Pneumococcal 11/05/2007, 11/03/2015    Pneumococcal Conjugate 13-Valent 01/19/2016    Pneumococcal Conjugate PCV 7 11/03/2015    Pneumococcal Polysaccharide PPV23 09/05/2017    SARS-CoV-2 / COVID-19 02/04/2021, 03/04/2021    Td (adult), Unspecified 09/18/2008    Tdap 06/02/2014, 05/31/2020    Zoster 11/03/2013, 03/31/2015    Zoster Vaccine Recombinant 08/07/2020      Health Maintenance:         Topic Date Due    Colorectal Cancer Screening  06/06/2022    Hepatitis C Screening  Completed         Topic Date Due    COVID-19 Vaccine (3 - Inadvertent mRNA risk 3-dose series) 04/01/2021      Medicare Health Risk Assessment:     /74 (BP Location: Left arm, Patient Position: Sitting, Cuff Size: Adult)   Pulse 82   Temp (!) 96 8 °F (36 °C) (Tympanic)   Resp 16   Ht 5' 10" (1 778 m)   Wt 67 6 kg (149 lb)   SpO2 97%   BMI 21 38 kg/m²      Butler Lanes is here for his Subsequent Wellness visit  Last Medicare Wellness visit information reviewed, patient interviewed and updates made to the record today  Health Risk Assessment:   Patient rates overall health as good  Patient feels that their physical health rating is same  Patient is satisfied with their life  Eyesight was rated as slightly worse  Hearing was rated as same  Patient feels that their emotional and mental health rating is same  Patients states they are sometimes angry  Patient states they are sometimes unusually tired/fatigued  Pain experienced in the last 7 days has been some  Patient's pain rating has been 6/10  Patient states that he has experienced no weight loss or gain in last 6 months  Depression Screening:   PHQ-2 Score: 0  PHQ-9 Score: 0      Fall Risk Screening: In the past year, patient has experienced: no history of falling in past year      Home Safety:  Patient does not have trouble with stairs inside or outside of their home  Patient has working smoke alarms and has no working carbon monoxide detector  Home safety hazards include: none  Nutrition:   Current diet is Regular  Medications:   Patient is currently taking over-the-counter supplements  OTC medications include: see medication list  Patient is able to manage medications  Activities of Daily Living (ADLs)/Instrumental Activities of Daily Living (IADLs):   Walk and transfer into and out of bed and chair?: Yes  Dress and groom yourself?: Yes    Bathe or shower yourself?: Yes    Feed yourself?  Yes  Do your laundry/housekeeping?: Yes  Manage your money, pay your bills and track your expenses?: Yes  Make your own meals?: Yes    Do your own shopping?: Yes    Previous Hospitalizations:   Any hospitalizations or ED visits within the last 12 months?: Yes    How many hospitalizations have you had in the last year?: 1-2    Advance Care Planning:   Living will: Yes    Durable POA for healthcare: Yes    Advanced directive: Yes      Cognitive Screening:   Provider or family/friend/caregiver concerned regarding cognition?: No    PREVENTIVE SCREENINGS      Cardiovascular Screening:    General: Screening Not Indicated and History Lipid Disorder      Diabetes Screening:     General: Screening Current      Colorectal Cancer Screening:     General: Screening Current      Prostate Cancer Screening:    General: Screening Current      Osteoporosis Screening:    General: Risks and Benefits Discussed      Abdominal Aortic Aneurysm (AAA) Screening:    Risk factors include: age between 73-67 yo        General: Risks and Benefits Discussed      Lung Cancer Screening:     General: Screening Not Indicated      Hepatitis C Screening:    General: Screening Current    Screening, Brief Intervention, and Referral to Treatment (SBIRT)    Screening  Typical number of drinks in a day: 0  Typical number of drinks in a week: 0  Interpretation: Low risk drinking behavior  Single Item Drug Screening:  How often have you used an illegal drug (including marijuana) or a prescription medication for non-medical reasons in the past year? never    Single Item Drug Screen Score: 0  Interpretation: Negative screen for possible drug use disorder    Brief Intervention  Alcohol & drug use screenings were reviewed  No concerns regarding substance use disorder identified         Cornelio Gambino MD

## 2021-12-04 ENCOUNTER — OFFICE VISIT (OUTPATIENT)
Dept: URGENT CARE | Age: 71
End: 2021-12-04
Payer: MEDICARE

## 2021-12-04 VITALS
BODY MASS INDEX: 21.47 KG/M2 | RESPIRATION RATE: 18 BRPM | OXYGEN SATURATION: 100 % | TEMPERATURE: 97 F | HEART RATE: 80 BPM | WEIGHT: 150 LBS | HEIGHT: 70 IN

## 2021-12-04 DIAGNOSIS — B34.9 VIRAL INFECTION: Primary | ICD-10-CM

## 2021-12-04 PROCEDURE — U0003 INFECTIOUS AGENT DETECTION BY NUCLEIC ACID (DNA OR RNA); SEVERE ACUTE RESPIRATORY SYNDROME CORONAVIRUS 2 (SARS-COV-2) (CORONAVIRUS DISEASE [COVID-19]), AMPLIFIED PROBE TECHNIQUE, MAKING USE OF HIGH THROUGHPUT TECHNOLOGIES AS DESCRIBED BY CMS-2020-01-R: HCPCS | Performed by: PHYSICIAN ASSISTANT

## 2021-12-04 PROCEDURE — G0463 HOSPITAL OUTPT CLINIC VISIT: HCPCS | Performed by: PHYSICIAN ASSISTANT

## 2021-12-04 PROCEDURE — 99213 OFFICE O/P EST LOW 20 MIN: CPT | Performed by: PHYSICIAN ASSISTANT

## 2021-12-04 PROCEDURE — U0005 INFEC AGEN DETEC AMPLI PROBE: HCPCS | Performed by: PHYSICIAN ASSISTANT

## 2021-12-04 RX ORDER — CETIRIZINE HYDROCHLORIDE 10 MG/1
10 TABLET ORAL DAILY
Qty: 30 TABLET | Refills: 0 | Status: SHIPPED | OUTPATIENT
Start: 2021-12-04

## 2021-12-04 RX ORDER — BROMPHENIRAMINE MALEATE, PSEUDOEPHEDRINE HYDROCHLORIDE, AND DEXTROMETHORPHAN HYDROBROMIDE 2; 30; 10 MG/5ML; MG/5ML; MG/5ML
10 SYRUP ORAL 4 TIMES DAILY PRN
Qty: 120 ML | Refills: 0 | Status: SHIPPED | OUTPATIENT
Start: 2021-12-04 | End: 2022-03-22

## 2021-12-04 RX ORDER — ALBUTEROL SULFATE 90 UG/1
2 AEROSOL, METERED RESPIRATORY (INHALATION) EVERY 6 HOURS PRN
Qty: 8 G | Refills: 0 | Status: SHIPPED | OUTPATIENT
Start: 2021-12-04

## 2021-12-04 RX ORDER — AZELASTINE 1 MG/ML
1 SPRAY, METERED NASAL 2 TIMES DAILY
Qty: 1 ML | Refills: 0 | Status: SHIPPED | OUTPATIENT
Start: 2021-12-04

## 2021-12-05 ENCOUNTER — TELEPHONE (OUTPATIENT)
Dept: URGENT CARE | Age: 71
End: 2021-12-05

## 2021-12-05 LAB — SARS-COV-2 RNA RESP QL NAA+PROBE: NEGATIVE

## 2021-12-07 ENCOUNTER — TELEPHONE (OUTPATIENT)
Dept: URGENT CARE | Age: 71
End: 2021-12-07

## 2022-01-18 ENCOUNTER — OFFICE VISIT (OUTPATIENT)
Dept: RHEUMATOLOGY | Facility: CLINIC | Age: 72
End: 2022-01-18
Payer: MEDICARE

## 2022-01-18 VITALS
DIASTOLIC BLOOD PRESSURE: 71 MMHG | WEIGHT: 150 LBS | HEART RATE: 73 BPM | BODY MASS INDEX: 21.52 KG/M2 | SYSTOLIC BLOOD PRESSURE: 137 MMHG

## 2022-01-18 DIAGNOSIS — Z79.899 HIGH RISK MEDICATION USE: ICD-10-CM

## 2022-01-18 DIAGNOSIS — L40.50 PSORIATIC ARTHRITIS (HCC): Primary | ICD-10-CM

## 2022-01-18 DIAGNOSIS — Z13.820 OSTEOPOROSIS SCREENING: ICD-10-CM

## 2022-01-18 DIAGNOSIS — L40.9 PSORIASIS: ICD-10-CM

## 2022-01-18 DIAGNOSIS — M81.8 OTHER OSTEOPOROSIS WITHOUT CURRENT PATHOLOGICAL FRACTURE: ICD-10-CM

## 2022-01-18 DIAGNOSIS — E21.3 HYPERPARATHYROIDISM (HCC): ICD-10-CM

## 2022-01-18 PROCEDURE — 99215 OFFICE O/P EST HI 40 MIN: CPT | Performed by: INTERNAL MEDICINE

## 2022-01-18 RX ORDER — SODIUM FLUORIDE 5 MG/G
GEL, DENTIFRICE DENTAL
COMMUNITY
Start: 2021-10-18

## 2022-01-18 RX ORDER — TRIAMCINOLONE ACETONIDE 1 MG/G
CREAM TOPICAL 2 TIMES DAILY
Qty: 80 G | Refills: 3 | Status: SHIPPED | OUTPATIENT
Start: 2022-01-18 | End: 2022-07-18 | Stop reason: SDUPTHER

## 2022-01-18 RX ORDER — LEFLUNOMIDE 20 MG/1
20 TABLET ORAL DAILY
Qty: 90 TABLET | Refills: 1 | Status: SHIPPED | OUTPATIENT
Start: 2022-01-18 | End: 2022-07-18 | Stop reason: SDUPTHER

## 2022-01-18 RX ORDER — SULFASALAZINE 500 MG/1
TABLET ORAL
Qty: 360 TABLET | Refills: 1 | Status: SHIPPED | OUTPATIENT
Start: 2022-01-18 | End: 2022-07-18 | Stop reason: SDUPTHER

## 2022-01-18 NOTE — PATIENT INSTRUCTIONS
Prescriptions sent to [address] in [place] - Fax [phone]  Continue leflunomide 20mg daily  Continue sulfasalazine 2 tabs twice a day  Continue triamcinolone cream as needed for psoriasis   Do labs today  Schedule DEXA scan    Return to clinic in 6 months    Psoriatic Arthritis in Adults    What is psoriatic arthritis? -- Psoriatic arthritis is a condition that causes joint pain, swelling, and stiffness  It happens in people who have a long-term skin condition called psoriasis  People with psoriasis have patches of thick, red skin that are often covered by silver or white scales  Doctors don't know what causes psoriasis or psoriatic arthritis  What are the symptoms of psoriatic arthritis? -- Psoriatic arthritis causes pain, stiffness, and swelling in the affected joints  It can also affect the spine in some people  Because of the joint and spine problems, people can have trouble moving their body  Stiffness in the joints or low back is usually worse in the morning and lasts 30 minutes or longer  It usually gets better with exercise  Psoriatic arthritis can affect joints on one or both sides of the body  It usually affects more than one joint  In addition to joint symptoms (and the skin symptoms of psoriasis), people sometimes have other symptoms  These can include:  ? Swelling of a finger or toe, or the hands or feet  ? Swelling and pain in the back of the ankle or in the heel   ? Nail symptoms - The nails can look "pitted," as if they were pricked by a pin  The nail can also come up off the nail bed  ? Eye pain or redness  Is there a test for psoriatic arthritis? -- Yes  Your doctor or nurse will ask about your symptoms and do an exam  He or she will order X-rays of your painful joints  He or she might order an imaging test called an MRI  Imaging tests create pictures of the inside of the body    To check that another condition isn't causing your symptoms, your doctor or nurse might also order:  ?Blood tests  ? Lab tests on a sample of fluid from a swollen joint - To get a sample of fluid, the doctor will put a thin needle in your joint  How is psoriatic arthritis treated? -- There is no cure for psoriatic arthritis, but different treatments can help ease and control symptoms  Treatment for joint symptoms usually involves one or more of the following:  ?Medicines called nonsteroidal antiinflammatory drugs, or "NSAIDs" for short - Examples of NSAIDs are aspirin, ibuprofen (sample brand names: Advil, Motrin), and naproxen (sample brand name: Aleve)  ? Medicines that are usually used to treat other types of arthritis - Some of these include methotrexate and leflunomide  ? Medicines that block a substance called tumor necrosis factor, or "TNF" for short - TNF plays a role in psoriasis and psoriatic arthritis  Medicines that block TNF are called "anti-TNF" medicines  Examples include etanercept (brand name: Enbrel) and adalimumab (brand name: Humira)  ?Other medicines - If the options above don't help, your doctor might suggest trying a different medicine  Examples include ustekinumab (brand name: Nico Rebollar), secukinumab (brand name: Cosentyx), tofacitinib (brand name: Alexander Gross), abatacept (brand name: Jessica Castellanos), and apremilast (brand name: Adrian Daniels)  ? Shots of medicines called steroids that go into the painful joint - These are not the same as the steroids some athletes take illegally  These steroids help reduce swelling and pain  ? Heat - Heat, especially in the morning, can help reduce pain and stiffness  Do not use heat for longer than 20 minutes at a time  Also, do not use anything too hot that could burn your skin  ? Physical and occupational therapy - This involves learning exercises, movements, and ways of doing everyday tasks  ? Special shoe inserts (called "orthotics") - These can help keep your feet, ankles, and knees in the proper position  ?Treatment for psoriatic arthritis is usually long term   That's because even after symptoms get better, they sometimes return later on  Is there anything I can do on my own to feel better? -- Yes  It is very important that you stay active  You might want to avoid being active because you are in pain  But this can make things worse  It can make your muscles weak and your joints stiffer than they already are  Your doctor, nurse, or physical therapist can help you figure out which activities and exercises are right for you

## 2022-01-18 NOTE — PROGRESS NOTES
Assessment and Plan: Megha Chapman is a 65 y o   male who presents for follow-up of his psoriatic arthritis  Patient has a little of rash on his chloé, nasolabial folds which is less prominent then before  Patient says Kenalog cream works very well for him  He has minimal pain in the right thumb also has minimal knee pain bilaterally  Patient has no complains  Patient reports has no problems taking his leflunomide and sulfasalazine  Patient has no recent lab work done  His leflunomide dose was increased last time to 20 mg daily  He continued on sulfasalazine 1000 mg bid  He switched from clobetazole to triamcinolone cream which has a very good effect  There is a plan to start patient Sangita Lainez, but this was previously declined by insurance  - followCBC, CMP , CRP, ESR  - follow DEXA scan  - Return to clinic in 6 months  - [facility] approval    Plan:   Diagnoses and all orders for this visit:    Psoriatic arthritis ([facility] [address] )  -     CBC and differential  -     Comprehensive metabolic panel  -     C-reactive protein  -     Sedimentation rate, automated  -     sulfaSALAzine (AZULFIDINE) 500 mg tablet; Take 2 tabs twice a day  -     leflunomide (ARAVA) 20 MG tablet; Take 1 tablet (20 mg total) by mouth daily    High risk medication use  -     CBC and differential  -     Comprehensive metabolic panel    Hyperparathyroidism (HCC)    Psoriasis  -     triamcinolone (KENALOG) 0 1 % cream; Apply topically 2 (two) times a day    Osteoporosis screening  -     DXA bone density spine hip and pelvis; Future    Other osteoporosis without current pathological fracture   -     DXA bone density spine hip and pelvis;  Future    Other orders  -     SODIUM FLUORIDE, DENTAL GEL, 1 1 % GEL; APPLY SMALL AMOUNT TO BY MOUTH DAILY         High risk medication use - Benefits and risks of leflunomide use, including but not limited to gastrointestinal disturbances such as nausea, diarrhea, stomatitis, hair loss, fatigue, leukopenia, hepatotoxicity were discussed with the patient  CBC, CMP will be monitored regularly  Follow-up plan: Return to clinic in 6 months        Rheumatic Disease Summary  Leandrew Gilford a 66 y  o  male who originally presented on 4/2/20 as a Rheumatology consult referred by his Diana Mendes MD for evaluation of possible inflammatory arthritis involving the hands  Patient's clinical picture of history of asymmetric painful and swollen joints of hands, and psoriasis seemed consistent with psoriatic arthritis  His DAS28 score at initial visit was 3 32, consistent with moderate inflammatory disease activity  However, since patient's arthritis symptoms were not bothering him at the time, decided to conservatively approach treatment with diclofenac gel to apply to painful joints as needed  For his psoriasis, mainly involving his head, he can continue his current steroid ointment as needed  Ordered inflammatory markers and HLA-B27 for psoriatic arthritis work-up; HLA-B27 returned negative but his ESR was slightly elevated  He next presented on 7/14/20 for follow-up of likely psoriatic arthritis  Ordered [facility] hand ultrasound to evaluate for extent of inflammatory arthritis changes, which showed significant psoriatic arthritis changes  Continued diclofenac gel prn for joint pain, and Kenalog ointment prn for psoriasis  Started him on methotrexate 10mg po weekly with daily folic acid  He followed up in 12/2020 with Dr Sharlene Israel, who increased his methotrexate to 15mg po weekly with daily folic acid  4/22/21: Psoriatic arthritis is not controlled  Patient self-discontinued methotrexate 2-3 weeks ago, since it was not helping his psoriasis or arthritis symptoms  Prescribed clobetasol cream for him to apply to his psoriasis as needed  Reminded patient to schedule dermatology appointment    To address his psoriatic arthritis symptoms, initiated patient on sulfasalazine to be ultimately increased to the therapeutic dose of 1,000 mg p o  b i d   Patient will also benefit from the addition of Camelia Davalos for management of both his psoriasis and psoriatic arthritis; worked on prior [place] but it was denied  7/20/21:   Do labs now and before next clinic visit  Stop clobetasol cream, can use triamcinolone cream as needed for psoriasis  Continue sulfasalazine 2 tabs twice a day  Increase leflunomide to 20mg daily     YADI Jeffrey is a 66 y o   male who presents for follow-up of his psoriatic arthritis  Last clinic visit was 7/20/2021  He has hx of psoriasis since young age  Psoriatic arthritis diagnosed in 2020  Patient was initially on Metotrexate but this was not helping with joint pain, patient self discontinued  Paitent was then started on Leflunomide and sulfasalazine  His dose of leflunomide was increased  to 20 mg daily last visit and he continues taking sulfasalazine 1000mg twice a day  Patient reports his joint pain is under control  He has minimal pain in his knees bilaterally  His wrist movement is not restricted  There are deformities of PIP and DIP joints noted in his wrists b/l  His right thumb MCP joint is significantly enlarged  There is no tenderness or warmth noted on physical exam  Thee is minimal scaly rash noted on his face and chloé  The following portions of the patient's history were reviewed and updated as appropriate: allergies, current medications, past family history, past medical history, past social history, past surgical history and problem list     Review of Systems:   Review of Systems   Constitutional: Negative for activity change, appetite change, chills, fatigue, fever and unexpected weight change  HENT: Negative  Hay fever , dry allergies   Eyes: Negative  Dry eyes b/l   Respiratory: Positive for cough and wheezing  Negative for chest tightness and shortness of breath  Cardiovascular: Negative for chest pain, palpitations and leg swelling  Gastrointestinal: Negative for constipation and diarrhea  Endocrine: Negative for cold intolerance  Genitourinary: Positive for frequency  Negative for difficulty urinating and dysuria  Musculoskeletal: Positive for arthralgias and joint swelling  Negative for back pain, gait problem, myalgias and neck pain  Skin: Positive for rash  Negative for pallor  itching   Neurological: Positive for numbness  Negative for weakness and light-headedness  Hematological: Negative for adenopathy  Psychiatric/Behavioral: Negative          Home Medications:    Current Outpatient Medications:     albuterol (ACCUNEB) 1 25 MG/3ML nebulizer solution, Take 1 ampule by nebulization every 6 (six) hours as needed for wheezing, Disp: , Rfl:     albuterol (PROVENTIL HFA,VENTOLIN HFA) 90 mcg/act inhaler, Inhale 2 puffs every 6 (six) hours as needed for wheezing or shortness of breath, Disp: 8 g, Rfl: 0    atorvastatin (LIPITOR) 40 mg tablet, Take 40 mg by mouth , Disp: , Rfl:     azelastine (ASTELIN) 0 1 % nasal spray, 1 spray into each nostril 2 (two) times a day Use in each nostril as directed, Disp: 1 mL, Rfl: 0    Carboxymethylcellulose Sod PF 0 25 % SOLN, INSTILL ONE DROP BOTH EYES FOUR TIMES A DAY FOR DRY EYES, Disp: , Rfl:     dextran 70-hypromellose (GENTEAL TEARS) 0 1-0 3 % ophthalmic solution, Apply 1 drop to eye, Disp: , Rfl:     ketotifen (ZADITOR) 0 025 % ophthalmic solution, INSTILL ONE DROP BOTH EYES TWICE A DAY, Disp: , Rfl:     leflunomide (ARAVA) 20 MG tablet, Take 1 tablet (20 mg total) by mouth daily, Disp: 90 tablet, Rfl: 1    polyvinyl alcohol (LIQUIFILM TEARS) 1 4 % ophthalmic solution, Apply 1 drop to eye, Disp: , Rfl:     sertraline (ZOLOFT) 100 mg tablet, Take 100 mg by mouth daily , Disp: , Rfl:     SODIUM FLUORIDE, DENTAL GEL, 1 1 % GEL, APPLY SMALL AMOUNT TO BY MOUTH DAILY, Disp: , Rfl:     sulfaSALAzine (AZULFIDINE) 500 mg tablet, Take 2 tabs twice a day, Disp: 360 tablet, Rfl: 1   triamcinolone (KENALOG) 0 1 % cream, Apply topically 2 (two) times a day, Disp: 80 g, Rfl: 3    brompheniramine-pseudoephedrine-DM 30-2-10 MG/5ML syrup, Take 10 mL by mouth 4 (four) times a day as needed for congestion, cough or allergies (Patient not taking: Reported on 1/18/2022 ), Disp: 120 mL, Rfl: 0    cetirizine (ZyrTEC) 10 mg tablet, Take 1 tablet (10 mg total) by mouth daily (Patient not taking: Reported on 1/18/2022 ), Disp: 30 tablet, Rfl: 0    Cholecalciferol 2000 units CAPS, Take 2,000 Units by mouth, Disp: , Rfl:     Objective:    Vitals:    01/18/22 0923   BP: 137/71   Pulse: 73   Weight: 68 kg (150 lb)       Physical Exam  Constitutional:       General: He is not in acute distress  Appearance: He is well-developed  He is not ill-appearing  HENT:      Head: Normocephalic and atraumatic  Right Ear: External ear normal       Left Ear: External ear normal    Eyes:      Pupils: Pupils are equal, round, and reactive to light  Neck:      Thyroid: No thyromegaly  Vascular: No JVD  Cardiovascular:      Rate and Rhythm: Normal rate and regular rhythm  Heart sounds: Normal heart sounds  No murmur heard  No friction rub  No gallop  Pulmonary:      Effort: Pulmonary effort is normal       Breath sounds: Normal breath sounds  No wheezing or rales  Abdominal:      General: Bowel sounds are normal  There is no distension  Palpations: Abdomen is soft  Tenderness: There is no abdominal tenderness  There is no guarding  Musculoskeletal:         General: No tenderness  Normal range of motion  Comments: Left 2,3,4 PIP synovial prominence, 2,3,4 DIP synovial prominence  Right 1st digit PIP synovial prominence, 2,3 PIP synovial prominence, 2 DIP synovial prominence  Skin:     General: Skin is warm  Findings: No erythema or rash  Neurological:      Mental Status: He is alert and oriented to person, place, and time  Cranial Nerves: No cranial nerve deficit  Reviewed labs and imaging  Imaging:     CXR 4/28/21  No acute cardiopulmonary disease  No radiographic evidence of active TB  Left Knee x-rays 3/23/21  There is a small joint effusion  Mild osteoarthritis with narrowing of the medial tibiofemoral joint and small osteophytes seen  [facility] Bilateral Hand Ultrasound 7/23/20  IMPRESSION:  There is evidence of inflammatory arthritis in the following joints:  Right 1st metacarpophalangeal joint: Mild synovial hypertrophy  Right 2nd metacarpophalangeal joint: Moderate synovial hypertrophy and mild synovial hyperemia  Right 2nd PIP joint: Mild synovial hypertrophy, moderate synovial hyperemia, small joint effusion  Right 3rd metacarpophalangeal joint: Mild synovial hypertrophy, severe synovial hyperemia  Left 2nd metacarpophalangeal joint: Mild synovial hypertrophy  The asymmetric, distal pattern is in favor of psoriatic arthritis  Right Hand x-rays 1/13/20  IMPRESSION:  Changes of arthritis most advanced at the 2nd DIP joint, and periarticular soft tissue swelling particularly the 2nd finger   Soft tissue swelling findings are suspicious for an inflammatory arthropathy      Labs:   Office Visit on 12/04/2021   Component Date Value Ref Range Status    SARS-CoV-2 12/04/2021 Negative  Negative Final   Office Visit on 07/20/2021   Component Date Value Ref Range Status    WBC 07/20/2021 4 97  4 31 - 10 16 Thousand/uL Final    RBC 07/20/2021 4 41  3 88 - 5 62 Million/uL Final    Hemoglobin 07/20/2021 13 7  12 0 - 17 0 g/dL Final    Hematocrit 07/20/2021 43 1  36 5 - 49 3 % Final    MCV 07/20/2021 98  82 - 98 fL Final    MCH 07/20/2021 31 1  26 8 - 34 3 pg Final    MCHC 07/20/2021 31 8  31 4 - 37 4 g/dL Final    RDW 07/20/2021 12 1  11 6 - 15 1 % Final    MPV 07/20/2021 11 9  8 9 - 12 7 fL Final    Platelets 61/65/1820 172  149 - 390 Thousands/uL Final    nRBC 07/20/2021 0  /100 WBCs Final    Neutrophils Relative 07/20/2021 55  43 - 75 % Final    Immat GRANS % 07/20/2021 0  0 - 2 % Final    Lymphocytes Relative 07/20/2021 23  14 - 44 % Final    Monocytes Relative 07/20/2021 15* 4 - 12 % Final    Eosinophils Relative 07/20/2021 6  0 - 6 % Final    Basophils Relative 07/20/2021 1  0 - 1 % Final    Neutrophils Absolute 07/20/2021 2 71  1 85 - 7 62 Thousands/µL Final    Immature Grans Absolute 07/20/2021 0 02  0 00 - 0 20 Thousand/uL Final    Lymphocytes Absolute 07/20/2021 1 16  0 60 - 4 47 Thousands/µL Final    Monocytes Absolute 07/20/2021 0 74  0 17 - 1 22 Thousand/µL Final    Eosinophils Absolute 07/20/2021 0 30  0 00 - 0 61 Thousand/µL Final    Basophils Absolute 07/20/2021 0 04  0 00 - 0 10 Thousands/µL Final    Sodium 07/20/2021 141  136 - 145 mmol/L Final    Potassium 07/20/2021 3 6  3 5 - 5 3 mmol/L Final    Chloride 07/20/2021 107  100 - 108 mmol/L Final    CO2 07/20/2021 32  21 - 32 mmol/L Final    ANION GAP 07/20/2021 2* 4 - 13 mmol/L Final    BUN 07/20/2021 14  5 - 25 mg/dL Final    Creatinine 07/20/2021 0 83  0 60 - 1 30 mg/dL Final    Standardized to IDMS reference method    Glucose, Fasting 07/20/2021 87  65 - 99 mg/dL Final    Specimen collection should occur prior to Sulfasalazine administration due to the potential for falsely depressed results  Specimen collection should occur prior to Sulfapyridine administration due to the potential for falsely elevated results   Calcium 07/20/2021 9 5  8 3 - 10 1 mg/dL Final    Corrected Calcium 07/20/2021 10 0  8 3 - 10 1 mg/dL Final    AST 07/20/2021 33  5 - 45 U/L Final    Specimen collection should occur prior to Sulfasalazine administration due to the potential for falsely depressed results   ALT 07/20/2021 43  12 - 78 U/L Final    Specimen collection should occur prior to Sulfasalazine and/or Sulfapyridine administration due to the potential for falsely depressed results       Alkaline Phosphatase 07/20/2021 74  46 - 116 U/L Final    Total Protein 07/20/2021 6 7  6 4 - 8 2 g/dL Final    Albumin 07/20/2021 3 4* 3 5 - 5 0 g/dL Final    Total Bilirubin 07/20/2021 0 33  0 20 - 1 00 mg/dL Final    Use of this assay is not recommended for patients undergoing treatment with eltrombopag due to the potential for falsely elevated results      eGFR 07/20/2021 87  ml/min/1 73sq m Final    CRP 07/20/2021 <3 0  <3 0 mg/L Final    Sed Rate 07/20/2021 12  0 - 19 mm/hour Final

## 2022-01-19 ENCOUNTER — APPOINTMENT (OUTPATIENT)
Dept: LAB | Age: 72
End: 2022-01-19
Payer: MEDICARE

## 2022-01-19 LAB
ALBUMIN SERPL BCP-MCNC: 3.7 G/DL (ref 3.5–5)
ALP SERPL-CCNC: 66 U/L (ref 46–116)
ALT SERPL W P-5'-P-CCNC: 31 U/L (ref 12–78)
ANION GAP SERPL CALCULATED.3IONS-SCNC: 3 MMOL/L (ref 4–13)
AST SERPL W P-5'-P-CCNC: 26 U/L (ref 5–45)
BASOPHILS # BLD AUTO: 0.06 THOUSANDS/ΜL (ref 0–0.1)
BASOPHILS NFR BLD AUTO: 1 % (ref 0–1)
BILIRUB SERPL-MCNC: 0.47 MG/DL (ref 0.2–1)
BUN SERPL-MCNC: 16 MG/DL (ref 5–25)
CALCIUM SERPL-MCNC: 9.1 MG/DL (ref 8.3–10.1)
CHLORIDE SERPL-SCNC: 107 MMOL/L (ref 100–108)
CO2 SERPL-SCNC: 31 MMOL/L (ref 21–32)
CREAT SERPL-MCNC: 0.93 MG/DL (ref 0.6–1.3)
CRP SERPL QL: <3 MG/L
EOSINOPHIL # BLD AUTO: 0.35 THOUSAND/ΜL (ref 0–0.61)
EOSINOPHIL NFR BLD AUTO: 8 % (ref 0–6)
ERYTHROCYTE [DISTWIDTH] IN BLOOD BY AUTOMATED COUNT: 12.1 % (ref 11.6–15.1)
ERYTHROCYTE [SEDIMENTATION RATE] IN BLOOD: 9 MM/HOUR (ref 0–19)
GFR SERPL CREATININE-BSD FRML MDRD: 81 ML/MIN/1.73SQ M
GLUCOSE P FAST SERPL-MCNC: 95 MG/DL (ref 65–99)
HCT VFR BLD AUTO: 43.3 % (ref 36.5–49.3)
HGB BLD-MCNC: 13.4 G/DL (ref 12–17)
IMM GRANULOCYTES # BLD AUTO: 0.01 THOUSAND/UL (ref 0–0.2)
IMM GRANULOCYTES NFR BLD AUTO: 0 % (ref 0–2)
LYMPHOCYTES # BLD AUTO: 1.72 THOUSANDS/ΜL (ref 0.6–4.47)
LYMPHOCYTES NFR BLD AUTO: 41 % (ref 14–44)
MCH RBC QN AUTO: 31.2 PG (ref 26.8–34.3)
MCHC RBC AUTO-ENTMCNC: 30.9 G/DL (ref 31.4–37.4)
MCV RBC AUTO: 101 FL (ref 82–98)
MONOCYTES # BLD AUTO: 0.66 THOUSAND/ΜL (ref 0.17–1.22)
MONOCYTES NFR BLD AUTO: 16 % (ref 4–12)
NEUTROPHILS # BLD AUTO: 1.46 THOUSANDS/ΜL (ref 1.85–7.62)
NEUTS SEG NFR BLD AUTO: 34 % (ref 43–75)
NRBC BLD AUTO-RTO: 0 /100 WBCS
PLATELET # BLD AUTO: 178 THOUSANDS/UL (ref 149–390)
PMV BLD AUTO: 12.2 FL (ref 8.9–12.7)
POTASSIUM SERPL-SCNC: 3.9 MMOL/L (ref 3.5–5.3)
PROT SERPL-MCNC: 7.1 G/DL (ref 6.4–8.2)
RBC # BLD AUTO: 4.3 MILLION/UL (ref 3.88–5.62)
SODIUM SERPL-SCNC: 141 MMOL/L (ref 136–145)
WBC # BLD AUTO: 4.26 THOUSAND/UL (ref 4.31–10.16)

## 2022-01-19 PROCEDURE — 80053 COMPREHEN METABOLIC PANEL: CPT | Performed by: INTERNAL MEDICINE

## 2022-01-19 PROCEDURE — 86140 C-REACTIVE PROTEIN: CPT | Performed by: INTERNAL MEDICINE

## 2022-01-19 PROCEDURE — 85652 RBC SED RATE AUTOMATED: CPT | Performed by: INTERNAL MEDICINE

## 2022-01-19 PROCEDURE — 36415 COLL VENOUS BLD VENIPUNCTURE: CPT | Performed by: INTERNAL MEDICINE

## 2022-01-19 PROCEDURE — 85025 COMPLETE CBC W/AUTO DIFF WBC: CPT | Performed by: INTERNAL MEDICINE

## 2022-01-24 ENCOUNTER — HOSPITAL ENCOUNTER (OUTPATIENT)
Dept: BONE DENSITY | Facility: MEDICAL CENTER | Age: 72
Discharge: HOME | End: 2022-01-24
Payer: MEDICARE

## 2022-01-24 DIAGNOSIS — M81.8 OTHER OSTEOPOROSIS WITHOUT CURRENT PATHOLOGICAL FRACTURE: ICD-10-CM

## 2022-01-24 DIAGNOSIS — Z13.820 OSTEOPOROSIS SCREENING: ICD-10-CM

## 2022-01-24 PROCEDURE — 77080 DXA BONE DENSITY AXIAL: CPT

## 2022-02-07 ENCOUNTER — TELEPHONE (OUTPATIENT)
Dept: FAMILY MEDICINE CLINIC | Facility: CLINIC | Age: 72
End: 2022-02-07

## 2022-02-07 NOTE — TELEPHONE ENCOUNTER
----- Message from Tamara Martines MD sent at 2/6/2022  2:37 PM EST -----    Blood work came back normal

## 2022-02-08 ENCOUNTER — TELEPHONE (OUTPATIENT)
Dept: FAMILY MEDICINE CLINIC | Facility: CLINIC | Age: 72
End: 2022-02-08

## 2022-02-08 NOTE — TELEPHONE ENCOUNTER
Lm on home number labs were normal after I checked communication form and also that results could be found in his my chart   He is to call with any questions

## 2022-02-08 NOTE — TELEPHONE ENCOUNTER
----- Message from Santhosh Cabrera MD sent at 2/6/2022  2:37 PM EST -----    Blood work came back normal

## 2022-03-22 ENCOUNTER — OFFICE VISIT (OUTPATIENT)
Dept: FAMILY MEDICINE CLINIC | Facility: CLINIC | Age: 72
End: 2022-03-22
Payer: MEDICARE

## 2022-03-22 VITALS
TEMPERATURE: 97.8 F | HEART RATE: 96 BPM | RESPIRATION RATE: 16 BRPM | DIASTOLIC BLOOD PRESSURE: 76 MMHG | HEIGHT: 70 IN | OXYGEN SATURATION: 97 % | BODY MASS INDEX: 21.42 KG/M2 | WEIGHT: 149.6 LBS | SYSTOLIC BLOOD PRESSURE: 120 MMHG

## 2022-03-22 DIAGNOSIS — R73.9 HYPERGLYCEMIA: ICD-10-CM

## 2022-03-22 DIAGNOSIS — F43.10 PTSD (POST-TRAUMATIC STRESS DISORDER): ICD-10-CM

## 2022-03-22 DIAGNOSIS — L40.50 PSORIATIC ARTHRITIS (HCC): ICD-10-CM

## 2022-03-22 DIAGNOSIS — I10 ESSENTIAL HYPERTENSION: Primary | ICD-10-CM

## 2022-03-22 DIAGNOSIS — E78.2 MIXED HYPERLIPIDEMIA: ICD-10-CM

## 2022-03-22 DIAGNOSIS — I47.1 PSVT (PAROXYSMAL SUPRAVENTRICULAR TACHYCARDIA) (HCC): ICD-10-CM

## 2022-03-22 DIAGNOSIS — J41.0 SIMPLE CHRONIC BRONCHITIS (HCC): ICD-10-CM

## 2022-03-22 PROCEDURE — 99214 OFFICE O/P EST MOD 30 MIN: CPT | Performed by: FAMILY MEDICINE

## 2022-03-22 NOTE — PROGRESS NOTES
Assessment/Plan:  Chronic bronchitis ([facility] [address] )  Continue on albuterol as needed  Essential hypertension  Well controlled without meds  Will continue to monitor  Mixed hyperlipidemia  Stable  Continue on atorvastatin  Will continue to monitor fasting lipid profile  Hyperglycemia  Improved  Continue to follow low carb diet  Continue to monitor A1c  PSVT (paroxysmal supraventricular tachycardia) (HCC)  Stable  Asymptomatic  Continue to monitor  Psoriatic arthritis ([facility] [address] )  Fair control  Continue follow-up with rheumatologist     PTSD (post-traumatic stress disorder)  Stable  Continue on Zoloft  Continue follow-up with psych  Diagnoses and all orders for this visit:    Essential hypertension  -     CBC and differential; Future  -     Comprehensive metabolic panel; Future  -     Lipid Panel with Direct LDL reflex; Future  -     TSH, 3rd generation with Free T4 reflex; Future    Mixed hyperlipidemia  -     CBC and differential; Future  -     Comprehensive metabolic panel; Future  -     Lipid Panel with Direct LDL reflex; Future  -     TSH, 3rd generation with Free T4 reflex; Future    Simple chronic bronchitis (HCC)    PSVT (paroxysmal supraventricular tachycardia) (HCC)    Hyperglycemia  -     Hemoglobin A1C; Future    Psoriatic arthritis (HCC)    PTSD (post-traumatic stress disorder)          There are no Patient Instructions on file for this visit  Return in about 6 months (around 9/22/2022)  Subjective:      Patient ID: Desiree Jolly is a 66 y o  male  Chief Complaint   Patient presents with    Depression    Hyperlipidemia    Hypertension       Is here today for follow-up multiple medical problems  He has been taking his medications  Denies any side effects  His blood work was done in January was within normal limit  His A1c improved  He continues to follow-up with his rheumatologist   Today he complained of neck pain on the right side for the last 2 weeks    Denies any recent history of injury trauma  Denies any radiation of the pain to the upper extremities  The following portions of the patient's history were reviewed and updated as appropriate: allergies, current medications, past family history, past medical history, past social history, past surgical history and problem list     Review of Systems   Constitutional: Negative for chills and fever  HENT: Negative for trouble swallowing  Eyes: Negative for visual disturbance  Respiratory: Negative for cough and shortness of breath  Cardiovascular: Negative for chest pain and palpitations  Gastrointestinal: Negative for abdominal pain, blood in stool and vomiting  Endocrine: Negative for cold intolerance and heat intolerance  Genitourinary: Negative for difficulty urinating and dysuria  Musculoskeletal: Positive for arthralgias and neck pain  Negative for gait problem  Skin: Negative for rash  Neurological: Negative for dizziness, syncope and headaches  Hematological: Negative for adenopathy  Psychiatric/Behavioral: Negative for behavioral problems           Current Outpatient Medications   Medication Sig Dispense Refill    albuterol (PROVENTIL HFA,VENTOLIN HFA) 90 mcg/act inhaler Inhale 2 puffs every 6 (six) hours as needed for wheezing or shortness of breath 8 g 0    atorvastatin (LIPITOR) 40 mg tablet Take 40 mg by mouth       azelastine (ASTELIN) 0 1 % nasal spray 1 spray into each nostril 2 (two) times a day Use in each nostril as directed 1 mL 0    Carboxymethylcellulose Sod PF 0 25 % SOLN INSTILL ONE DROP BOTH EYES FOUR TIMES A DAY FOR DRY EYES      Cholecalciferol 2000 units CAPS Take 2,000 Units by mouth      dextran 70-hypromellose (GENTEAL TEARS) 0 1-0 3 % ophthalmic solution Apply 1 drop to eye      ketotifen (ZADITOR) 0 025 % ophthalmic solution INSTILL ONE DROP BOTH EYES TWICE A DAY      leflunomide (ARAVA) 20 MG tablet Take 1 tablet (20 mg total) by mouth daily 90 tablet 1    polyvinyl alcohol (LIQUIFILM TEARS) 1 4 % ophthalmic solution Apply 1 drop to eye      sertraline (ZOLOFT) 100 mg tablet Take 100 mg by mouth daily       SODIUM FLUORIDE, DENTAL GEL, 1 1 % GEL APPLY SMALL AMOUNT TO BY MOUTH DAILY      sulfaSALAzine (AZULFIDINE) 500 mg tablet Take 2 tabs twice a day 360 tablet 1    triamcinolone (KENALOG) 0 1 % cream Apply topically 2 (two) times a day 80 g 3    cetirizine (ZyrTEC) 10 mg tablet Take 1 tablet (10 mg total) by mouth daily (Patient not taking: Reported on 1/18/2022 ) 30 tablet 0     No current facility-administered medications for this visit  Objective:    /76 (BP Location: Left arm, Patient Position: Sitting, Cuff Size: Standard)   Pulse 96   Temp 97 8 °F (36 6 °C) (Tympanic)   Resp 16   Ht 5' 10" (1 778 m)   Wt 67 9 kg (149 lb 9 6 oz)   SpO2 97%   BMI 21 47 kg/m²        Physical Exam  Vitals and nursing note reviewed  Constitutional:       Appearance: He is well-developed  HENT:      Head: Normocephalic and atraumatic  Eyes:      Pupils: Pupils are equal, round, and reactive to light  Cardiovascular:      Rate and Rhythm: Normal rate and regular rhythm  Heart sounds: Normal heart sounds  Pulmonary:      Effort: Pulmonary effort is normal       Breath sounds: Normal breath sounds  Abdominal:      General: Bowel sounds are normal       Palpations: Abdomen is soft  Musculoskeletal:         General: Tenderness (Tenderness to palpation over the right side of his neck  Range of motion slightly limited due to pain ) present  Normal range of motion  Cervical back: Normal range of motion and neck supple  Lymphadenopathy:      Cervical: No cervical adenopathy  Skin:     General: Skin is warm  Findings: No rash  Neurological:      Mental Status: He is alert and oriented to person, place, and time  Cranial Nerves: No cranial nerve deficit                  Soo Diaz MD

## 2022-04-01 ENCOUNTER — OFFICE VISIT (OUTPATIENT)
Dept: URGENT CARE | Age: 72
End: 2022-04-01
Payer: MEDICARE

## 2022-04-01 VITALS
BODY MASS INDEX: 21.05 KG/M2 | OXYGEN SATURATION: 99 % | RESPIRATION RATE: 18 BRPM | WEIGHT: 147 LBS | SYSTOLIC BLOOD PRESSURE: 138 MMHG | DIASTOLIC BLOOD PRESSURE: 80 MMHG | HEART RATE: 77 BPM | TEMPERATURE: 97.6 F | HEIGHT: 70 IN

## 2022-04-01 DIAGNOSIS — M54.2 NECK PAIN ON RIGHT SIDE: Primary | ICD-10-CM

## 2022-04-01 PROCEDURE — 99213 OFFICE O/P EST LOW 20 MIN: CPT | Performed by: PHYSICIAN ASSISTANT

## 2022-04-01 PROCEDURE — G0463 HOSPITAL OUTPT CLINIC VISIT: HCPCS | Performed by: PHYSICIAN ASSISTANT

## 2022-04-01 RX ORDER — TIZANIDINE 2 MG/1
2 TABLET ORAL EVERY 8 HOURS PRN
Qty: 15 TABLET | Refills: 0 | Status: SHIPPED | OUTPATIENT
Start: 2022-04-01 | End: 2022-08-10

## 2022-04-01 NOTE — PATIENT INSTRUCTIONS
Take Ibuprofen 600 mg (or 3 over the counter tablets) three times daily with food or 2 tablets of Aleve twice daily with food  Avoid NSAIDs if you were given a Medrol Dose Pack, have a history of peptic ulcer disease or kidney disease, if you take blood thinning medications (Warfarin, Eliquis, Xarelto), or if you have undergone gastric bypass surgery    -Supplement with 1,000 mg Tylenol (2 extra strength tylenol or 1 tylenol arthritis) every 6 hours as needed  Do not exceed 4,000 mg of Tylenol in 24 hours if under the age of 72, if over the age of 72 do not exceed 3,000 mg of Tylenol in 24 hours  Avoid Tylenol if you have a history of liver disease  Tylenol can be taken with Medrol Dose Pack  -You MAY be prescribed a muscle relaxer, if so take them as prescribed  These medications can cause extreme tiredness  Do not drive or operate machinery or make important life decisions while you are taking this medication  -Apply heat for 20 minutes followed immediately by ice for 20 minutes at least 3 times daily for the next week  Allow 30 minutes minimum between sessions  -If symptoms do not improve in the next 3-4 days, follow-up with your primary care provider  -If symptoms worsen, you develop consistent numbness or tingling in either of your hands, legs, or lose control of your bowels or bladder, report to the emergency room  Acute Neck Pain   AMBULATORY CARE:   Acute neck pain  starts suddenly, increases quickly, and goes away in a few days  The pain may come and go, or be worse with certain movements  The pain may be only in your neck, or it may move to your arms, back, or shoulders  You may also have pain that starts in another body area and moves to your neck  Seek care immediately if:   · You have an injury that causes neck pain and shooting pain down your arms or legs  · Your neck pain suddenly becomes severe      · You have neck pain along with numbness, tingling, or weakness in your arms or legs     · You have a stiff neck, a headache, and a fever  Call your doctor if:   · You have new or worsening symptoms  · Your symptoms continue even after treatment  · You have questions or concerns about your condition or care  Treatment  may include any of the following, depending on what is causing your pain:  · Medicines  may be prescribed or recommended for pain  You may need medicine to treat nerve pain or to stop muscle spasms  Medicines may also be given to reduce inflammation  Your healthcare provider may inject medicine into a nerve to block pain  Over-the-counter NSAID medicine or acetaminophen may be recommended to help treat minor pain or inflammation  · Traction  is used to relieve pressure from nerves  Your head is gently pulled up and away from your neck  This stretches muscles and ligaments and makes more room for the spine  Your healthcare provider will tell you the kind of traction that will help your neck pain  Do not use traction devices at home unless directed by your healthcare provider  Manage or prevent acute neck pain:   · Rest your neck as directed  Do not make sudden movements, such as turning your head quickly  Your healthcare provider may recommend you wear a cervical collar for a short time  The collar will prevent you from moving your head  This will give your neck time to heal if an injury is causing your neck pain  Ask your healthcare provider when you can return to sports or other normal daily activities  · Apply heat as directed  Heat helps relieve pain and swelling  Use a heat wrap, or soak a small towel in warm water  Wring out the extra water  Apply the heat wrap or towel for 20 minutes every hour, or as directed  · Apply ice as directed  Ice helps relieve pain and swelling, and can help prevent tissue damage  Use an ice pack, or put ice in a bag  Cover the ice pack or back with a towel before you apply it to your neck   Apply the ice pack or ice for 15 minutes every hour, or as directed  Your healthcare provider can tell you how often to apply ice  · Do neck exercises as directed  Neck exercises help strengthen the muscles and increase range of motion  Your healthcare provider will tell you which exercises are right for you  He or she may give you instructions or recommend that you work with a physical therapist  Your healthcare provider or therapist can make sure you are doing the exercises correctly  · Maintain good posture  Try to keep your head and shoulders lifted when you sit  If you work in front of a computer, make sure the monitor is at the right level  You should not need to look up down to see the screen  You should also not have to lean forward to be able to read what is on the screen  Make sure your keyboard, mouse, and other computer items are placed where you do not have to extend your shoulder to reach them  Get up often if you work in front of a computer or sit for long periods of time  Stretch or walk around to keep your neck muscles loose  Follow up with your doctor as directed:  He or she may refer you to a specialist if your pain does not get better with treatment  Write down your questions so you remember to ask them during your visits  © Copyright [employer] 2022 Information is for End User's use only and may not be sold, redistributed or otherwise used for commercial purposes  All illustrations and images included in CareNotes® are the copyrighted property of [employer]  or Rebecca Fleming  The above information is an  only  It is not intended as medical advice for individual conditions or treatments  Talk to your doctor, nurse or pharmacist before following any medical regimen to see if it is safe and effective for you

## 2022-04-01 NOTE — PROGRESS NOTES
[facility]        NAME: Army Wong is a 66 y o  male  : 1950    MRN: [id number]  DATE: 2022  TIME: 11:48 AM    Assessment and Plan   Neck pain on right side [M54 2]  1  Neck pain on right side  Ambulatory Referral to Comprehensive Spine Program    tiZANidine (ZANAFLEX) 2 mg tablet   Pt with 1 month neck pain with no remembered inciting injury, reproduced with palation of right SCM and trapezius mm  Offered x-ray, but not necessary as no neurologic deficits present  Pt has decided to forego x-ray for now  Discussed OTC Ibuprofen, Aleve, Tylenol, and continued heat  Rx for Tizanidine  Pt has PSVT and heart block making Flexeril contraindicated  Referral to ambulatory spine program provided for definitive follow-up  Discussed red flag symptoms and when to report to the emergency department  Patient Instructions     Patient Instructions       Take Ibuprofen 600 mg (or 3 over the counter tablets) three times daily with food or 2 tablets of Aleve twice daily with food  Avoid NSAIDs if you were given a Medrol Dose Pack, have a history of peptic ulcer disease or kidney disease, if you take blood thinning medications (Warfarin, Eliquis, Xarelto), or if you have undergone gastric bypass surgery    -Supplement with 1,000 mg Tylenol (2 extra strength tylenol or 1 tylenol arthritis) every 6 hours as needed  Do not exceed 4,000 mg of Tylenol in 24 hours if under the age of 72, if over the age of 72 do not exceed 3,000 mg of Tylenol in 24 hours  Avoid Tylenol if you have a history of liver disease  Tylenol can be taken with Medrol Dose Pack  -You MAY be prescribed a muscle relaxer, if so take them as prescribed  These medications can cause extreme tiredness  Do not drive or operate machinery or make important life decisions while you are taking this medication  -Apply heat for 20 minutes followed immediately by ice for 20 minutes at least 3 times daily for the next week   Allow 30 minutes minimum between sessions  -If symptoms do not improve in the next 3-4 days, follow-up with your primary care provider  -If symptoms worsen, you develop consistent numbness or tingling in either of your hands, legs, or lose control of your bowels or bladder, report to the emergency room  Acute Neck Pain   AMBULATORY CARE:   Acute neck pain  starts suddenly, increases quickly, and goes away in a few days  The pain may come and go, or be worse with certain movements  The pain may be only in your neck, or it may move to your arms, back, or shoulders  You may also have pain that starts in another body area and moves to your neck  Seek care immediately if:   · You have an injury that causes neck pain and shooting pain down your arms or legs  · Your neck pain suddenly becomes severe  · You have neck pain along with numbness, tingling, or weakness in your arms or legs  · You have a stiff neck, a headache, and a fever  Call your doctor if:   · You have new or worsening symptoms  · Your symptoms continue even after treatment  · You have questions or concerns about your condition or care  Treatment  may include any of the following, depending on what is causing your pain:  · Medicines  may be prescribed or recommended for pain  You may need medicine to treat nerve pain or to stop muscle spasms  Medicines may also be given to reduce inflammation  Your healthcare provider may inject medicine into a nerve to block pain  Over-the-counter NSAID medicine or acetaminophen may be recommended to help treat minor pain or inflammation  · Traction  is used to relieve pressure from nerves  Your head is gently pulled up and away from your neck  This stretches muscles and ligaments and makes more room for the spine  Your healthcare provider will tell you the kind of traction that will help your neck pain  Do not use traction devices at home unless directed by your healthcare provider      Manage or prevent acute neck pain:   · Rest your neck as directed  Do not make sudden movements, such as turning your head quickly  Your healthcare provider may recommend you wear a cervical collar for a short time  The collar will prevent you from moving your head  This will give your neck time to heal if an injury is causing your neck pain  Ask your healthcare provider when you can return to sports or other normal daily activities  · Apply heat as directed  Heat helps relieve pain and swelling  Use a heat wrap, or soak a small towel in warm water  Wring out the extra water  Apply the heat wrap or towel for 20 minutes every hour, or as directed  · Apply ice as directed  Ice helps relieve pain and swelling, and can help prevent tissue damage  Use an ice pack, or put ice in a bag  Cover the ice pack or back with a towel before you apply it to your neck  Apply the ice pack or ice for 15 minutes every hour, or as directed  Your healthcare provider can tell you how often to apply ice  · Do neck exercises as directed  Neck exercises help strengthen the muscles and increase range of motion  Your healthcare provider will tell you which exercises are right for you  He or she may give you instructions or recommend that you work with a physical therapist  Your healthcare provider or therapist can make sure you are doing the exercises correctly  · Maintain good posture  Try to keep your head and shoulders lifted when you sit  If you work in front of a computer, make sure the monitor is at the right level  You should not need to look up down to see the screen  You should also not have to lean forward to be able to read what is on the screen  Make sure your keyboard, mouse, and other computer items are placed where you do not have to extend your shoulder to reach them  Get up often if you work in front of a computer or sit for long periods of time  Stretch or walk around to keep your neck muscles loose      Follow up with your doctor as directed:  He or she may refer you to a specialist if your pain does not get better with treatment  Write down your questions so you remember to ask them during your visits  © Copyright [employer] 2022 Information is for End User's use only and may not be sold, redistributed or otherwise used for commercial purposes  All illustrations and images included in CareNotes® are the copyrighted property of A D A M [employer] Inc  or [facility] Rosa Cartwright   The above information is an  only  It is not intended as medical advice for individual conditions or treatments  Talk to your doctor, nurse or pharmacist before following any medical regimen to see if it is safe and effective for you  Follow up with PCP in 3-5 days  Proceed to  ER if symptoms worsen  Chief Complaint     Chief Complaint   Patient presents with    Neck Pain     pt reports right posterior neck pain x 1 month  History of Present Illness       66year old male presents with complaint of     Neck Pain   This is a new problem  The current episode started more than 1 month ago  The problem occurs constantly  The problem has been gradually worsening  The pain is associated with nothing  The pain is present in the right side  The quality of the pain is described as cramping and aching  The pain is at a severity of 4/10  The pain is moderate  The symptoms are aggravated by bending  Worse during: worse in the morning  Stiffness is present in the morning  Pertinent negatives include no chest pain, fever, headaches, numbness, paresis, tingling or weakness  He has tried heat for the symptoms  The treatment provided mild relief  Review of Systems   Review of Systems   Constitutional: Negative for fever  Cardiovascular: Negative for chest pain  Musculoskeletal: Positive for neck pain  Neurological: Negative for tingling, weakness, numbness and headaches           Current Medications       Current Outpatient Medications:     albuterol (PROVENTIL HFA,VENTOLIN HFA) 90 mcg/act inhaler, Inhale 2 puffs every 6 (six) hours as needed for wheezing or shortness of breath, Disp: 8 g, Rfl: 0    atorvastatin (LIPITOR) 40 mg tablet, Take 40 mg by mouth , Disp: , Rfl:     azelastine (ASTELIN) 0 1 % nasal spray, 1 spray into each nostril 2 (two) times a day Use in each nostril as directed, Disp: 1 mL, Rfl: 0    Carboxymethylcellulose Sod PF 0 25 % SOLN, INSTILL ONE DROP BOTH EYES FOUR TIMES A DAY FOR DRY EYES, Disp: , Rfl:     Cholecalciferol 2000 units CAPS, Take 2,000 Units by mouth, Disp: , Rfl:     dextran 70-hypromellose (GENTEAL TEARS) 0 1-0 3 % ophthalmic solution, Apply 1 drop to eye, Disp: , Rfl:     ketotifen (ZADITOR) 0 025 % ophthalmic solution, INSTILL ONE DROP BOTH EYES TWICE A DAY, Disp: , Rfl:     leflunomide (ARAVA) 20 MG tablet, Take 1 tablet (20 mg total) by mouth daily, Disp: 90 tablet, Rfl: 1    polyvinyl alcohol (LIQUIFILM TEARS) 1 4 % ophthalmic solution, Apply 1 drop to eye, Disp: , Rfl:     sertraline (ZOLOFT) 100 mg tablet, Take 100 mg by mouth daily , Disp: , Rfl:     SODIUM FLUORIDE, DENTAL GEL, 1 1 % GEL, APPLY SMALL AMOUNT TO BY MOUTH DAILY, Disp: , Rfl:     sulfaSALAzine (AZULFIDINE) 500 mg tablet, Take 2 tabs twice a day, Disp: 360 tablet, Rfl: 1    triamcinolone (KENALOG) 0 1 % cream, Apply topically 2 (two) times a day, Disp: 80 g, Rfl: 3    cetirizine (ZyrTEC) 10 mg tablet, Take 1 tablet (10 mg total) by mouth daily (Patient not taking: Reported on 1/18/2022 ), Disp: 30 tablet, Rfl: 0    tiZANidine (ZANAFLEX) 2 mg tablet, Take 1 tablet (2 mg total) by mouth every 8 (eight) hours as needed for muscle spasms, Disp: 15 tablet, Rfl: 0    Current Allergies     Allergies as of 04/01/2022 - Reviewed 04/01/2022   Allergen Reaction Noted    Cefadroxil Other (See Comments) 07/07/2004    Midazolam Other (See Comments) 12/20/2017    Prazosin  03/16/2015    Fentanyl Palpitations 02/02/2019 The following portions of the patient's history were reviewed and updated as appropriate: allergies, current medications, past family history, past medical history, past social history, past surgical history and problem list      Past Medical History:   Diagnosis Date    Gout     Hypertension     Kidney stone     Psoriatic arthritis ([address] )     PTSD (post-traumatic stress disorder)     SVT (supraventricular tachycardia) (HCC)     Thyroid tumor, benign        Past Surgical History:   Procedure Laterality Date    CARDIAC ELECTROPHYSIOLOGY MAPPING AND ABLATION      SVT    PARATHYROIDECTOMY      PROSTATE SURGERY         Family History   Problem Relation Age of Onset    Coronary artery disease Brother     Hypertension Mother          Medications have been verified  Objective   /80   Pulse 77   Temp 97 6 °F (36 4 °C)   Resp 18   Ht 5' 10" (1 778 m)   Wt 66 7 kg (147 lb)   SpO2 99%   BMI 21 09 kg/m²   No LMP for male patient  Physical Exam     Physical Exam  Vitals and nursing note reviewed  Constitutional:       General: He is awake  He is not in acute distress  Appearance: Normal appearance  He is well-developed and well-groomed  He is not ill-appearing, toxic-appearing or diaphoretic  HENT:      Head: Normocephalic and atraumatic  Right Ear: Hearing and external ear normal       Left Ear: Hearing and external ear normal    Eyes:      General: Lids are normal  Vision grossly intact  Gaze aligned appropriately  Neck:        Comments: Pt has 5/5 strength to BUE and BLE, sensation grossly intact and equal to BUE and BLE  PT pulse 2+ and brisk bilaterally, radial pulse 2+ and brisk bilaterally  Cardiovascular:      Rate and Rhythm: Normal rate  Pulmonary:      Effort: Pulmonary effort is normal       Comments: Patient is speaking in full sentences with no increased respiratory effort  No audible wheezing or stridor     Musculoskeletal:      Cervical back: Normal range of motion  No edema, erythema, signs of trauma, rigidity, torticollis or crepitus  Pain with movement (flexion) and muscular tenderness (right SCM and trap) present  No spinous process tenderness  Normal range of motion  Skin:     General: Skin is warm and dry  Neurological:      Mental Status: He is alert and oriented to person, place, and time  Coordination: Coordination is intact  Gait: Gait is intact  Psychiatric:         Attention and Perception: Attention and perception normal          Mood and Affect: Mood and affect normal          Speech: Speech normal          Behavior: Behavior normal  Behavior is cooperative  Note: Portions of this record may have been created with voice recognition software  Occasional wrong word or "sound a like" substitutions may have occurred due to the inherent limitations of voice recognition software  Please read the chart carefully and recognize, using context, where substitutions have occurred  *

## 2022-04-05 ENCOUNTER — TELEPHONE (OUTPATIENT)
Dept: PHYSICAL THERAPY | Facility: OTHER | Age: 72
End: 2022-04-05

## 2022-04-05 NOTE — TELEPHONE ENCOUNTER
Nurse reached out to discuss recent referral entered for  Comprehensive Spine program and offerings  Nurse called preferred number listed in demographics-Unable to LM d/t pts voice MB is full  Referral deferred for f/u attempt and will await CB from pt as well

## 2022-04-13 ENCOUNTER — TELEPHONE (OUTPATIENT)
Dept: PHYSICAL THERAPY | Facility: OTHER | Age: 72
End: 2022-04-13

## 2022-04-13 NOTE — TELEPHONE ENCOUNTER
Nurse reached out to discuss recent referral entered for  Comprehensive Spine program and offerings  Nurse unable to [facility] for patient as his voice MB is full      Referral closed per protocol

## 2022-04-22 ENCOUNTER — CONSULT (OUTPATIENT)
Dept: PAIN MEDICINE | Facility: MEDICAL CENTER | Age: 72
End: 2022-04-22
Payer: MEDICARE

## 2022-04-22 VITALS
WEIGHT: 148 LBS | BODY MASS INDEX: 21.19 KG/M2 | HEART RATE: 74 BPM | DIASTOLIC BLOOD PRESSURE: 58 MMHG | HEIGHT: 70 IN | TEMPERATURE: 98.1 F | SYSTOLIC BLOOD PRESSURE: 118 MMHG | OXYGEN SATURATION: 97 %

## 2022-04-22 DIAGNOSIS — M79.18 MYOFASCIAL PAIN SYNDROME: Primary | ICD-10-CM

## 2022-04-22 PROCEDURE — 99204 OFFICE O/P NEW MOD 45 MIN: CPT | Performed by: PHYSICAL MEDICINE & REHABILITATION

## 2022-04-22 NOTE — PROGRESS NOTES
Assessment  1  Myofascial pain syndrome        Plan  1  Initiate PT  2  Follow up at completion of PT if still having pain and consider TPIs  3  Would also obtain C spine radiographs if continuing symptoms after PT    My impressions and treatment recommendations were discussed in detail with the patient who verbalized understanding and had no further questions  Discharge instructions were provided  I personally saw and examined the patient and I agree with the above discussed plan of care  Orders Placed This Encounter   Procedures    Ambulatory referral to Physical Therapy     Standing Status:   Future     Standing Expiration Date:   4/22/2023     Referral Priority:   Routine     Referral Type:   Physical Therapy     Referral Reason:   Specialty Services Required     Requested Specialty:   Physical Therapy     Number of Visits Requested:   1     Expiration Date:   4/22/2023     No orders of the defined types were placed in this encounter  History of Present Illness    Hallie Waters is a 66 y o  male seen in consultation regarding right sided neck pain and numbness and tingling  The patient has been experiencing symptoms over the past 2 months and was referred from urgent care  He is describing moderate symptoms currently with pain rated as a 2/10 that is occasional in nature and typically worse in the morning and nighttime  This is characterized as burning, numbness, pins and needles, pressure-like, dull, aching type pain  This is localized to the right cervical paraspinal musculature and sternocleidomastoid  The patient notes symptoms most with rotation of the cervical spine  Aggravating features include lying down, sitting  Alleviating factors are unknown  No recent imaging of the cervical spine and he has not attempted any conservative care at this time  Social history negative for tobacco marijuana and alcohol use      The patient was prescribed a muscle relaxer however he did not feel this provided any benefit  I have personally reviewed and/or updated the patient's past medical history, past surgical history, family history, social history, current medications, allergies, and vital signs today  Review of Systems   Constitutional: Negative for fever and unexpected weight change  HENT: Negative for trouble swallowing  Eyes: Negative for visual disturbance  Respiratory: Negative for shortness of breath and wheezing  Cardiovascular: Negative for chest pain and palpitations  Gastrointestinal: Negative for constipation, diarrhea, nausea and vomiting  Endocrine: Negative for cold intolerance, heat intolerance and polydipsia  Genitourinary: Negative for difficulty urinating and frequency  Musculoskeletal: Positive for myalgias and neck pain  Negative for arthralgias, gait problem and joint swelling  Skin: Positive for rash  Neurological: Positive for numbness and headaches  Negative for dizziness, seizures, syncope and weakness  Hematological: Does not bruise/bleed easily  Psychiatric/Behavioral: Negative for dysphoric mood  All other systems reviewed and are negative        Patient Active Problem List   Diagnosis    PSVT (paroxysmal supraventricular tachycardia) ([facility] [address] )    ED (erectile dysfunction) of organic origin    Hypercalcemia    Lipid disorder    Nephrolithiasis    Osteopenia of multiple sites    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    Inflammatory arthritis    Preop examination    Vitamin D deficiency    Refractive amblyopia    Retinal dot hemorrhage    Presbyopia    Polyp of colon    Nonexudative senile macular degeneration of retina    Macular drusen    Insomnia    Essential hypertension    Mixed hyperlipidemia    Ganglion    Depressive disorder    Combined form of senile cataract    Chronic bronchitis (HCC)    Asthma    Allergic rhinitis    Allergic conjunctivitis    Alcohol abuse    Suicidal ideation  Seborrheic dermatitis    Bilateral carotid artery stenosis    Gastroesophageal reflux disease    Benign prostatic hyperplasia with weak urinary stream    History of hyperparathyroidism    Acute pain of right shoulder    Healthcare maintenance    Psoriasis    Bilateral impacted cerumen    Psoriatic arthritis (HCC)    Hyperglycemia    Chronic pain of right knee    PTSD (post-traumatic stress disorder)       Past Medical History:   Diagnosis Date    Anxiety     Arthritis     Depression     GERD (gastroesophageal reflux disease)     Gout     Hyperlipidemia     Hypertension     Kidney stone     Psoriatic arthritis (HCC)     PTSD (post-traumatic stress disorder)     SV[facility] tachycardia) ([facility])     Thyroid tumor, benign        Past Surgical History:   Procedure Laterality Date    CARDIAC ELECTROPHYSIOLOGY MAPPING AND ABLATION      SVT    PARATHYROIDECTOMY      PROSTATE SURGERY         Family History   Problem Relation Age of Onset    Coronary artery disease Brother     Hypertension Mother     No Known Problems Father        Social History     Occupational History    Not on file   Tobacco Use    Smoking status: Never Smoker    Smokeless tobacco: Never Used   Vaping Use    Vaping Use: Never used   Substance and Sexual Activity    Alcohol use: No    Drug use: No    Sexual activity: Not Currently       Current Outpatient Medications on File Prior to Visit   Medication Sig    albuterol (PROVENTIL HFA,VENTOLIN HFA) 90 mcg/act inhaler Inhale 2 puffs every 6 (six) hours as needed for wheezing or shortness of breath    atorvastatin (LIPITOR) 40 mg tablet Take 40 mg by mouth     Carboxymethylcellulose Sod PF 0 25 % SOLN INSTILL ONE DROP BOTH EYES FOUR TIMES A DAY FOR DRY EYES    Cholecalciferol 2000 units CAPS Take 2,000 Units by mouth    dextran 70-hypromellose (GENTEAL TEARS) 0 1-0 3 % ophthalmic solution Apply 1 drop to eye    ketotifen (ZADITOR) 0 025 % ophthalmic solution INSTILL ONE DROP BOTH EYES TWICE A DAY    leflunomide (ARAVA) 20 MG tablet Take 1 tablet (20 mg total) by mouth daily    polyvinyl alcohol (LIQUIFILM TEARS) 1 4 % ophthalmic solution Apply 1 drop to eye    sertraline (ZOLOFT) 100 mg tablet Take 100 mg by mouth daily     SODIUM FLUORIDE, DENTAL GEL, 1 1 % GEL APPLY SMALL AMOUNT TO BY MOUTH DAILY    sulfaSALAzine (AZULFIDINE) 500 mg tablet Take 2 tabs twice a day    triamcinolone (KENALOG) 0 1 % cream Apply topically 2 (two) times a day    azelastine (ASTELIN) 0 1 % nasal spray 1 spray into each nostril 2 (two) times a day Use in each nostril as directed (Patient not taking: Reported on 4/22/2022 )    cetirizine (ZyrTEC) 10 mg tablet Take 1 tablet (10 mg total) by mouth daily (Patient not taking: Reported on 1/18/2022 )    tiZANidine (ZANAFLEX) 2 mg tablet Take 1 tablet (2 mg total) by mouth every 8 (eight) hours as needed for muscle spasms (Patient not taking: Reported on 4/22/2022 )     No current facility-administered medications on file prior to visit  Allergies   Allergen Reactions    Cefadroxil Other (See Comments)     bleeding internally    Midazolam Other (See Comments)     ARRHYTHMIA    Prazosin     Fentanyl Palpitations       Physical Exam    /58   Pulse 74   Temp 98 1 °F (36 7 °C)   Ht 5' 10" (1 778 m)   Wt 67 1 kg (148 lb)   SpO2 97%   BMI 21 24 kg/m²     Constitutional: normal, well developed, well nourished, alert, in no distress and non-toxic and no overt pain behavior    Eyes: anicteric  HEENT: grossly intact  Neck: supple, symmetric, trachea midline and no masses   Pulmonary:even and unlabored  Cardiovascular:No edema or pitting edema present  Skin:Normal without rashes or lesions and well hydrated  Psychiatric:Mood and affect appropriate  Neurologic:Cranial Nerves II-XII grossly intact  Musculoskeletal:normal, except for tenderness to palpation along the right trapezius and sternocleidomastoid muscles reproducing his pain complaint, also notes pain with cervical rotation to the right and left, less so with other planes of motion    Imaging

## 2022-04-22 NOTE — PATIENT INSTRUCTIONS
Trigger Point Injection   AMBULATORY CARE:   A trigger point injection  is used to relax a muscle knot  This helps relieve pain  You may be able to have more than one trigger point treated during a session  How to prepare for a trigger point injection:   · Your healthcare provider will tell you how to prepare  Arrange to have someone drive you home after the injection  · Tell your provider about all medicines you take, including pain medicine, blood thinners, and muscle relaxers  He or she will tell you if you need to stop any medicine for the injection, and when to stop  He or she will tell you which medicines to take or not take on the day of the injection  · Tell your provider about all your allergies, including to any pain medicine  What will happen during a trigger point injection:   · You may be sitting or lying, depending on where the trigger point is located  Your healthcare provider will feel for a knot in the muscle  He or she may desmond your skin over the knot  · Your provider will put a needle through your skin and into the trigger point  Saline (salt solution), pain relievers, or other medicines may be pushed through the needle into the trigger point  Your provider may use only a dry needle (no medicine)  He or she will pull the needle almost all the way out and then push it in again  He or she will repeat this several times until the muscle stops twitching or feels relaxed  · Your provider will remove the needle and stretch the muscle area  He or she may apply pressure to the area for 2 minutes  A bandage will be put over the injection site to prevent bleeding or an infection  What to expect after a trigger point injection:   · You may feel pain relief right away  It is normal for some pain to start again 2 hours later  An ice pack or over-the-counter pain medicine can help lower the pain  · You may feel sore in the injection site for a few days   If you need another injection in the same area, wait until the area is not sore  · Your healthcare provider may give you specific activity instructions to follow at home or recommend physical therapy  In general, you should try to stay active  Avoid strenuous activity for the first 3 or 4 days after the injection  · Do not have more injections if you still have trigger point pain after 2 or 3 injections  Risks of a trigger point injection:  You may have a severe allergic reaction to pain medicine injected  The injection may be painful, or you may be sore where you got the injection  You may bleed, bruise, or develop an infection in the injection area  The injection may cause you to feel faint  Rarely, the needle may cause muscle or blood vessel damage or your lung may collapse if you get the injection near your chest   Call your local emergency number (911 in the [address] [place] [address],[address]), or have someone call if:   · Your mouth and throat are swollen  · You are wheezing or have trouble breathing  · You have chest pain or your heart is beating faster than usual     · You feel like you are going to faint  When should I seek immediate care? · Your face is red or swollen  · You have hives that spread over your body  · You feel weak or dizzy  Call your doctor or pain specialist if:   · You have a fever within 1 week of the injection  · You have redness or swelling within 1 week of the injection  · You have new or worsening pain near the injection site  · You have questions or concerns about your condition or care  Self-care:   · Stay active after you have trigger point injections  Gently move your joints through their full range of motion during the first week  Avoid strenuous activity for 3 or 4 days  · Do regular stretches of the trigger point muscle  Place gentle pressure on the trigger point, and then stretch the muscle  Ask your healthcare provider for more information about how to stretch and apply pressure      · Apply ice to the injection site  Use an ice pack, or put ice in a plastic bag  Cover the bag with a towel before you apply it  Apply ice for 15 to 20 minutes every hour, or as directed  · Apply heat to trigger point sites  Heat can help relax muscles and relieve trigger point pain  Use a heat pack or a heating pad set on low  Apply heat for 15 minutes every hour, or as directed  Follow up with your doctor or pain specialist as directed:  Write down your questions so you remember to ask them during your visits  © Copyright [address] 2022 Information is for End User's use only and may not be sold, redistributed or otherwise used for commercial purposes  All illustrations and images included in CareNotes® are the copyrighted property of [employer]  or [facility] Rosa Fleming  The above information is an  only  It is not intended as medical advice for individual conditions or treatments  Talk to your doctor, nurse or pharmacist before following any medical regimen to see if it is safe and effective for you  No

## 2022-04-26 ENCOUNTER — EVALUATION (OUTPATIENT)
Dept: PHYSICAL THERAPY | Facility: REHABILITATION | Age: 72
End: 2022-04-26
Payer: MEDICARE

## 2022-04-26 DIAGNOSIS — M79.18 MYOFASCIAL PAIN SYNDROME: ICD-10-CM

## 2022-04-26 DIAGNOSIS — M54.2 PAIN OF CERVICAL SPINE: Primary | ICD-10-CM

## 2022-04-26 PROCEDURE — 97162 PT EVAL MOD COMPLEX 30 MIN: CPT | Performed by: PHYSICAL THERAPIST

## 2022-04-26 PROCEDURE — 97110 THERAPEUTIC EXERCISES: CPT | Performed by: PHYSICAL THERAPIST

## 2022-04-26 NOTE — PROGRESS NOTES
PT Evaluation     Today's date: 2022  Patient name: Brianna Almazan  : 1950  MRN: [id number]  Referring provider: Pantera Cuba DO  Dx:   Encounter Diagnosis     ICD-10-CM    1  Pain of cervical spine  M54 2    2  Myofascial pain syndrome  M79 18 Ambulatory referral to Physical Therapy                  Assessment  Assessment details: Pt is a 66year old right handed male presenting to PT with acute on chronic right cervical pain  Pt presents with pain, decreased range of motion, decreased strength, and decreased tolerance to activity  Pt presents with signs and symptoms consistent with 1st rib dysfunction and postural dysfunction  Pt is a good candidate for outpatient physical therapy and would benefit from skilled physical therapy to address limitations and to achieve goals  Thank you for this referral    Impairments: abnormal coordination, abnormal or restricted ROM, activity intolerance, impaired physical strength, lacks appropriate home exercise program and pain with function  Understanding of Dx/Px/POC: good   Prognosis: good    Goals  ST  Patient will report 25% decrease in pain in 4 weeks  2  Patient will demonstrate 25% improvement in ROM in 4 weeks  3  Patient will demonstrate 1/2 grade improvement in strength in 4 weeks  LT  Patient will be able to perform IADLS without restriction or pain by discharge  2  Patient will be independent in HEP by discharge  3  Patient will be able to return to recreational/work duties without restriction or pain by discharge        Plan  Patient would benefit from: PT eval and skilled PT  Planned modality interventions: cryotherapy and thermotherapy: hydrocollator packs  Planned therapy interventions: IADL retraining, body mechanics training, flexibility, functional ROM exercises, home exercise program, neuromuscular re-education, manual therapy, postural training, strengthening, stretching, therapeutic activities, therapeutic exercise and joint mobilization  Frequency: 2x week  Duration in visits: 8  Duration in weeks: 4  Treatment plan discussed with: patient        Subjective Evaluation    History of Present Illness  Mechanism of injury: Pt is a 66year old right handed male presenting to PT with acute on chronic right cervical pain  Pt reports he had the same issue about a year ago, and it resolved on its own after several days  Pt reports about 3 months ago he began having the same pain, which did not resolve  Pt went to ER, was prescribed muscle relaxer, and was referred to [facility]     He also had f/u with PCP, no diagnostic imaging performed, pt referred to OPPT for same diagnosis  Pt is a retired gupta, 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[employer], does do yard work, some heavy lifting  Pain Location: right cervical spine, radiating up into head towards ear    Pain Type: varies, tingling, numbness, sharp, burning    Pain Intensity:  Current: 3  Best: 2  Worst: 4      Pt reports increased pain and/or difficulty with: turning head to either side, looking down  Pt reports decreased pain with:  rest            Recurrent probem    Quality of life: good      Diagnostic Tests  No diagnostic tests performed  Treatments  Previous treatment: chiropractic and medication  Patient Goals  Patient goals for therapy: decreased pain, increased motion, independence with ADLs/IADLs and increased strength          Objective     Concurrent Complaints  Negative for night pain, disturbed sleep, dizziness, faints and headaches    Postural Observations  Seated posture: fair  Standing posture: fair    Additional Postural Observation Details  Pt with moderate forward head, moderate rounded shoulders and increased thoracic kyphosis       Tenderness     Additional Tenderness Details  TTP: R 1st rib, UT, LS, SCM, SOR    Neurological Testing     Sensation   Cervical/Thoracic   Left   Intact: light touch    Right   Intact: light touch    Active Range of Motion   Cervical/Thoracic Spine       Cervical    Flexion: 24 degrees  with pain  Extension: 25 degrees      Left lateral flexion: 18 degrees     with pain  Right lateral flexion: 16 degrees     with pain  Left rotation: 52 degrees with pain  Right rotation: 49 degrees    with pain    Joint Play     Hypomobile: C7, T1, T2, T3, T4, T5, T6 and 1st rib     Strength/Myotome Testing     Left Shoulder     Planes of Motion   Flexion: 5   Abduction: 5   External rotation at 0°: 5   Internal rotation at 0°: 5     Isolated Muscles   Lower trapezius: 4-   Middle trapezius: 4-     Right Shoulder     Planes of Motion   Flexion: 4+   Abduction: 4+   External rotation at 0°: 4+   Internal rotation at 0°: 4+     Isolated Muscles   Lower trapezius: 3+   Middle trapezius: 3+     Left Elbow   Flexion: 5  Extension: 5    Right Elbow   Flexion: 4+  Extension: 4+    Tests   Cervical   Positive neck flexor muscle endurance test     Left   Negative Spurling's Test A  Right   Positive cervical flexion-rotation test    Negative Spurling's Test A     Neuro Exam:     Headaches   Patient reports headaches: No               Precautions: anxiety, depression, GERD, h/o gout, depression, HLD, HTN, PTSD, SVT, psoariatic arthritis       Daily Treatment Diary      Assessment  4/26                     Eval/Reval  MD                     FOTO  69/70       **         **   Manuals    R 1st Rib Mob                        R 1st Rib MWM              R UT, LS & SCM Stretch              R UT & LS STM             Exercise Diary     UT & LS Stretches  20"x3 ea                      Pec Stretch                        Biceps Wall Stretch                        Upper Thoracic Extension Stretch                        Supine Foam Roller Series                        Supine AAROM Chin Tuckes                        Prone Retraction                        Prone H ABD Modalities    P C-T Spine  90/90  Pre-Tx

## 2022-04-29 ENCOUNTER — OFFICE VISIT (OUTPATIENT)
Dept: PHYSICAL THERAPY | Facility: REHABILITATION | Age: 72
End: 2022-04-29
Payer: MEDICARE

## 2022-04-29 DIAGNOSIS — M79.18 MYOFASCIAL PAIN SYNDROME: ICD-10-CM

## 2022-04-29 DIAGNOSIS — M54.2 PAIN OF CERVICAL SPINE: Primary | ICD-10-CM

## 2022-04-29 PROCEDURE — 97140 MANUAL THERAPY 1/> REGIONS: CPT | Performed by: PHYSICAL THERAPIST

## 2022-04-29 PROCEDURE — 97112 NEUROMUSCULAR REEDUCATION: CPT | Performed by: PHYSICAL THERAPIST

## 2022-04-29 NOTE — PROGRESS NOTES
Daily Note     Today's date: 2022  Patient name: Kye Gao  : 1950  MRN: [id number]  Referring provider: Sarah Samuels DO  Dx:   Encounter Diagnosis     ICD-10-CM    1  Pain of cervical spine  M54 2    2  Myofascial pain syndrome  M79 18                   Subjective: Pt reports he did well with his exercises at home, no increase in pain  Objective: See treatment diary below  Pt issued updated HEP to which he demonstrated and verbalized understanding  Assessment: Pt with good tolerance to initiation of manual techniques with improved mobility and cervical AROM with completion  Pt with good response to addition of supine chin tucks, supine foam roller series and corner pec stretch  Will continue to progress program as able  Pt will benefit from continued skilled PT intervention in order to address his remaining limitations and to restore maximal function  Plan: Continue per plan of care  Progress treatment as tolerated         Precautions: anxiety, depression, GERD, h/o gout, depression, HLD, HTN, PTSD, SVT, psoariatic arthritis       Daily Treatment Diary      Assessment                     Aaron/Joey CASTELAN                     FOTO  69/70       **         **   Manuals    R 1st Rib Mob    MD                    R 1st Rib MWM   MD            R UT, LS & SCM Stretch   MD            R UT & LS STM   MD           Exercise Diary     UT & LS Stretches  20"x3 ea  20"x3 ea                    Corner Pec Stretch    20"x 3                    Biceps Wall Stretch    NV                    Upper Thoracic Extension Stretch                        Supine Foam Roller Series    1'x6                    Supine AAROM Chin Tucks    5"x10                    Prone Retraction                        Prone H ABD Modalities    [facility] C-T Spine  90/90  Pre-Tx    10'

## 2022-05-03 ENCOUNTER — OFFICE VISIT (OUTPATIENT)
Dept: PHYSICAL THERAPY | Facility: REHABILITATION | Age: 72
End: 2022-05-03
Payer: MEDICARE

## 2022-05-03 DIAGNOSIS — M54.2 PAIN OF CERVICAL SPINE: Primary | ICD-10-CM

## 2022-05-03 DIAGNOSIS — M79.18 MYOFASCIAL PAIN SYNDROME: ICD-10-CM

## 2022-05-03 PROCEDURE — 97112 NEUROMUSCULAR REEDUCATION: CPT

## 2022-05-03 PROCEDURE — 97140 MANUAL THERAPY 1/> REGIONS: CPT

## 2022-05-03 NOTE — PROGRESS NOTES
Daily Note     Today's date: 5/3/2022  Patient name: Grace Thomson  : 1950  MRN: [id number]  Referring provider: Maria Esther Castro DO  Dx:   Encounter Diagnosis     ICD-10-CM    1  Pain of cervical spine  M54 2    2  Myofascial pain syndrome  M79 18                   Subjective: pt reports with c/o tingling/numbness in right cervical region pre-tx  He noted no adverse reactions or soreness following last visit  Objective: See treatment diary below      Assessment: Tolerated treatment well  Positive response with exercises and manuals  Significant soft tissue restrictions palpated in UT/LS with some TTP present; denied pain and numbness/tingling in R cervical region upon completion  Added wall bicep stretch with appropriate stretching response  Patient demonstrated fatigue post treatment, exhibited good technique with therapeutic exercises and would benefit from continued PT  Plan: Continue per plan of care  Progress treatment as tolerated         Precautions: anxiety, depression, GERD, h/o gout, depression, HLD, HTN, PTSD, SVT, psoariatic arthritis       Daily Treatment Diary      Assessment  4/26  4/29  5/3                 Aaron/Joey CASTELAN                     FOTO  /70       **         **   Manuals    R 1st Rib Mob    MD                    R 1st Rib MWM   MD            R UT, LS & SCM Stretch   MD TE           R UT & LS STM   MD TE          Exercise Diary     UT & LS Stretches  20"x3 ea  20"x3 ea  20"x3 ea                  Corner Pec Stretch    20"x 3  20"x3 ea                  Biceps Wall Stretch    NV  20"x3 ea                  Upper Thoracic Extension Stretch                        Supine Foam Roller Series    1'x6  1'x6                  Supine AAROM Chin Tucks    5"x10  5"x10                  Prone Retraction                        Prone H ABD Modalities    [facility] C-T Spine  90/90  Pre-Tx    10'  10'

## 2022-05-05 ENCOUNTER — OFFICE VISIT (OUTPATIENT)
Dept: PHYSICAL THERAPY | Facility: REHABILITATION | Age: 72
End: 2022-05-05
Payer: MEDICARE

## 2022-05-05 DIAGNOSIS — M54.2 PAIN OF CERVICAL SPINE: Primary | ICD-10-CM

## 2022-05-05 DIAGNOSIS — M79.18 MYOFASCIAL PAIN SYNDROME: ICD-10-CM

## 2022-05-05 PROCEDURE — 97110 THERAPEUTIC EXERCISES: CPT | Performed by: PHYSICAL THERAPIST

## 2022-05-05 PROCEDURE — 97140 MANUAL THERAPY 1/> REGIONS: CPT | Performed by: PHYSICAL THERAPIST

## 2022-05-05 NOTE — PROGRESS NOTES
Daily Note     Today's date: 2022  Patient name: Army Wong  : 1950  MRN: [id number]  Referring provider: Rachel Mckeon DO  Dx:   Encounter Diagnosis     ICD-10-CM    1  Pain of cervical spine  M54 2    2  Myofascial pain syndrome  M79 18                   Subjective: Pt comes to therapy stating he continues to have stiffness and numbness on right side of cervical spine, however, notes symptoms seem to be improving  Objective: See treatment diary below      Assessment: Tolerated treatment well  Patient demonstrated fatigue post treatment, exhibited good technique with therapeutic exercises and would benefit from continued PT      Plan: Progress treatment as tolerated         Precautions: anxiety, depression, GERD, h/o gout, depression, HLD, HTN, PTSD, SVT, psoariatic arthritis       Daily Treatment Diary      Assessment  4/26  4/29  5/3  5/5               Aaron/Reval  MD                     FOTO  /70       **         **   Manuals    R 1st Rib Mob    MD                    R 1st Rib MWM   MD            R UT, LS & SCM Stretch   MD ASTON WRIGHT          R UT & LS STM   MD ASTON WRIGHT         Exercise Diary     UT & LS Stretches  20"x3 ea  20"x3 ea  20"x3 ea  20"x3 ea                Corner Pec Stretch    20"x 3  20"x3 ea  20"x3 ea                Biceps Wall Stretch    NV  20"x3 ea 20"x3 ea                Upper Thoracic Extension Stretch                        Supine Foam Roller Series    1'x6  1'x6  1'x6                Supine AAROM Chin Tucks    5"x10  5"x10  5"x10                Prone Retraction                        Prone H ABD                                                                                                                                                                                                                       Modalities    MHP C-T Spine  90/90  Pre-Tx    10'  10'

## 2022-05-10 ENCOUNTER — OFFICE VISIT (OUTPATIENT)
Dept: PHYSICAL THERAPY | Facility: REHABILITATION | Age: 72
End: 2022-05-10
Payer: MEDICARE

## 2022-05-10 DIAGNOSIS — M54.2 PAIN OF CERVICAL SPINE: Primary | ICD-10-CM

## 2022-05-10 DIAGNOSIS — M79.18 MYOFASCIAL PAIN SYNDROME: ICD-10-CM

## 2022-05-10 PROCEDURE — 97140 MANUAL THERAPY 1/> REGIONS: CPT

## 2022-05-10 PROCEDURE — 97110 THERAPEUTIC EXERCISES: CPT

## 2022-05-10 NOTE — PROGRESS NOTES
Daily Note     Today's date: 5/10/2022  Patient name: Rylie Mast  : 1950  MRN: [id number]  Referring provider: Silvia River DO  Dx:   Encounter Diagnosis     ICD-10-CM    1  Pain of cervical spine  M54 2    2  Myofascial pain syndrome  M79 18                   Subjective: pt reports with c/o increased soreness in neck since yesterday for unknown reasons  He noted soreness in upper cervical region and R UT as well  Objective: See treatment diary below      Assessment: Tolerated treatment well and without complaints  Improved flexibility and ROM post manuals and exercises  Patient demonstrated fatigue post treatment, exhibited good technique with therapeutic exercises and would benefit from continued PT      Plan: Continue per plan of care  Progress treatment as tolerated         Precautions: anxiety, depression, GERD, h/o gout, depression, HLD, HTN, PTSD, SVT, psoariatic arthritis       Daily Treatment Diary      Assessment  4/26  4/29  5/3  5/5  5/10             Eval/Joey CASTELAN                     FOTO  69/70       perf         **   Manuals    R 1st Rib Mob    MD                    R 1st Rib MWM   MD            R UT, LS & SCM Stretch   MD ASTON WRIGHT TE         R UT & LS STM   MD ASTON WRIGHT TE        Exercise Diary     UT & LS Stretches  20"x3 ea  20"x3 ea  20"x3 ea  20"x3 ea  20"x3 ea              Corner Pec Stretch    20"x 3  20"x3 ea  20"x3 ea  20"x3 ea              Biceps Wall Stretch    NV  20"x3 ea 20"x3 ea  20"x3 ea              Upper Thoracic Extension Stretch                        Supine Foam Roller Series    1'x6  1'x6  1'x6  1'x6              Supine AAROM Chin Tucks    5"x10  5"x10  5"x10  5"x10              Prone Retraction                        Prone H ABD                                                                                                                                                                                                                       Modalities    MHP C-T Spine  90/90  Pre-Tx    10'  10'  10'  10'

## 2022-05-12 ENCOUNTER — OFFICE VISIT (OUTPATIENT)
Dept: PHYSICAL THERAPY | Facility: REHABILITATION | Age: 72
End: 2022-05-12
Payer: MEDICARE

## 2022-05-12 DIAGNOSIS — M54.2 PAIN OF CERVICAL SPINE: Primary | ICD-10-CM

## 2022-05-12 DIAGNOSIS — M79.18 MYOFASCIAL PAIN SYNDROME: ICD-10-CM

## 2022-05-12 PROCEDURE — 97110 THERAPEUTIC EXERCISES: CPT

## 2022-05-12 PROCEDURE — 97140 MANUAL THERAPY 1/> REGIONS: CPT

## 2022-05-12 NOTE — PROGRESS NOTES
Daily Note     Today's date: 2022  Patient name: Edmond Patel  : 1950  MRN: [id number]  Referring provider: Sina Bowers DO  Dx:   Encounter Diagnosis     ICD-10-CM    1  Pain of cervical spine  M54 2    2  Myofascial pain syndrome  M79 18                   Subjective: Patient reports Monday his cervical pain started acting up again  He states compliance with his HEP  Objective: See treatment diary below      Assessment: Tolerated treatment well  No discomfort with outlined program  Improvement in tissue quality and ROM following manuals  Patient demonstrated fatigue post treatment, exhibited good technique with therapeutic exercises and would benefit from continued PT      Plan: Continue per plan of care        Precautions: anxiety, depression, GERD, h/o gout, depression, HLD, HTN, PTSD, SVT, psoariatic arthritis       Daily Treatment Diary      Assessment  4/26  4/29  5/3  5/5  5/10  6/10           Eval/Joey CASTELAN                     FOTO  69/70       perf         **   Manuals    R 1st Rib Mob    MD                    R 1st Rib MWM   MD            R UT, LS & SCM Stretch   MD ASTON ATWOOD        R UT & LS STM   MD ASTON ATWOOD supine and seated        Exercise Diary     UT & LS Stretches  20"x3 ea  20"x3 ea  20"x3 ea  20"x3 ea  20"x3 ea  20"x3 ea             Corner Pec Stretch    20"x 3  20"x3 ea  20"x3 ea  20"x3 ea  20"x3            Biceps Wall Stretch    NV  20"x3 ea 20"x3 ea  20"x3 ea  20"x3            Upper Thoracic Extension Stretch                        Supine Foam Roller Series    1'x6  1'x6  1'x6  1'x6  1" x6   2xT/1xY/  2xangel/1xpiston press            Supine AAROM Chin Tucks    5"x10  5"x10  5"x10  5"x10  5"x10            Prone Retraction                        Prone H ABD Modalities    MHP C-T Spine  90/90  Pre-Tx    10'  10'  10'  10'  NP

## 2022-05-17 ENCOUNTER — OFFICE VISIT (OUTPATIENT)
Dept: PHYSICAL THERAPY | Facility: REHABILITATION | Age: 72
End: 2022-05-17
Payer: MEDICARE

## 2022-05-17 DIAGNOSIS — M54.2 PAIN OF CERVICAL SPINE: Primary | ICD-10-CM

## 2022-05-17 DIAGNOSIS — M79.18 MYOFASCIAL PAIN SYNDROME: ICD-10-CM

## 2022-05-17 PROCEDURE — 97112 NEUROMUSCULAR REEDUCATION: CPT

## 2022-05-17 PROCEDURE — 97140 MANUAL THERAPY 1/> REGIONS: CPT

## 2022-05-17 PROCEDURE — 97110 THERAPEUTIC EXERCISES: CPT

## 2022-05-17 NOTE — PROGRESS NOTES
Daily Note     Today's date: 2022  Patient name: Army Wong  : 1950  MRN: [id number]  Referring provider: Rachel Mckeon DO  Dx:   Encounter Diagnosis     ICD-10-CM    1  Pain of cervical spine  M54 2    2  Myofascial pain syndrome  M79 18                   Subjective: Patient reports he is feeling good today and denies cervical pain upon arrival to tx  He states he had no issues following lv  Objective: See treatment diary below      Assessment: Tolerated treatment well with addition of scapular strengthening progressions  No increased pain or adverse symptoms reported  Slight UT soft tissue restrictions noted with STM  Will continue to monitor response to treatment and progress as tolerated  Patient demonstrated fatigue post treatment, exhibited good technique with therapeutic exercises and would benefit from continued PT      Plan: Continue per plan of care        Precautions: anxiety, depression, GERD, h/o gout, depression, HLD, HTN, PTSD, SVT, psoariatic arthritis       Daily Treatment Diary      Assessment  4/26  4/29  5/3  5/5  5/10  6/10  6/17         Aaron/Joey CASTELAN                     FOTO  69/70       perf         **   Manuals    R 1st Rib Mob    MD                    R 1st Rib MWM   MD            R UT, LS & SCM Stretch   MD ASTON WRIGHT TE RAI SC       R UT & LS STM   MD ASTON WRIGHT TE RAI supine and seated  SC      Exercise Diary     UT & LS Stretches  20"x3 ea  20"x3 ea  20"x3 ea  20"x3 ea  20"x3 ea  20"x3 ea   20"x3 ea          Corner Pec Stretch    20"x 3  20"x3 ea  20"x3 ea  20"x3 ea  20"x3  20"x3          Biceps Wall Stretch    NV  20"x3 ea 20"x3 ea  20"x3 ea  20"x3  20"x3          Upper Thoracic Extension Stretch                        Supine Foam Roller Series    1'x6  1'x6  1'x6  1'x6  1" x6   2xT/1xY/  2xangel/1xpiston press  1'x6          Supine AAROM Chin Tucks    5"x10  5"x10  5"x10  5"x10  5"x10  5"x10          Prone Retraction                        Prone H ABD              5"x10 Modalities    P C-T Spine  90/90  Pre-Tx    10'  10'  10'  10'  NP  10'

## 2022-05-19 ENCOUNTER — OFFICE VISIT (OUTPATIENT)
Dept: PHYSICAL THERAPY | Facility: REHABILITATION | Age: 72
End: 2022-05-19
Payer: MEDICARE

## 2022-05-19 DIAGNOSIS — M79.18 MYOFASCIAL PAIN SYNDROME: ICD-10-CM

## 2022-05-19 DIAGNOSIS — M54.2 PAIN OF CERVICAL SPINE: Primary | ICD-10-CM

## 2022-05-19 PROCEDURE — 97140 MANUAL THERAPY 1/> REGIONS: CPT

## 2022-05-19 PROCEDURE — 97110 THERAPEUTIC EXERCISES: CPT

## 2022-05-19 PROCEDURE — 97112 NEUROMUSCULAR REEDUCATION: CPT

## 2022-05-19 NOTE — PROGRESS NOTES
Daily Note     Today's date: 2022  Patient name: Benito Mcgee  : 1950  MRN: [id number]  Referring provider: Marylin Rogers DO  Dx:   Encounter Diagnosis     ICD-10-CM    1  Pain of cervical spine  M54 2    2  Myofascial pain syndrome  M79 18                   Subjective: Patient with no new complaints to offer, denies pain pre-tx  Objective: See treatment diary below      Assessment: Tolerated treatment well  Mod verbal and manual cues for proper completion of prone retraction  Pt tolerates all TE well with no adverse symptoms and would benefit from continued progression as tolerated  Patient demonstrated fatigue post treatment, exhibited good technique with therapeutic exercises and would benefit from continued PT      Plan: Continue per plan of care        Precautions: anxiety, depression, GERD, h/o gout, depression, HLD, HTN, PTSD, SVT, psoariatic arthritis       Daily Treatment Diary      Assessment  4/26  4/29  5/3  5/5  5/10  6/10  6/17  5/19       Eval/Revannie CASTELAN                     FOTO  69/70       perf         **   Manuals    R 1st Rib Mob    MD                    R 1st Rib MWM   MD            R UT, LS & SCM Stretch   MD TE ADRIANA TE RAI SC SC      R UT & LS STM   MD TE ADRIANA TE RAI supine and seated  SC SC     Exercise Diary     UT & LS Stretches  20"x3 ea  20"x3 ea  20"x3 ea  20"x3 ea  20"x3 ea  20"x3 ea   20"x3 ea  20"x3 ea        Corner Pec Stretch    20"x 3  20"x3 ea  20"x3 ea  20"x3 ea  20"x3  20"x3          Biceps Wall Stretch    NV  20"x3 ea 20"x3 ea  20"x3 ea  20"x3  20"x3          Upper Thoracic Extension Stretch                        Supine Foam Roller Series    1'x6  1'x6  1'x6  1'x6  1" x6   2xT/1xY/  2xangel/1xpiston press  1'x6  1'x6        Supine AAROM Chin Tucks    5"x10  5"x10  5"x10  5"x10  5"x10  5"x10  5"x10        Prone Retraction                5"x10        Prone H ABD              5"x10  5"x10 Modalities    P C-T Spine  90/90  Pre-Tx    10'  10'  10'  10'  NP  10'  10' 10'

## 2022-05-24 ENCOUNTER — OFFICE VISIT (OUTPATIENT)
Dept: PHYSICAL THERAPY | Facility: REHABILITATION | Age: 72
End: 2022-05-24
Payer: MEDICARE

## 2022-05-24 DIAGNOSIS — M54.2 PAIN OF CERVICAL SPINE: Primary | ICD-10-CM

## 2022-05-24 DIAGNOSIS — M79.18 MYOFASCIAL PAIN SYNDROME: ICD-10-CM

## 2022-05-24 PROCEDURE — 97110 THERAPEUTIC EXERCISES: CPT

## 2022-05-24 PROCEDURE — 97140 MANUAL THERAPY 1/> REGIONS: CPT

## 2022-05-24 PROCEDURE — 97112 NEUROMUSCULAR REEDUCATION: CPT

## 2022-05-24 NOTE — PROGRESS NOTES
Daily Note     Today's date: 2022  Patient name: Drake Lobo  : 1950  MRN: [id number]  Referring provider: Heather Chowdary DO  Dx:   Encounter Diagnosis     ICD-10-CM    1  Pain of cervical spine  M54 2    2  Myofascial pain syndrome  M79 18                 Subjective: Patient reports he has been feeling good with no cervical pain since lv, states his neck has not really bothered him at home  He continues to experience intermittent numbness and tingling along R cervical spine  Objective: See treatment diary below      Assessment: Tolerated treatment well  Improved performance of prone retractions this visit with decreased cues  No adverse symptoms t/o treatment, pt notes no numbness or tingling by end of session  Patient demonstrated fatigue post treatment, exhibited good technique with therapeutic exercises and would benefit from continued PT  Plan: Continue per plan of care        Precautions: anxiety, depression, GERD, h/o gout, depression, HLD, HTN, PTSD, SVT, psoariatic arthritis       Daily Treatment Diary      Assessment  4/26  4/29  5/3  5/5  5/10  6/10  6/17  5/19  5/24     Eval/Reval  MD                     FOTO  69/70       perf         **   Manuals    R 1st Rib Mob    MD                    R 1st Rib MWM   MD            R UT, LS & SCM Stretch   MD ASTON CLANCY RAI SC SC SC     R UT & LS STM   MD ASTON ATWOOD supine and seated  SC SC SC    Exercise Diary     UT & LS Stretches  20"x3 ea  20"x3 ea  20"x3 ea  20"x3 ea  20"x3 ea  20"x3 ea   20"x3 ea  20"x3 ea  HEP      Corner Pec Stretch    20"x 3  20"x3 ea  20"x3 ea  20"x3 ea  20"x3  20"x3   HEP      Biceps Wall Stretch    NV  20"x3 ea 20"x3 ea  20"x3 ea  20"x3  20"x3    HEP      Upper Thoracic Extension Stretch                  5"x10      Supine Foam Roller Series    1'x6  1'x6  1'x6  1'x6  1" x6   2xT/1xY/  2xangel/1xpiston press  1'x6  1'x6  1'x6      Supine AAROM Chin Tucks    5"x10  5"x10  5"x10  5"x10  5"x10  5"x10  5"x10  5"x10 Prone Retraction                5"x10  5"x15      Prone H ABD              5"x10  5"x10  5"x15                                                                                                                                                                                                     Modalities    MHP C-T Spine  90/90  Pre-Tx    10'  10'  10'  10'  NP  10'  10' 10'

## 2022-05-26 ENCOUNTER — OFFICE VISIT (OUTPATIENT)
Dept: PHYSICAL THERAPY | Facility: REHABILITATION | Age: 72
End: 2022-05-26
Payer: MEDICARE

## 2022-05-26 DIAGNOSIS — M54.2 PAIN OF CERVICAL SPINE: Primary | ICD-10-CM

## 2022-05-26 DIAGNOSIS — M79.18 MYOFASCIAL PAIN SYNDROME: ICD-10-CM

## 2022-05-26 PROCEDURE — 97140 MANUAL THERAPY 1/> REGIONS: CPT | Performed by: PHYSICAL THERAPIST

## 2022-05-26 PROCEDURE — 97112 NEUROMUSCULAR REEDUCATION: CPT | Performed by: PHYSICAL THERAPIST

## 2022-05-26 NOTE — PROGRESS NOTES
Daily Note     Today's date: 2022  Patient name: Ana Paula West  : 1950  MRN: [id number]  Referring provider: Mimi Uriostegui DO  Dx:   Encounter Diagnosis     ICD-10-CM    1  Pain of cervical spine  M54 2    2  Myofascial pain syndrome  M79 18                 Subjective: Pt reports he has been feeling much better since starting PT  He reports he continues to have intermittent right hand numbness, sometimes with performing his exercises at home  Pt advised to modify neck position with exercises to same position he does in PT as this does not reproduce symptoms for him  Objective: See treatment diary below  Pt issued updated HEP to which he demonstrated and verbalized understanding  Assessment: Pt with good tolerance to progression of program with addition of supine upper thoracic extension stretch  Pt required maximal verbal and manual cues for correct positioning and performance with prone scapular strengthening exercises, reported significant fatigue with completion  Will continue to progress program as able  Pt will benefit from continued skilled PT intervention in order to address his remaining limitations and to restore maximal function  Plan: Continue per plan of care        Precautions: anxiety, depression, GERD, h/o gout, depression, HLD, HTN, PTSD, SVT, psoariatic arthritis    Daily Treatment Diary      Assessment  4/26  4/29  5/3  5/5  5/10  6/10  6/17  5/19  5/24  5/26   Eval/Reval  MD                     FOTO  69/70       perf         **   Manuals    R 1st Rib Mob    MD CASTELAN    R 1st Rib MWM   MD CASTELAN    R UT, LS & SCM Stretch   MD ASTON CLANCY RAI SC SC SC  MD    R Pec Stretch in Supine              R UT & LS STM   MD ASTON CLANCY RAI supine and seated  SC SC SC  MD   Exercise Diary     UT & LS Stretches  20"x3 ea  20"x3 ea  20"x3 ea  20"x3 ea  20"x3 ea  20"x3 ea   20"x3 ea  20"x3 ea  HEP      Corner Pec Stretch    20"x 3  20"x3 ea  20"x3 ea  20"x3 ea  20"x3  20"x3   HEP Biceps Wall Stretch    NV  20"x3 ea 20"x3 ea  20"x3 ea  20"x3  20"x3    HEP      Upper Thoracic Extension Stretch - Hook-lying over rolled towel                  5"x10  30"x5    Supine Foam Roller Series    1'x6  1'x6  1'x6  1'x6  1" x6   2xT/1xY/  2xangel/1xpiston press  1'x6  1'x6  1'x6  1 min x 6  *N/T with punches/backstroke, hold at NV    Supine AAROM Chin Tucks    5"x10  5"x10  5"x10  5"x10  5"x10  5"x10  5"x10  5"x10  Pillow + Rolled Towel  10"x10    Prone Retraction                5"x10  5"x15  Manual Cues  5"  2x5    Prone H ABD              5"x10  5"x10  5"x15  Manual Cues  5"  2x5                                                                                                                                                                                                   Modalities    MHP C-T Spine  90/90  Pre-Tx    10'  10'  10'  10'  NP  10'  10' 10' 10'

## 2022-06-01 ENCOUNTER — OFFICE VISIT (OUTPATIENT)
Dept: PHYSICAL THERAPY | Facility: REHABILITATION | Age: 72
End: 2022-06-01
Payer: MEDICARE

## 2022-06-01 DIAGNOSIS — M54.2 PAIN OF CERVICAL SPINE: Primary | ICD-10-CM

## 2022-06-01 DIAGNOSIS — M79.18 MYOFASCIAL PAIN SYNDROME: ICD-10-CM

## 2022-06-01 PROCEDURE — 97112 NEUROMUSCULAR REEDUCATION: CPT | Performed by: PHYSICAL THERAPIST

## 2022-06-01 PROCEDURE — 97140 MANUAL THERAPY 1/> REGIONS: CPT | Performed by: PHYSICAL THERAPIST

## 2022-06-01 NOTE — PROGRESS NOTES
Daily Note     Today's date: 2022  Patient name: Lola Tompkins  : 1950  MRN: [id number]  Referring provider: Mitzi Hendrickson DO  Dx:   Encounter Diagnosis     ICD-10-CM    1  Pain of cervical spine  M54 2    2  Myofascial pain syndrome  M79 18                 Subjective: Pt reports he has been feeling about 50% better since starting PT  He reports he continues to have intermittent right hand numbness, usually occurs when he is sitting at the computer or watching TV  Objective: See treatment diary below  Pt issued updated HEP to which he demonstrated and verbalized understanding  Assessment: Pt with good tolerance to progression of program with addition of TB rows  Pt required maximal verbal and manual cues for correct positioning and performance with prone scapular strengthening exercises, reported significant fatigue with completion  Will continue to progress program as able  Pt will benefit from continued skilled PT intervention in order to address his remaining limitations and to restore maximal function  Plan: Continue per plan of care  Pt to f/u with referring MD for further evaluation, will resume PT after visit        Precautions: anxiety, depression, GERD, h/o gout, depression, HLD, HTN, PTSD, SVT, psoariatic arthritis    Daily Treatment Diary      Assessment 6/1  4/29  5/3  5/5  5/10  6/10  6/17  5/19  5/24  5/26   Eval/Reval                      FOTO        perf         **   Manuals    R 1st Rib Mob MD MD CASTELAN    R 1st Rib MWM MD MD CASTELAN    R UT, LS & SCM Stretch MD MD ASTON Mendes RAI SC SC SC  MD    R Pec Stretch in Supine MD Shane CLANCY RAI supine and seated  SC SC SC  MD   Exercise Diary     UT & LS Stretches   20"x3 ea  20"x3 ea  20"x3 ea  20"x3 ea  20"x3 ea   20"x3 ea  20"x3 ea  HEP      Corner Pec Stretch   20"x 3  20"x3 ea  20"x3 ea  20"x3 ea  20"x3  20"x3   HEP      Biceps Wall Stretch   NV  20"x3 ea 20"x3 ea  20"x3 ea  20"x3 20"x3    HEP      Upper Thoracic Extension Stretch - Hook-lying over rolled towel 30"x5                5"x10  30"x5    Supine Foam Roller Series 1 min x 4 - help punches/backstroke  1'x6  1'x6  1'x6  1'x6  1" x6   2xT/1xY/  2xangel/1xpiston press  1'x6  1'x6  1'x6  1 min x 6  *N/T with punches/backstroke, hold at NV    Supine AAROM Chin Tucks 1 min x 6  *N/T with punches/backstroke, hold at NV  5"x10  5"x10  5"x10  5"x10  5"x10  5"x10  5"x10  5"x10  Pillow + Rolled Towel  10"x10    Prone Retraction Manual Cues  5"  3x5              5"x10  5"x15  Manual Cues  5"  2x5    Prone H ABD Manual Cues  5"  3x5            5"x10  5"x10  5"x15  Manual Cues  5"  2x5    TB Rows Blue  5"x10                                                                                                                                                                                      Modalities    MHP C-T Spine  90/90  Pre-Tx 10'  10'  10'  10'  10'  NP  10'  10' 10' 10'

## 2022-06-29 ENCOUNTER — OFFICE VISIT (OUTPATIENT)
Dept: PAIN MEDICINE | Facility: MEDICAL CENTER | Age: 72
End: 2022-06-29
Payer: MEDICARE

## 2022-06-29 ENCOUNTER — APPOINTMENT (OUTPATIENT)
Dept: RADIOLOGY | Facility: MEDICAL CENTER | Age: 72
End: 2022-06-29
Payer: MEDICARE

## 2022-06-29 VITALS
SYSTOLIC BLOOD PRESSURE: 114 MMHG | WEIGHT: 144 LBS | TEMPERATURE: 98.3 F | BODY MASS INDEX: 20.62 KG/M2 | DIASTOLIC BLOOD PRESSURE: 70 MMHG | OXYGEN SATURATION: 98 % | HEART RATE: 79 BPM | HEIGHT: 70 IN

## 2022-06-29 DIAGNOSIS — M54.12 CERVICAL RADICULOPATHY: ICD-10-CM

## 2022-06-29 DIAGNOSIS — M54.2 NECK PAIN: ICD-10-CM

## 2022-06-29 DIAGNOSIS — M54.2 NECK PAIN: Primary | ICD-10-CM

## 2022-06-29 PROCEDURE — 99213 OFFICE O/P EST LOW 20 MIN: CPT | Performed by: NURSE PRACTITIONER

## 2022-06-29 PROCEDURE — 72050 X-RAY EXAM NECK SPINE 4/5VWS: CPT

## 2022-06-29 RX ORDER — GABAPENTIN 300 MG/1
300 CAPSULE ORAL
Qty: 30 CAPSULE | Refills: 0 | Status: SHIPPED | OUTPATIENT
Start: 2022-06-29

## 2022-06-29 NOTE — PROGRESS NOTES
Assessment  1  Neck pain - Right    2  Cervical radiculopathy        Plan  Initiate gabapentin for the numbness and tingling the patient is experiencing on the right side of his neck and into his right arm  He will take 300 mg, 1 tablet at bedtime  He was educated on the most common side effects which are drowsiness, dizziness, blurry vision swelling of the hands and feet  Will also obtain a cervical spine x-ray will call him with results  He may require a cervical spine MRI in the future  Follow-up in 4 weeks to review new medication        My impressions and treatment recommendations were discussed in detail with the patient who verbalized understanding and had no further questions  Discharge instructions were provided  I personally saw and examined the patient and I agree with the above discussed plan of care  Orders Placed This Encounter   Procedures    XR spine cervical complete 4 or 5 vw non injury     Standing Status:   Future     Number of Occurrences:   1     Standing Expiration Date:   6/29/2026     Scheduling Instructions:      Bring along any outside films relating to this procedure  New Medications Ordered This Visit   Medications    gabapentin (NEURONTIN) 300 mg capsule     Sig: Take 1 capsule (300 mg total) by mouth daily at bedtime     Dispense:  30 capsule     Refill:  0       History of Present Illness    Grace Thomson is a 74 y o  male presents for follow-up related to his right-sided neck pain  Today he rates his pain 5/10 this is intermittent most bothersome at night and described as dull, aching, numbness, and pins and needles  He does tell me that he does get pain into his right arm  Patient did complete physical therapy, he has been participating since April 26th through June 1st and he tells me he did not receive any improvement in his pain symptoms      I have personally reviewed and/or updated the patient's past medical history, past surgical history, family history, social history, current medications, allergies, and vital signs today  Review of Systems   Respiratory: Negative for shortness of breath  Cardiovascular: Negative for chest pain  Gastrointestinal: Negative for constipation, diarrhea, nausea and vomiting  Musculoskeletal: Positive for myalgias, neck pain and neck stiffness  Negative for arthralgias, gait problem and joint swelling  Skin: Negative for rash  Neurological: Negative for dizziness, seizures and weakness  All other systems reviewed and are negative        Patient Active Problem List   Diagnosis    PSVT (paroxysmal supraventricular tachycardia) ([facility] [address] )    ED (erectile dysfunction) of organic origin    Hypercalcemia    Lipid disorder    Nephrolithiasis    Osteopenia of multiple sites    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    Inflammatory arthritis    Preop examination    Vitamin D deficiency    Refractive amblyopia    Retinal dot hemorrhage    Presbyopia    Polyp of colon    Nonexudative senile macular degeneration of retina    Macular drusen    Insomnia    Essential hypertension    Mixed hyperlipidemia    Ganglion    Depressive disorder    Combined form of senile cataract    Chronic bronchitis (HCC)    Asthma    Allergic rhinitis    Allergic conjunctivitis    Alcohol abuse    Suicidal ideation    Seborrheic dermatitis    Bilateral carotid artery stenosis    Gastroesophageal reflux disease    Benign prostatic hyperplasia with weak urinary stream    History of hyperparathyroidism    Acute pain of right shoulder    Healthcare maintenance    Psoriasis    Bilateral impacted cerumen    Psoriatic arthritis (HCC)    Hyperglycemia    Chronic pain of right knee    PTSD (post-traumatic stress disorder)       Past Medical History:   Diagnosis Date    Anxiety     Arthritis     Depression     GERD (gastroesophageal reflux disease)     Gout     Hyperlipidemia     Hypertension     Kidney stone     P[facility]atic arthritis ([facility] [address] )     PTSD (post-traumatic stress disorder)     SVT (supraventricular tachycardia) (HCC)     Thyroid tumor, benign        Past Surgical History:   Procedure Laterality Date    CARDIAC ELECTROPHYSIOLOGY MAPPING AND ABLATION      SVT    PARATHYROIDECTOMY      PROSTATE SURGERY         Family History   Problem Relation Age of Onset    Coronary artery disease Brother     Hypertension Mother     No Known Problems Father        Social History     Occupational History    Not on file   Tobacco Use    Smoking status: Never Smoker    Smokeless tobacco: Never Used   Vaping Use    Vaping Use: Never used   Substance and Sexual Activity    Alcohol use: No    Drug use: No    Sexual activity: Not Currently       Current Outpatient Medications on File Prior to Visit   Medication Sig    albuterol (PROVENTIL HFA,VENTOLIN HFA) 90 mcg/act inhaler Inhale 2 puffs every 6 (six) hours as needed for wheezing or shortness of breath    atorvastatin (LIPITOR) 40 mg tablet Take 40 mg by mouth     Carboxymethylcellulose Sod PF 0 25 % SOLN INSTILL ONE DROP BOTH EYES FOUR TIMES A DAY FOR DRY EYES    Cholecalciferol 2000 units CAPS Take 2,000 Units by mouth    dextran 70-hypromellose (GENTEAL TEARS) 0 1-0 3 % ophthalmic solution Apply 1 drop to eye    ketotifen (ZADITOR) 0 025 % ophthalmic solution INSTILL ONE DROP BOTH EYES TWICE A DAY    leflunomide (ARAVA) 20 MG tablet Take 1 tablet (20 mg total) by mouth daily    polyvinyl alcohol (LIQUIFILM TEARS) 1 4 % ophthalmic solution Apply 1 drop to eye    sertraline (ZOLOFT) 100 mg tablet Take 100 mg by mouth daily     SODIUM FLUORIDE, DENTAL GEL, 1 1 % GEL APPLY SMALL AMOUNT TO BY MOUTH DAILY    sulfaSALAzine (AZULFIDINE) 500 mg tablet Take 2 tabs twice a day    triamcinolone (KENALOG) 0 1 % cream Apply topically 2 (two) times a day    azelastine (ASTELIN) 0 1 % nasal spray 1 spray into each nostril 2 (two) times a day Use in each nostril as directed (Patient not taking: Reported on 4/22/2022 )    cetirizine (ZyrTEC) 10 mg tablet Take 1 tablet (10 mg total) by mouth daily (Patient not taking: Reported on 1/18/2022 )    tiZANidine (ZANAFLEX) 2 mg tablet Take 1 tablet (2 mg total) by mouth every 8 (eight) hours as needed for muscle spasms (Patient not taking: Reported on 4/22/2022 )     No current facility-administered medications on file prior to visit  Allergies   Allergen Reactions    Cefadroxil Other (See Comments)     bleeding internally    Midazolam Other (See Comments)     ARRHYTHMIA    Prazosin     Fentanyl Palpitations       Physical Exam    /70   Pulse 79   Temp 98 3 °F (36 8 °C)   Ht 5' 10" (1 778 m)   Wt 65 3 kg (144 lb)   SpO2 98%   BMI 20 66 kg/m²     Constitutional: normal, well developed, well nourished, alert, in no distress and non-toxic and no overt pain behavior    Eyes: anicteric  HEENT: grossly intact  Neck: supple, symmetric, trachea midline and no masses   Pulmonary:even and unlabored  Cardiovascular:No edema or pitting edema present  Skin:Normal without rashes or lesions and well hydrated  Psychiatric:Mood and affect appropriate  Neurologic:Cranial Nerves II-XII grossly intact  Musculoskeletal:normal    Imaging

## 2022-07-18 ENCOUNTER — OFFICE VISIT (OUTPATIENT)
Dept: RHEUMATOLOGY | Facility: CLINIC | Age: 72
End: 2022-07-18
Payer: MEDICARE

## 2022-07-18 ENCOUNTER — APPOINTMENT (OUTPATIENT)
Dept: LAB | Facility: MEDICAL CENTER | Age: 72
End: 2022-07-18
Payer: MEDICARE

## 2022-07-18 VITALS
BODY MASS INDEX: 20.47 KG/M2 | HEIGHT: 70 IN | WEIGHT: 143 LBS | SYSTOLIC BLOOD PRESSURE: 127 MMHG | DIASTOLIC BLOOD PRESSURE: 79 MMHG

## 2022-07-18 DIAGNOSIS — Z79.899 HIGH RISK MEDICATION USE: ICD-10-CM

## 2022-07-18 DIAGNOSIS — L40.9 PSORIASIS: ICD-10-CM

## 2022-07-18 DIAGNOSIS — E78.2 MIXED HYPERLIPIDEMIA: ICD-10-CM

## 2022-07-18 DIAGNOSIS — I10 ESSENTIAL HYPERTENSION: ICD-10-CM

## 2022-07-18 DIAGNOSIS — R73.9 HYPERGLYCEMIA: ICD-10-CM

## 2022-07-18 DIAGNOSIS — L40.50 PSORIATIC ARTHRITIS (HCC): Primary | ICD-10-CM

## 2022-07-18 PROBLEM — E44.1 MILD PROTEIN-CALORIE MALNUTRITION (HCC): Status: ACTIVE | Noted: 2022-07-18

## 2022-07-18 LAB
ALBUMIN SERPL BCP-MCNC: 3.7 G/DL (ref 3.5–5)
ALP SERPL-CCNC: 62 U/L (ref 46–116)
ALT SERPL W P-5'-P-CCNC: 30 U/L (ref 12–78)
ANION GAP SERPL CALCULATED.3IONS-SCNC: 1 MMOL/L (ref 4–13)
AST SERPL W P-5'-P-CCNC: 29 U/L (ref 5–45)
BASOPHILS # BLD AUTO: 0.04 THOUSANDS/ΜL (ref 0–0.1)
BASOPHILS NFR BLD AUTO: 1 % (ref 0–1)
BILIRUB SERPL-MCNC: 0.44 MG/DL (ref 0.2–1)
BUN SERPL-MCNC: 16 MG/DL (ref 5–25)
CALCIUM SERPL-MCNC: 9.4 MG/DL (ref 8.3–10.1)
CHLORIDE SERPL-SCNC: 109 MMOL/L (ref 96–108)
CO2 SERPL-SCNC: 31 MMOL/L (ref 21–32)
CREAT SERPL-MCNC: 0.8 MG/DL (ref 0.6–1.3)
CRP SERPL QL: <3 MG/L
EOSINOPHIL # BLD AUTO: 0.31 THOUSAND/ΜL (ref 0–0.61)
EOSINOPHIL NFR BLD AUTO: 7 % (ref 0–6)
ERYTHROCYTE [DISTWIDTH] IN BLOOD BY AUTOMATED COUNT: 12 % (ref 11.6–15.1)
ERYTHROCYTE [SEDIMENTATION RATE] IN BLOOD: 9 MM/HOUR (ref 0–19)
GFR SERPL CREATININE-BSD FRML MDRD: 88 ML/MIN/1.73SQ M
GLUCOSE SERPL-MCNC: 71 MG/DL (ref 65–140)
HCT VFR BLD AUTO: 43.1 % (ref 36.5–49.3)
HGB BLD-MCNC: 13.8 G/DL (ref 12–17)
IMM GRANULOCYTES # BLD AUTO: 0.01 THOUSAND/UL (ref 0–0.2)
IMM GRANULOCYTES NFR BLD AUTO: 0 % (ref 0–2)
LYMPHOCYTES # BLD AUTO: 1.41 THOUSANDS/ΜL (ref 0.6–4.47)
LYMPHOCYTES NFR BLD AUTO: 31 % (ref 14–44)
MCH RBC QN AUTO: 32.1 PG (ref 26.8–34.3)
MCHC RBC AUTO-ENTMCNC: 32 G/DL (ref 31.4–37.4)
MCV RBC AUTO: 100 FL (ref 82–98)
MONOCYTES # BLD AUTO: 0.7 THOUSAND/ΜL (ref 0.17–1.22)
MONOCYTES NFR BLD AUTO: 15 % (ref 4–12)
NEUTROPHILS # BLD AUTO: 2.13 THOUSANDS/ΜL (ref 1.85–7.62)
NEUTS SEG NFR BLD AUTO: 46 % (ref 43–75)
NRBC BLD AUTO-RTO: 0 /100 WBCS
PLATELET # BLD AUTO: 167 THOUSANDS/UL (ref 149–390)
PMV BLD AUTO: 12.2 FL (ref 8.9–12.7)
POTASSIUM SERPL-SCNC: 4 MMOL/L (ref 3.5–5.3)
PROT SERPL-MCNC: 7.1 G/DL (ref 6.4–8.4)
RBC # BLD AUTO: 4.3 MILLION/UL (ref 3.88–5.62)
SODIUM SERPL-SCNC: 141 MMOL/L (ref 135–147)
WBC # BLD AUTO: 4.6 THOUSAND/UL (ref 4.31–10.16)

## 2022-07-18 PROCEDURE — 99215 OFFICE O/P EST HI 40 MIN: CPT | Performed by: INTERNAL MEDICINE

## 2022-07-18 PROCEDURE — 85652 RBC SED RATE AUTOMATED: CPT | Performed by: INTERNAL MEDICINE

## 2022-07-18 PROCEDURE — 80053 COMPREHEN METABOLIC PANEL: CPT | Performed by: INTERNAL MEDICINE

## 2022-07-18 PROCEDURE — 85025 COMPLETE CBC W/AUTO DIFF WBC: CPT | Performed by: INTERNAL MEDICINE

## 2022-07-18 PROCEDURE — 36415 COLL VENOUS BLD VENIPUNCTURE: CPT | Performed by: INTERNAL MEDICINE

## 2022-07-18 PROCEDURE — 86140 C-REACTIVE PROTEIN: CPT | Performed by: INTERNAL MEDICINE

## 2022-07-18 RX ORDER — LEFLUNOMIDE 20 MG/1
20 TABLET ORAL DAILY
Qty: 90 TABLET | Refills: 1 | Status: SHIPPED | OUTPATIENT
Start: 2022-07-18

## 2022-07-18 RX ORDER — SULFASALAZINE 500 MG/1
TABLET ORAL
Qty: 360 TABLET | Refills: 1 | Status: SHIPPED | OUTPATIENT
Start: 2022-07-18

## 2022-07-18 RX ORDER — TRIAMCINOLONE ACETONIDE 1 MG/G
CREAM TOPICAL 2 TIMES DAILY
Qty: 80 G | Refills: 3 | Status: SHIPPED | OUTPATIENT
Start: 2022-07-18

## 2022-07-18 NOTE — PROGRESS NOTES
Assessment and Plan: Angel Capone is a 74 y o   male who presents for follow-up of his psoriatic arthritis, which is under control  Having some cervicalgia symptoms involving right side of neck; on gabapentin and doing PT, following with pain management  Do labs  Continue leflunomide 20mg daily  Continue sulfasalazine 2 tabs twice a day  Continue triamcinolone cream as needed for psoriasis    Return to clinic in 6 months    Plan:   Diagnoses and all orders for this visit:    Psoriatic arthritis ([facility] [address] )  -     CBC and differential  -     Comprehensive metabolic panel  -     C-reactive protein  -     Sedimentation rate, automated  -     sulfaSALAzine (AZULFIDINE) 500 mg tablet; Take 2 tabs twice a day  -     leflunomide (ARAVA) 20 MG tablet; Take 1 tablet (20 mg total) by mouth daily    Psoriasis  -     triamcinolone (KENALOG) 0 1 % cream; Apply topically 2 (two) times a day    High risk medication use  -     CBC and differential  -     Comprehensive metabolic panel   High risk medication use - Benefits and risks of leflunomide use, including but not limited to gastrointestinal disturbances such as nausea, diarrhea, stomatitis, hair loss, fatigue, leukopenia, hepatotoxicity were discussed with the patient  CBC, CMP will be monitored regularly  Follow-up plan: Return to clinic in 6 months        Rheumatic Disease Summary  Mary Grace Farmers a 74 y  o  male who originally presented on 4/2/20 as a Rheumatology consult referred by his Bautista Burnette MD for evaluation of possible inflammatory arthritis involving the hands  Patient's clinical picture of history of asymmetric painful and swollen joints of hands, and psoriasis seemed consistent with psoriatic arthritis  His DAS28 score at initial visit was 3 32, consistent with moderate inflammatory disease activity   However, since patient's arthritis symptoms were not bothering him at the time, decided to conservatively approach treatment with diclofenac gel to apply to painful joints as needed  For his psoriasis, mainly involving his head, he can continue his current steroid ointment as needed  Ordered inflammatory markers and HLA-B27 for psoriatic arthritis work-up; HLA-B27 returned negative but his ESR was slightly elevated  He next presented on 7/14/20 for follow-up of likely psoriatic arthritis  Ordered [facility] hand ultrasound to evaluate for extent of inflammatory arthritis changes, which showed significant psoriatic arthritis changes  Continued diclofenac gel prn for joint pain, and Kenalog ointment prn for psoriasis  Started him on methotrexate 10mg po weekly with daily folic acid  He followed up in 12/2020 with Dr Cady Spencer, who increased his methotrexate to 15mg po weekly with daily folic acid  4/22/21: Psoriatic arthritis is not controlled  Patient self-discontinued methotrexate 2-3 weeks ago, since it was not helping his psoriasis or arthritis symptoms  Prescribed clobetasol cream for him to apply to his psoriasis as needed  Reminded patient to schedule dermatology appointment  To address his psoriatic arthritis symptoms, initiated patient on sulfasalazine to be ultimately increased to the therapeutic dose of 1,000 mg p o  b i d   Patient will also benefit from the addition of Tl Dodson for management of both his psoriasis and psoriatic arthritis; worked on prior [employer] but it was denied  7/20/21:   Do labs now and before next clinic visit  Stop clobetasol cream, can use triamcinolone cream as needed for psoriasis  Continue sulfasalazine 2 tabs twice a day  Increase leflunomide to 20mg daily    1/18/22: Patient has a little of rash on his chloé, nasolabial folds which is less prominent then before  Patient says Kenalog cream works very well for him  He has minimal pain in the right thumb also has minimal knee pain bilaterally  Patient has no complains  Patient reports has no problems taking his leflunomide and sulfasalazine   Patient has no recent lab work done  His leflunomide dose was increased last time to 20 mg daily  He continued on sulfasalazine 1000 mg bid  He switched from clobetazole to triamcinolone cream which has a very good effect  There is a plan to start patient Thelma Meza, but this was previously declined by insurance        HPI  Mabel Hogan is a 74 y o   male who presents for follow-up of his psoriatic arthritis  Last clinic visit was 1/18/22  Doing well overall; has some neck pain  The following portions of the patient's history were reviewed and updated as appropriate: allergies, current medications, past family history, past medical history, past social history, past surgical history and problem list     Review of Systems:   Review of Systems   Constitutional: Negative for fatigue  HENT: Negative for mouth sores  Eyes: Negative for pain  Respiratory: Negative for shortness of breath  Cardiovascular: Negative for leg swelling  Musculoskeletal: Positive for neck pain  Negative for arthralgias and joint swelling  Skin: Negative for rash  Neurological: Negative for weakness  Hematological: Negative for adenopathy  Psychiatric/Behavioral: Negative for sleep disturbance         Home Medications:    Current Outpatient Medications:     albuterol (PROVENTIL HFA,VENTOLIN HFA) 90 mcg/act inhaler, Inhale 2 puffs every 6 (six) hours as needed for wheezing or shortness of breath, Disp: 8 g, Rfl: 0    atorvastatin (LIPITOR) 40 mg tablet, Take 40 mg by mouth , Disp: , Rfl:     Carboxymethylcellulose Sod PF 0 25 % SOLN, INSTILL ONE DROP BOTH EYES FOUR TIMES A DAY FOR DRY EYES, Disp: , Rfl:     Cholecalciferol 2000 units CAPS, Take 2,000 Units by mouth, Disp: , Rfl:     dextran 70-hypromellose (GENTEAL TEARS) 0 1-0 3 % ophthalmic solution, Apply 1 drop to eye, Disp: , Rfl:     gabapentin (NEURONTIN) 300 mg capsule, Take 1 capsule (300 mg total) by mouth daily at bedtime, Disp: 30 capsule, Rfl: 0    ketotifen (ZADITOR) 0 025 % ophthalmic solution, INSTILL ONE DROP BOTH EYES TWICE A DAY, Disp: , Rfl:     leflunomide (ARAVA) 20 MG tablet, Take 1 tablet (20 mg total) by mouth daily, Disp: 90 tablet, Rfl: 1    polyvinyl alcohol (LIQUIFILM TEARS) 1 4 % ophthalmic solution, Apply 1 drop to eye, Disp: , Rfl:     sertraline (ZOLOFT) 100 mg tablet, Take 100 mg by mouth daily , Disp: , Rfl:     SODIUM FLUORIDE, DENTAL GEL, 1 1 % GEL, APPLY SMALL AMOUNT TO BY MOUTH DAILY, Disp: , Rfl:     sulfaSALAzine (AZULFIDINE) 500 mg tablet, Take 2 tabs twice a day, Disp: 360 tablet, Rfl: 1    triamcinolone (KENALOG) 0 1 % cream, Apply topically 2 (two) times a day, Disp: 80 g, Rfl: 3    azelastine (ASTELIN) 0 1 % nasal spray, 1 spray into each nostril 2 (two) times a day Use in each nostril as directed, Disp: 1 mL, Rfl: 0    cetirizine (ZyrTEC) 10 mg tablet, Take 1 tablet (10 mg total) by mouth daily, Disp: 30 tablet, Rfl: 0    tiZANidine (ZANAFLEX) 2 mg tablet, Take 1-2 PO HS PRN for pain and spasms  , Disp: 60 tablet, Rfl: 2    Objective:    Vitals:    07/18/22 0952   BP: 127/79   Weight: 64 9 kg (143 lb)   Height: 5' 10" (1 778 m)       Physical Exam  Constitutional:       General: He is not in acute distress  HENT:      Head: Normocephalic and atraumatic  Eyes:      Conjunctiva/sclera: Conjunctivae normal    Cardiovascular:      Rate and Rhythm: Normal rate and regular rhythm  Heart sounds: S1 normal and S2 normal      No friction rub  Pulmonary:      Effort: Pulmonary effort is normal  No respiratory distress  Breath sounds: Normal breath sounds  No wheezing, rhonchi or rales  Musculoskeletal:      Cervical back: Neck supple  Skin:     Coloration: Skin is not pale  Neurological:      Mental Status: He is alert  Mental status is at baseline  Psychiatric:         Mood and Affect: Mood normal          Behavior: Behavior normal        Reviewed labs and imaging      Imaging:   XR lumbar spine 6/29/22  Mild right C3-C4 foraminal narrowing  DXA scan 1/24/22  RESULTS:   LUMBAR SPINE:  L1-L4:  BMD 1 155 gm/cm2  T-score 0 6 and on the prior study the T score was noted to be -0 3  Z-score 1 6     LEFT TOTAL HIP:  BMD 1 022 gm/cm2  T-score -0 1 and on the prior study the T score was -0 4  Z-score 0 7     LEFT FEMORAL NECK:  BMD 0 739 gm/cm2  T-score -1 4 and on the prior study the T score was -1 6   Z-score -0 1     The left forearm BMD is 0 795 and the T score is -0 4 and on the prior study the T score was -1 6  Z score is 1 1      IMPRESSION:  1  Based on the [facility] classification, this study identifies a diagnosis of low bone mass, notable at the femoral neck area and the patient is considered at low risk for fracture  CXR 4/28/21  No acute cardiopulmonary disease  No radiographic evidence of active TB  Left Knee x-rays 3/23/21  There is a small joint effusion  Mild osteoarthritis with narrowing of the medial tibiofemoral joint and small osteophytes seen  [facility] Bilateral Hand Ultrasound 7/23/20  IMPRESSION:  There is evidence of inflammatory arthritis in the following joints:  Right 1st metacarpophalangeal joint: Mild synovial hypertrophy  Right 2nd metacarpophalangeal joint: Moderate synovial hypertrophy and mild synovial hyperemia  Right 2nd PIP joint: Mild synovial hypertrophy, moderate synovial hyperemia, small joint effusion  Right 3rd metacarpophalangeal joint: Mild synovial hypertrophy, severe synovial hyperemia  Left 2nd metacarpophalangeal joint: Mild synovial hypertrophy  The asymmetric, distal pattern is in favor of psoriatic arthritis  Right Hand x-rays 1/13/20  IMPRESSION:  Changes of arthritis most advanced at the 2nd DIP joint, and periarticular soft tissue swelling particularly the 2nd finger   Soft tissue swelling findings are suspicious for an inflammatory arthropathy      Labs:   Office Visit on 07/18/2022   Component Date Value Ref Range Status    WBC 07/18/2022 4 60  4 31 - 10 16 Thousand/uL Final    RBC 07/18/2022 4 30  3 88 - 5 62 Million/uL Final    Hemoglobin 07/18/2022 13 8  12 0 - 17 0 g/dL Final    Hematocrit 07/18/2022 43 1  36 5 - 49 3 % Final    MCV 07/18/2022 100 (A) 82 - 98 fL Final    MCH 07/18/2022 32 1  26 8 - 34 3 pg Final    MCHC 07/18/2022 32 0  31 4 - 37 4 g/dL Final    RDW 07/18/2022 12 0  11 6 - 15 1 % Final    MPV 07/18/2022 12 2  8 9 - 12 7 fL Final    Platelets 58/87/3668 167  149 - 390 Thousands/uL Final    nRBC 07/18/2022 0  /100 WBCs Final    Neutrophils Relative 07/18/2022 46  43 - 75 % Final    Immat GRANS % 07/18/2022 0  0 - 2 % Final    Lymphocytes Relative 07/18/2022 31  14 - 44 % Final    Monocytes Relative 07/18/2022 15 (A) 4 - 12 % Final    Eosinophils Relative 07/18/2022 7 (A) 0 - 6 % Final    Basophils Relative 07/18/2022 1  0 - 1 % Final    Neutrophils Absolute 07/18/2022 2 13  1 85 - 7 62 Thousands/µL Final    Immature Grans Absolute 07/18/2022 0 01  0 00 - 0 20 Thousand/uL Final    Lymphocytes Absolute 07/18/2022 1 41  0 60 - 4 47 Thousands/µL Final    Monocytes Absolute 07/18/2022 0 70  0 17 - 1 22 Thousand/µL Final    Eosinophils Absolute 07/18/2022 0 31  0 00 - 0 61 Thousand/µL Final    Basophils Absolute 07/18/2022 0 04  0 00 - 0 10 Thousands/µL Final    Sodium 07/18/2022 141  135 - 147 mmol/L Final    Potassium 07/18/2022 4 0  3 5 - 5 3 mmol/L Final    Chloride 07/18/2022 109 (A) 96 - 108 mmol/L Final    CO2 07/18/2022 31  21 - 32 mmol/L Final    ANION GAP 07/18/2022 1 (A) 4 - 13 mmol/L Final    BUN 07/18/2022 16  5 - 25 mg/dL Final    Creatinine 07/18/2022 0 80  0 60 - 1 30 mg/dL Final    Standardized to IDMS reference method    Glucose 07/18/2022 71  65 - 140 mg/dL Final    If the patient is fasting, the ADA then defines impaired fasting glucose as > 100 mg/dL and diabetes as > or equal to 123 mg/dL  Specimen collection should occur prior to Sulfasalazine administration due to the potential for falsely depressed results   Specimen collection should occur prior to Sulfapyridine administration due to the potential for falsely elevated results   Calcium 07/18/2022 9 4  8 3 - 10 1 mg/dL Final    AST 07/18/2022 29  5 - 45 U/L Final    Specimen collection should occur prior to Sulfasalazine administration due to the potential for falsely depressed results   ALT 07/18/2022 30  12 - 78 U/L Final    Specimen collection should occur prior to Sulfasalazine and/or Sulfapyridine administration due to the potential for falsely depressed results   Alkaline Phosphatase 07/18/2022 62  46 - 116 U/L Final    Total Protein 07/18/2022 7 1  6 4 - 8 4 g/dL Final    Albumin 07/18/2022 3 7  3 5 - 5 0 g/dL Final    Total Bilirubin 07/18/2022 0 44  0 20 - 1 00 mg/dL Final    Use of this assay is not recommended for patients undergoing treatment with eltrombopag due to the potential for falsely elevated results      eGFR 07/18/2022 88  ml/min/1 73sq m Final    CRP 07/18/2022 <3 0  <3 0 mg/L Final    Sed Rate 07/18/2022 9  0 - 19 mm/hour Final

## 2022-07-18 NOTE — PATIENT INSTRUCTIONS
Do labs  Continue leflunomide 20mg daily  Continue sulfasalazine 2 tabs twice a day  Continue triamcinolone cream as needed for psoriasis    Return to clinic in 6 months    Psoriatic Arthritis in Adults    What is psoriatic arthritis? -- Psoriatic arthritis is a condition that causes joint pain, swelling, and stiffness  It happens in people who have a long-term skin condition called psoriasis  People with psoriasis have patches of thick, red skin that are often covered by silver or white scales  Doctors don't know what causes psoriasis or psoriatic arthritis  What are the symptoms of psoriatic arthritis? -- Psoriatic arthritis causes pain, stiffness, and swelling in the affected joints  It can also affect the spine in some people  Because of the joint and spine problems, people can have trouble moving their body  Stiffness in the joints or low back is usually worse in the morning and lasts 30 minutes or longer  It usually gets better with exercise  Psoriatic arthritis can affect joints on one or both sides of the body  It usually affects more than one joint  In addition to joint symptoms (and the skin symptoms of psoriasis), people sometimes have other symptoms  These can include:  ? Swelling of a finger or toe, or the hands or feet  ? Swelling and pain in the back of the ankle or in the heel   ? Nail symptoms - The nails can look "pitted," as if they were pricked by a pin  The nail can also come up off the nail bed  ? Eye pain or redness  Is there a test for psoriatic arthritis? -- Yes  Your doctor or nurse will ask about your symptoms and do an exam  He or she will order X-rays of your painful joints  He or she might order an imaging test called an MRI  Imaging tests create pictures of the inside of the body  To check that another condition isn't causing your symptoms, your doctor or nurse might also order:  ?Blood tests  ? Lab tests on a sample of fluid from a swollen joint - To get a sample of fluid, the doctor will put a thin needle in your joint  How is psoriatic arthritis treated? -- There is no cure for psoriatic arthritis, but different treatments can help ease and control symptoms  Treatment for joint symptoms usually involves one or more of the following:  ?Medicines called nonsteroidal antiinflammatory drugs, or "NSAIDs" for short - Examples of NSAIDs are aspirin, ibuprofen (sample brand names: Advil, Motrin), and naproxen (sample brand name: Aleve)  ? Medicines that are usually used to treat other types of arthritis - Some of these include methotrexate and leflunomide  ? Medicines that block a substance called tumor necrosis factor, or "TNF" for short - TNF plays a role in psoriasis and psoriatic arthritis  Medicines that block TNF are called "anti-TNF" medicines  Examples include etanercept (brand name: Enbrel) and adalimumab (brand name: Humira)  ?Other medicines - If the options above don't help, your doctor might suggest trying a different medicine  Examples include ustekinumab (brand name: Felipa Egan), secukinumab (brand name: Cosentyx), tofacitinib (brand name: Noemi Espinal), abatacept (brand name: Namrata Barajas), and apremilast (brand name: Sae Sung)  ? Shots of medicines called steroids that go into the painful joint - These are not the same as the steroids some athletes take illegally  These steroids help reduce swelling and pain  ? Heat - Heat, especially in the morning, can help reduce pain and stiffness  Do not use heat for longer than 20 minutes at a time  Also, do not use anything too hot that could burn your skin  ? Physical and occupational therapy - This involves learning exercises, movements, and ways of doing everyday tasks  ? Special shoe inserts (called "orthotics") - These can help keep your feet, ankles, and knees in the proper position  ?Treatment for psoriatic arthritis is usually long term  That's because even after symptoms get better, they sometimes return later on    Is there anything I can do on my own to feel better? -- Yes  It is very important that you stay active  You might want to avoid being active because you are in pain  But this can make things worse  It can make your muscles weak and your joints stiffer than they already are  Your doctor, nurse, or physical therapist can help you figure out which activities and exercises are right for you

## 2022-08-10 ENCOUNTER — OFFICE VISIT (OUTPATIENT)
Dept: PAIN MEDICINE | Facility: MEDICAL CENTER | Age: 72
End: 2022-08-10
Payer: MEDICARE

## 2022-08-10 VITALS
OXYGEN SATURATION: 96 % | BODY MASS INDEX: 20.76 KG/M2 | TEMPERATURE: 97.8 F | HEIGHT: 70 IN | WEIGHT: 145 LBS | HEART RATE: 93 BPM | DIASTOLIC BLOOD PRESSURE: 68 MMHG | SYSTOLIC BLOOD PRESSURE: 126 MMHG

## 2022-08-10 DIAGNOSIS — M54.2 NECK PAIN: ICD-10-CM

## 2022-08-10 DIAGNOSIS — G89.4 CHRONIC PAIN SYNDROME: Primary | ICD-10-CM

## 2022-08-10 DIAGNOSIS — M79.18 MYOFASCIAL PAIN SYNDROME: ICD-10-CM

## 2022-08-10 DIAGNOSIS — M54.12 CERVICAL RADICULOPATHY: ICD-10-CM

## 2022-08-10 PROCEDURE — 99214 OFFICE O/P EST MOD 30 MIN: CPT | Performed by: NURSE PRACTITIONER

## 2022-08-10 RX ORDER — GABAPENTIN 300 MG/1
300 CAPSULE ORAL
Qty: 30 CAPSULE | Refills: 0 | Status: CANCELLED | OUTPATIENT
Start: 2022-08-10

## 2022-08-10 RX ORDER — TIZANIDINE 2 MG/1
TABLET ORAL
Qty: 60 TABLET | Refills: 2 | Status: SHIPPED | OUTPATIENT
Start: 2022-08-10 | End: 2022-09-23 | Stop reason: ALTCHOICE

## 2022-08-10 RX ORDER — TIZANIDINE 2 MG/1
TABLET ORAL
Qty: 60 TABLET | Refills: 2 | Status: CANCELLED | OUTPATIENT
Start: 2022-08-10

## 2022-08-10 NOTE — PROGRESS NOTES
Assessment:  1  Chronic pain syndrome    2  Neck pain - Right    3  Cervical radiculopathy    4  Myofascial pain syndrome        Plan:  While the patient was in the office today, I did have a thorough conversation with the patient regarding their chronic pain syndrome, symptoms, medication regimen, and treatment plan  I discussed with the patient at this point time since he does not have any significant upper extremity radicular symptoms or weakness and most of his pain seems significantly myofascial nature, I do not feel it is necessary or appropriate at this time to proceed with an updated MRI  The patient was agreeable and verbalized an understanding  I did discuss with the patient that because I do feel there is definitely significant myofascial and neuropathic component, that he may benefit from right-sided cervical paravertebral musculature trigger point injections under ultrasound guidance with Dr Greg Jamison  The patient was agreeable and verbalized an understanding  To try to address the myofascial component and help him sleep better at nighttime and going to start the patient on tizanidine 2 mg and he can take 1-2 pills at bedtime as needed for pain and spasms  I advised the patient that if they experience any side effects or issues with the changes in their medication regiment, they should give our office a call to discuss  I also advised the patient not to drive or operate machinery until they see how the changes in the medication regimen affects them  The patient was agreeable and verbalized an understanding  The patient is schedule follow-up office visit 3-4 weeks after the trigger point injections for re-evaluation at that point time we will re-evaluate his medication regimen treatment plan options as appropriate  The patient was agreeable and verbalized an understanding  History of Present Illness:     The patient is a 74 y o  male last seen on 6/29/22 who presents for a follow up office visit in regards to chronic pain syndrome, as the patient's pain has been ongoing for greater than a year, secondary to cervical myofascial pain with radiculopathy  The patient currently reports that since his last office visit overall his right-sided cervical pain and upper quarter myofascial pain symptoms have remained the same  The patient did proceed with the updated cervical spine x-rays which do show mild but stable degenerative changes with mild foraminal narrowing on the right C3-C4 level without any evidence of fractures  The patient reports that he did try the gabapentin 300 mg at bedtime as prescribed and completed the prescription and did not notice any difference with or without the medication  The patient presents today for re-evaluation as he continues with the right-sided intermittent cervical pain that is unpredictable and on reproducible as he feels it is more of a spasm/tightening pain and denies any significant are continuous upper extremity radicular symptoms or weakness  I have personally reviewed and/or updated the patient's past medical history, past surgical history, family history, social history, current medications, allergies, and vital signs today  Review of Systems:    Review of Systems   Respiratory: Negative for shortness of breath  Cardiovascular: Negative for chest pain  Gastrointestinal: Negative for constipation, diarrhea, nausea and vomiting  Musculoskeletal: Positive for joint swelling  Negative for arthralgias, gait problem and myalgias  Skin: Negative for rash  Neurological: Negative for dizziness, seizures and weakness  All other systems reviewed and are negative          Past Medical History:   Diagnosis Date    Anxiety     Arthritis     Depression     GERD (gastroesophageal reflux disease)     Gout     Hyperlipidemia     Hypertension     Kidney stone     Psoriatic arthritis (HCC)     PTSD (post-traumatic stress disorder)     SVT (supraventricular tachycardia) (HCC)     Thyroid tumor, benign        Past Surgical History:   Procedure Laterality Date    CARDIAC ELECTROPHYSIOLOGY MAPPING AND ABLATION      SVT    PARATHYROIDECTOMY      PROSTATE SURGERY         Family History   Problem Relation Age of Onset    Coronary artery disease Brother     Hypertension Mother     No Known Problems Father        Social History     Occupational History    Not on file   Tobacco Use    Smoking status: Never Smoker    Smokeless tobacco: Never Used   Vaping Use    Vaping Use: Never used   Substance and Sexual Activity    Alcohol use: No    Drug use: No    Sexual activity: Not Currently         Current Outpatient Medications:     albuterol (PROVENTIL HFA,VENTOLIN HFA) 90 mcg/act inhaler, Inhale 2 puffs every 6 (six) hours as needed for wheezing or shortness of breath, Disp: 8 g, Rfl: 0    atorvastatin (LIPITOR) 40 mg tablet, Take 40 mg by mouth , Disp: , Rfl:     azelastine (ASTELIN) 0 1 % nasal spray, 1 spray into each nostril 2 (two) times a day Use in each nostril as directed, Disp: 1 mL, Rfl: 0    Carboxymethylcellulose Sod PF 0 25 % SOLN, INSTILL ONE DROP BOTH EYES FOUR TIMES A DAY FOR DRY EYES, Disp: , Rfl:     cetirizine (ZyrTEC) 10 mg tablet, Take 1 tablet (10 mg total) by mouth daily, Disp: 30 tablet, Rfl: 0    Cholecalciferol 2000 units CAPS, Take 2,000 Units by mouth, Disp: , Rfl:     dextran 70-hypromellose (GENTEAL TEARS) 0 1-0 3 % ophthalmic solution, Apply 1 drop to eye, Disp: , Rfl:     gabapentin (NEURONTIN) 300 mg capsule, Take 1 capsule (300 mg total) by mouth daily at bedtime, Disp: 30 capsule, Rfl: 0    ketotifen (ZADITOR) 0 025 % ophthalmic solution, INSTILL ONE DROP BOTH EYES TWICE A DAY, Disp: , Rfl:     leflunomide (ARAVA) 20 MG tablet, Take 1 tablet (20 mg total) by mouth daily, Disp: 90 tablet, Rfl: 1    polyvinyl alcohol (LIQUIFILM TEARS) 1 4 % ophthalmic solution, Apply 1 drop to eye, Disp: , Rfl:     sertraline (ZOLOFT) 100 mg tablet, Take 100 mg by mouth daily , Disp: , Rfl:     SODIUM FLUORIDE, DENTAL GEL, 1 1 % GEL, APPLY SMALL AMOUNT TO BY MOUTH DAILY, Disp: , Rfl:     sulfaSALAzine (AZULFIDINE) 500 mg tablet, Take 2 tabs twice a day, Disp: 360 tablet, Rfl: 1    tiZANidine (ZANAFLEX) 2 mg tablet, Take 1-2 PO HS PRN for pain and spasms  , Disp: 60 tablet, Rfl: 2    triamcinolone (KENALOG) 0 1 % cream, Apply topically 2 (two) times a day, Disp: 80 g, Rfl: 3    Allergies   Allergen Reactions    Cefadroxil Other (See Comments)     bleeding internally    Midazolam Other (See Comments)     ARRHYTHMIA    Prazosin     Fentanyl Palpitations       Physical Exam:    /68   Pulse 93   Temp 97 8 °F (36 6 °C)   Ht 5' 10" (1 778 m)   Wt 65 8 kg (145 lb)   SpO2 96%   BMI 20 81 kg/m²     Constitutional:normal, well developed, well nourished, alert, in no distress and non-toxic and no overt pain behavior  Eyes:anicteric  HEENT:grossly intact  Neck:supple, symmetric, trachea midline and no masses  and Trigger points and spasms noted upon exam of the right upper trapezius, levator scapula, scalene muscles  Pulmonary:even and unlabored  Cardiovascular:No edema or pitting edema present  Skin:Normal without rashes or lesions and well hydrated  Psychiatric:Mood and affect appropriate  Neurologic:Cranial Nerves II-XII grossly intact  Musculoskeletal:normal      Imaging  No orders to display         No orders of the defined types were placed in this encounter

## 2022-08-10 NOTE — PATIENT INSTRUCTIONS
Tizanidine (By mouth)   Tizanidine ([id number])  Treats muscle spasticity  Brand Name(s): Zanaflex, Zanaflex Capsule   There may be other brand names for this medicine  When This Medicine Should Not Be Used: This medicine is not right for everyone  Do not use if you had an allergic reaction to tizanidine  How to Use This Medicine:   Capsule, Tablet  Take your medicine as directed  Your dose may need to be changed several times to find what works best for you  You may take this medicine with or without food, but always take it the same way every time  Tizanidine works differently depending on whether you take it on an empty stomach or a full stomach  Talk to your doctor if you have any questions about this  Missed dose: Take a dose as soon as you remember  If it is almost time for your next dose, wait until then and take a regular dose  Do not take extra medicine to make up for a missed dose  Store the medicine in a closed container at room temperature, away from heat, moisture, and direct light  Drugs and Foods to Avoid:   Ask your doctor or pharmacist before using any other medicine, including over-the-counter medicines, vitamins, and herbal products  Do not use this medicine together with ciprofloxacin or fluvoxamine  Some foods and medicines can affect how tizanidine works  Tell your doctor if you are using any of the following:  Acyclovir, baclofen, cimetidine, famotidine, ticlopidine, verapamil, zileuton  Birth control pills, blood pressure medicine, medicine for heart rhythm problems (such as amiodarone, mexiletine, propafenone), or medicine to treat an infection (such as levofloxacin, moxifloxacin)  Do not drink alcohol while you are using this medicine  Tell your doctor if you use anything else that makes you sleepy  Some examples are allergy medicine, narcotic pain medicine, and alcohol    Warnings While Using This Medicine:   Tell your doctor if you are pregnant or breastfeeding, or if you have kidney disease or liver disease  This medicine may cause the following problems:  Low blood pressure  Liver damage  This medicine may make you dizzy or drowsy  Do not drive or do anything else that could be dangerous until you know how this medicine affects you  Stand or sit up slowly if you are dizzy  Do not stop using this medicine suddenly  Your doctor will need to slowly decrease your dose before you stop it completely  Your doctor will do lab tests at regular visits to check on the effects of this medicine  Keep all appointments  Keep all medicine out of the reach of children  Never share your medicine with anyone  Possible Side Effects While Using This Medicine:   Call your doctor right away if you notice any of these side effects: Allergic reaction: Itching or hives, swelling in your face or hands, swelling or tingling in your mouth or throat, chest tightness, trouble breathing  Dark urine or pale stools, nausea, vomiting, loss of appetite, stomach pain, yellow skin or eyes  Lightheadedness, dizziness, or fainting  Seeing or hearing things that are not really there  Slow heartbeat  If you notice these less serious side effects, talk with your doctor:   Dry mouth  Drowsiness or sleepiness  Weakness  If you notice other side effects that you think are caused by this medicine, tell your doctor  Call your doctor for medical advice about side effects  You may report side effects to FDA at 0-417-FDA-7831    © Copyright [employer] 2022 Information is for End User's use only and may not be sold, redistributed or otherwise used for commercial purposes  The above information is an  only  It is not intended as medical advice for individual conditions or treatments  Talk to your doctor, nurse or pharmacist before following any medical regimen to see if it is safe and effective for you

## 2022-08-11 ENCOUNTER — PROCEDURE VISIT (OUTPATIENT)
Dept: PAIN MEDICINE | Facility: MEDICAL CENTER | Age: 72
End: 2022-08-11
Payer: MEDICARE

## 2022-08-11 DIAGNOSIS — M79.18 MYOFASCIAL PAIN SYNDROME: Primary | ICD-10-CM

## 2022-08-11 PROBLEM — Z79.899 HIGH RISK MEDICATION USE: Status: ACTIVE | Noted: 2022-08-11

## 2022-08-11 PROCEDURE — 20552 NJX 1/MLT TRIGGER POINT 1/2: CPT | Performed by: PHYSICAL MEDICINE & REHABILITATION

## 2022-08-11 PROCEDURE — 76942 ECHO GUIDE FOR BIOPSY: CPT | Performed by: PHYSICAL MEDICINE & REHABILITATION

## 2022-08-11 RX ORDER — BUPIVACAINE HYDROCHLORIDE 2.5 MG/ML
10 INJECTION, SOLUTION EPIDURAL; INFILTRATION; INTRACAUDAL ONCE
Status: COMPLETED | OUTPATIENT
Start: 2022-08-11 | End: 2022-08-11

## 2022-08-11 RX ORDER — METHYLPREDNISOLONE ACETATE 40 MG/ML
40 INJECTION, SUSPENSION INTRA-ARTICULAR; INTRALESIONAL; INTRAMUSCULAR; SOFT TISSUE ONCE
Status: COMPLETED | OUTPATIENT
Start: 2022-08-11 | End: 2022-08-11

## 2022-08-11 RX ADMIN — METHYLPREDNISOLONE ACETATE 40 MG: 40 INJECTION, SUSPENSION INTRA-ARTICULAR; INTRALESIONAL; INTRAMUSCULAR; SOFT TISSUE at 14:13

## 2022-08-11 RX ADMIN — BUPIVACAINE HYDROCHLORIDE 10 ML: 2.5 INJECTION, SOLUTION EPIDURAL; INFILTRATION; INTRACAUDAL at 14:13

## 2022-08-11 NOTE — PROGRESS NOTES
Indication:  Muscular pain  Preprocedure diagnosis:  1  Myofascial pain syndrome  Postprocedure diagnosis:  1  Myofascial pain syndrome    Procedure:  Ultrasound-guided right cervical paraspinal muscle trigger point injection(s)  After discussing the risks, benefits, and alternatives to the procedure, the patient expressed understanding and wished to proceed  The patient was brought to the procedure suite and placed in the prone position  A procedural pause was conducted to verify:  correct patient identity, procedure to be performed and as applicable, correct side and site, correct patient position, and availability of implants, special equipment or special requirements  A simple surgical tray was used  Prior to the procedure, the area of maximal tenderness was examined with a 12 MHz linear transducer to visualize the cervical paraspinal musculature in and determine the optimal needle path  Following this, the area was prepared with a ChloraPrep scrub, then re-examined using the same transducer, a sterile ultrasound transducer cover, and sterile ultrasound transducer gel  Thereafter, using ultrasound guidance, a 2 5-inch 25-gauge needle was advanced into the cervical paraspinal muscle  After visualization of the tip and negative aspiration for blood, a mixture of 40 mg Depo-Medrol in 1 cc 0 25% bupivacaine was injected into the area of maximal tenderness  Following the injection, the needle was withdrawn  The patient tolerated the procedure well and there were no apparent complications  After an appropriate amount of observation, the patient was dismissed from the clinic in good condition under their own power      [ ]

## 2022-08-18 ENCOUNTER — TELEPHONE (OUTPATIENT)
Dept: PAIN MEDICINE | Facility: CLINIC | Age: 72
End: 2022-08-18

## 2022-08-22 NOTE — TELEPHONE ENCOUNTER
2nd attempt call, unable to leave msg to call back with % of improvement and pain level   Mailbox is full

## 2022-08-26 ENCOUNTER — APPOINTMENT (OUTPATIENT)
Dept: LAB | Age: 72
End: 2022-08-26
Payer: MEDICARE

## 2022-08-26 LAB
ALBUMIN SERPL BCP-MCNC: 3.4 G/DL (ref 3.5–5)
ALP SERPL-CCNC: 79 U/L (ref 46–116)
ALT SERPL W P-5'-P-CCNC: 32 U/L (ref 12–78)
ANION GAP SERPL CALCULATED.3IONS-SCNC: 5 MMOL/L (ref 4–13)
AST SERPL W P-5'-P-CCNC: 29 U/L (ref 5–45)
BASOPHILS # BLD AUTO: 0.07 THOUSANDS/ΜL (ref 0–0.1)
BASOPHILS NFR BLD AUTO: 2 % (ref 0–1)
BILIRUB SERPL-MCNC: 0.41 MG/DL (ref 0.2–1)
BUN SERPL-MCNC: 16 MG/DL (ref 5–25)
CALCIUM ALBUM COR SERPL-MCNC: 9.6 MG/DL (ref 8.3–10.1)
CALCIUM SERPL-MCNC: 9.1 MG/DL (ref 8.3–10.1)
CHLORIDE SERPL-SCNC: 109 MMOL/L (ref 96–108)
CHOLEST SERPL-MCNC: 209 MG/DL
CO2 SERPL-SCNC: 28 MMOL/L (ref 21–32)
CREAT SERPL-MCNC: 0.86 MG/DL (ref 0.6–1.3)
EOSINOPHIL # BLD AUTO: 0.43 THOUSAND/ΜL (ref 0–0.61)
EOSINOPHIL NFR BLD AUTO: 10 % (ref 0–6)
ERYTHROCYTE [DISTWIDTH] IN BLOOD BY AUTOMATED COUNT: 12 % (ref 11.6–15.1)
EST. AVERAGE GLUCOSE BLD GHB EST-MCNC: 94 MG/DL
GFR SERPL CREATININE-BSD FRML MDRD: 85 ML/MIN/1.73SQ M
GLUCOSE P FAST SERPL-MCNC: 96 MG/DL (ref 65–99)
HBA1C MFR BLD: 4.9 %
HCT VFR BLD AUTO: 42.5 % (ref 36.5–49.3)
HDLC SERPL-MCNC: 82 MG/DL
HGB BLD-MCNC: 13.4 G/DL (ref 12–17)
IMM GRANULOCYTES # BLD AUTO: 0.01 THOUSAND/UL (ref 0–0.2)
IMM GRANULOCYTES NFR BLD AUTO: 0 % (ref 0–2)
LDLC SERPL CALC-MCNC: 107 MG/DL (ref 0–100)
LYMPHOCYTES # BLD AUTO: 1.28 THOUSANDS/ΜL (ref 0.6–4.47)
LYMPHOCYTES NFR BLD AUTO: 29 % (ref 14–44)
MCH RBC QN AUTO: 32.1 PG (ref 26.8–34.3)
MCHC RBC AUTO-ENTMCNC: 31.5 G/DL (ref 31.4–37.4)
MCV RBC AUTO: 102 FL (ref 82–98)
MONOCYTES # BLD AUTO: 0.6 THOUSAND/ΜL (ref 0.17–1.22)
MONOCYTES NFR BLD AUTO: 14 % (ref 4–12)
NEUTROPHILS # BLD AUTO: 2.01 THOUSANDS/ΜL (ref 1.85–7.62)
NEUTS SEG NFR BLD AUTO: 45 % (ref 43–75)
NRBC BLD AUTO-RTO: 0 /100 WBCS
PLATELET # BLD AUTO: 175 THOUSANDS/UL (ref 149–390)
PMV BLD AUTO: 11.9 FL (ref 8.9–12.7)
POTASSIUM SERPL-SCNC: 4 MMOL/L (ref 3.5–5.3)
PROT SERPL-MCNC: 7.2 G/DL (ref 6.4–8.4)
RBC # BLD AUTO: 4.17 MILLION/UL (ref 3.88–5.62)
SODIUM SERPL-SCNC: 142 MMOL/L (ref 135–147)
TRIGL SERPL-MCNC: 102 MG/DL
TSH SERPL DL<=0.05 MIU/L-ACNC: 2.59 UIU/ML (ref 0.45–4.5)
WBC # BLD AUTO: 4.4 THOUSAND/UL (ref 4.31–10.16)

## 2022-08-26 PROCEDURE — 84443 ASSAY THYROID STIM HORMONE: CPT

## 2022-08-26 PROCEDURE — 80061 LIPID PANEL: CPT

## 2022-08-26 PROCEDURE — 80053 COMPREHEN METABOLIC PANEL: CPT

## 2022-08-26 PROCEDURE — 36415 COLL VENOUS BLD VENIPUNCTURE: CPT

## 2022-08-26 PROCEDURE — 83036 HEMOGLOBIN GLYCOSYLATED A1C: CPT

## 2022-08-26 PROCEDURE — 85025 COMPLETE CBC W/AUTO DIFF WBC: CPT

## 2022-09-06 ENCOUNTER — TELEPHONE (OUTPATIENT)
Dept: FAMILY MEDICINE CLINIC | Facility: CLINIC | Age: 72
End: 2022-09-06

## 2022-09-06 NOTE — TELEPHONE ENCOUNTER
----- Message from Duyen Dumont MD sent at 9/5/2022  5:54 PM EDT -----    Blood work came back showing slightly elevated cholesterol otherwise came back normal

## 2022-09-06 NOTE — TELEPHONE ENCOUNTER
I tried calling pt and was unable to leave message mailbox full   I sent him message through his my chart

## 2022-09-18 ENCOUNTER — RA CDI HCC (OUTPATIENT)
Dept: OTHER | Facility: HOSPITAL | Age: 72
End: 2022-09-18

## 2022-09-18 NOTE — PROGRESS NOTES
[id number]  [facility] 75  coding opportunities       Chart reviewed, no opportunity found: CHART REVIEWED, NO OPPORTUNITY FOUND        Patients Insurance     Medicare Insurance: Medicare

## 2022-09-23 ENCOUNTER — OFFICE VISIT (OUTPATIENT)
Dept: FAMILY MEDICINE CLINIC | Facility: CLINIC | Age: 72
End: 2022-09-23
Payer: MEDICARE

## 2022-09-23 VITALS
TEMPERATURE: 97.8 F | HEART RATE: 94 BPM | OXYGEN SATURATION: 96 % | WEIGHT: 144 LBS | RESPIRATION RATE: 16 BRPM | HEIGHT: 70 IN | SYSTOLIC BLOOD PRESSURE: 132 MMHG | DIASTOLIC BLOOD PRESSURE: 66 MMHG | BODY MASS INDEX: 20.62 KG/M2

## 2022-09-23 DIAGNOSIS — K21.9 GASTROESOPHAGEAL REFLUX DISEASE, UNSPECIFIED WHETHER ESOPHAGITIS PRESENT: ICD-10-CM

## 2022-09-23 DIAGNOSIS — E78.2 MIXED HYPERLIPIDEMIA: Primary | ICD-10-CM

## 2022-09-23 DIAGNOSIS — R73.9 HYPERGLYCEMIA: ICD-10-CM

## 2022-09-23 DIAGNOSIS — Z12.5 PROSTATE CANCER SCREENING: ICD-10-CM

## 2022-09-23 DIAGNOSIS — F32.A DEPRESSIVE DISORDER: ICD-10-CM

## 2022-09-23 DIAGNOSIS — Z00.00 HEALTHCARE MAINTENANCE: ICD-10-CM

## 2022-09-23 DIAGNOSIS — I10 ESSENTIAL HYPERTENSION: ICD-10-CM

## 2022-09-23 DIAGNOSIS — F32.5 MAJOR DEPRESSIVE DISORDER, SINGLE EPISODE IN FULL REMISSION (HCC): ICD-10-CM

## 2022-09-23 DIAGNOSIS — E44.1 MILD PROTEIN-CALORIE MALNUTRITION (HCC): ICD-10-CM

## 2022-09-23 DIAGNOSIS — E21.3 HYPERPARATHYROIDISM (HCC): ICD-10-CM

## 2022-09-23 PROBLEM — H35.362 DRUSEN (DEGENERATIVE) OF MACULA, LEFT EYE: Status: ACTIVE | Noted: 2020-06-22

## 2022-09-23 PROCEDURE — G0439 PPPS, SUBSEQ VISIT: HCPCS | Performed by: FAMILY MEDICINE

## 2022-09-23 PROCEDURE — 99214 OFFICE O/P EST MOD 30 MIN: CPT | Performed by: FAMILY MEDICINE

## 2022-09-23 NOTE — ASSESSMENT & PLAN NOTE
Malnutrition Findings:                       improved  Continue increase protein in your diet  Continue to monitor  BMI Findings: Body mass index is 20 66 kg/m²

## 2022-09-23 NOTE — PROGRESS NOTES
Assessment and Plan:     Problem List Items Addressed This Visit        Digestive    Gastroesophageal reflux disease     Stable  Will continue to monitor  Endocrine    Hyperparathyroidism ([facility] [address] [address] )     We will continue to monitor  Relevant Orders    PTH, intact       Cardiovascular and Mediastinum    Essential hypertension     Well controlled  Continue same  Will continue to monitor  Relevant Orders    CBC and differential    Comprehensive metabolic panel    Lipid Panel with Direct LDL reflex    TSH, 3rd generation with Free T4 reflex       Other    Mixed hyperlipidemia - Primary    Relevant Orders    CBC and differential    Comprehensive metabolic panel    Lipid Panel with Direct LDL reflex    TSH, 3rd generation with Free T4 reflex    Depressive disorder     Stable  Continue same  Will continue to monitor  Healthcare maintenance     It was discussed about immunizations, diet, exercise and safety measures  Hyperglycemia    Relevant Orders    Hemoglobin A1C    Mild protein-calorie malnutrition ([facility] [address] )     Malnutrition Findings:                       improved  Continue increase protein in your diet  Continue to monitor  BMI Findings: Body mass index is 20 66 kg/m²  Major depressive disorder, single episode in full remission ([facility] [address] )     Stable  Continue same  Will continue to monitor  Other Visit Diagnoses     Prostate cancer screening        Relevant Orders    PSA, Total Screen           Preventive health issues were discussed with patient, and age appropriate screening tests were ordered as noted in patient's After Visit Summary  Personalized health advice and appropriate referrals for health education or preventive services given if needed, as noted in patient's After Visit Summary  History of Present Illness:     Patient presents for a Medicare Wellness Visit    He is here today for follow-up multiple medical problems    He has been taking his medications  He denies any side effects from his medications  Continue follow-up with rheumatologist and with pain management  He had blood work done recently showed slightly elevated LDL  All the rest of blood work came back normal      Patient Care Team:  Henrene Libman, MD as PCP - General (Family Medicine)     Review of Systems:     Review of Systems   Constitutional: Negative for chills and fever  HENT: Negative for trouble swallowing  Eyes: Negative for visual disturbance  Respiratory: Negative for cough and shortness of breath  Cardiovascular: Negative for chest pain and palpitations  Gastrointestinal: Negative for abdominal pain, blood in stool and vomiting  Endocrine: Negative for cold intolerance and heat intolerance  Genitourinary: Negative for difficulty urinating and dysuria  Musculoskeletal: Negative for gait problem  Skin: Negative for rash  Neurological: Negative for dizziness, syncope and headaches  Hematological: Negative for adenopathy  Psychiatric/Behavioral: Negative for behavioral problems          Problem List:     Patient Active Problem List   Diagnosis    PSVT [facility]ox[facility]cardia) [facility]    ED (erectile dysfunction) of organic origin    Hypercalcemia    Hyperparathyroidism ([facility] )    Lipid disorder    Nephrolithiasis    Osteopenia of multiple sites    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    Inflammatory arthritis    Preop examination    Vitamin D deficiency    Refractive amblyopia    Retinal dot hemorrhage    Presbyopia    Polyp of colon    Nonexudative senile macular degeneration of retina    Drusen (degenerative) of macula, left eye    Insomnia    Essential hypertension    Mixed hyperlipidemia    Ganglion    Depressive disorder    Combined form of senile cataract    Chronic bronchitis (HCC)    Asthma    Allergic rhinitis    Allergic conjunctivitis    Alcohol abuse  Suicidal ideation    Chronic seborrheic dermatitis    Bilateral carotid artery stenosis    Gastroesophageal reflux disease    Benign prostatic hyperplasia with weak urinary stream    History of hyperparathyroidism    Acute pain of right shoulder    Healthcare maintenance    Psoriasis    Bilateral impacted cerumen    Arthropathic psoriasis, unspecified (HCC)    Hyperglycemia    Chronic pain of right knee    Post-traumatic stress disorder, chronic    Mild protein-calorie malnutrition (HCC)    Chronic pain syndrome    Neck pain - Right    Cervical radiculopathy    Myofascial pain syndrome    High risk medication use    Major depressive disorder, single episode in full remission ([facility] )      Past Medical and Surgical History:     Past Medical History:   Diagnosis Date    Anxiety     Arthritis     Depression     GERD (gastroesophageal reflux disease)     Gout     Hyperlipidemia     Hypertension     Kidney stone     [facility])     PTSD (post-traumatic stress disorder)     SVT (supraventricular tachycardia) ([facility])     Thyroid tumor, benign      Past Surgical History:   Procedure Laterality Date    CARDIAC ELECTROPHYSIOLOGY MAPPING AND ABLATION      SVT    PARATHYROIDECTOMY      PROSTATE SURGERY        Family History:     Family History   Problem Relation Age of Onset    Coronary artery disease Brother     Hypertension Mother     No Known Problems Father       Social History:     Social History     Socioeconomic History    Marital status:      Spouse name: None    Number of children: None    Years of education: None    Highest education level: None   Occupational History    None   Tobacco Use    Smoking status: Never Smoker    Smokeless tobacco: Never Used   Vaping Use    Vaping Use: Never used   Substance and Sexual Activity    Alcohol use: No    Drug use: No    Sexual activity: Not Currently   Other Topics Concern    None   Social History Narrative  None     Social Determinants of Health     Financial Resource Strain: Not on file   Food Insecurity: Not on file   Transportation Needs: Not on file   Physical Activity: Not on file   Stress: Not on file   Social Connections: Not on file   Intimate Partner Violence: Not on file   Housing Stability: Not on file      Medications and Allergies:     Current Outpatient Medications   Medication Sig Dispense Refill    albuterol (PROVENTIL HFA,VENTOLIN HFA) 90 mcg/act inhaler Inhale 2 puffs every 6 (six) hours as needed for wheezing or shortness of breath 8 g 0    atorvastatin (LIPITOR) 40 mg tablet Take 40 mg by mouth       azelastine (ASTELIN) 0 1 % nasal spray 1 spray into each nostril 2 (two) times a day Use in each nostril as directed 1 mL 0    Carboxymethylcellulose Sod PF 0 25 % SOLN INSTILL ONE DROP BOTH EYES FOUR TIMES A DAY FOR DRY EYES      Cholecalciferol 2000 units CAPS Take 2,000 Units by mouth      dextran 70-hypromellose (GENTEAL TEARS) 0 1-0 3 % ophthalmic solution Apply 1 drop to eye      ketotifen (ZADITOR) 0 025 % ophthalmic solution INSTILL ONE DROP BOTH EYES TWICE A DAY      leflunomide (ARAVA) 20 MG tablet Take 1 tablet (20 mg total) by mouth daily 90 tablet 1    polyvinyl alcohol (LIQUIFILM TEARS) 1 4 % ophthalmic solution Apply 1 drop to eye      sertraline (ZOLOFT) 100 mg tablet Take 100 mg by mouth daily       SODIUM FLUORIDE, DENTAL GEL, 1 1 % GEL APPLY SMALL AMOUNT TO BY MOUTH DAILY      sulfaSALAzine (AZULFIDINE) 500 mg tablet Take 2 tabs twice a day 360 tablet 1    triamcinolone (KENALOG) 0 1 % cream Apply topically 2 (two) times a day 80 g 3     No current facility-administered medications for this visit       Allergies   Allergen Reactions    Cefadroxil Other (See Comments)     bleeding internally    Midazolam Other (See Comments)     ARRHYTHMIA    Prazosin     Fentanyl Palpitations      Immunizations:     Immunization History   Administered Date(s) Administered   Tu Franco COVID-19 MODERNA VACC 0 5 ML IM 11/24/2021    COVID-19, unspecified 02/04/2021, 03/04/2021    INFLUENZA 11/27/2006, 11/05/2007, 10/20/2008, 09/22/2009, 10/29/2010, 10/26/2011, 10/09/2012, 02/05/2013, 09/30/2013, 10/15/2014, 10/20/2016    Influenza Quadrivalent Preservative Free 3 years and older IM 10/01/2018, 10/21/2019, 10/15/2020    Influenza, high dose seasonal 0 7 mL 09/20/2021    Influenza, injectable, quadrivalent, preservative free 0 5 mL 10/01/2018, 10/21/2019, 10/15/2020    Influenza, seasonal, injectable, preservative free 10/20/2015, 10/19/2016, 10/11/2017    Pneumococcal 11/05/2007, 11/03/2015    Pneumococcal Conjugate 13-Valent 01/19/2016    Pneumococcal Conjugate PCV 7 11/03/2015    Pneumococcal Polysaccharide PPV23 09/05/2017    Td (adult), Unspecified 09/18/2008    Tdap 06/02/2014, 05/31/2020    Zoster 11/03/2013, 03/31/2015    Zoster Vaccine Recombinant 08/07/2020      Health Maintenance:         Topic Date Due    Colorectal Cancer Screening  06/06/2022    Hepatitis C Screening  Completed         Topic Date Due    Hepatitis A Vaccine (1 of 2 - Risk 2-dose series) Never done    Hepatitis B Vaccine (1 of 3 - Risk 3-dose series) Never done    COVID-19 Vaccine (4 - Booster) 02/24/2022    Influenza Vaccine (1) 09/01/2022      Medicare Screening Tests and Risk Assessments:     Beth Godwin is here for his Subsequent Wellness visit  Health Risk Assessment:   Patient rates overall health as very good  Patient feels that their physical health rating is same  Patient is satisfied with their life  Eyesight was rated as slightly worse  Hearing was rated as same  Patient feels that their emotional and mental health rating is same  Patients states they are sometimes angry  Patient states they are sometimes unusually tired/fatigued  Pain experienced in the last 7 days has been some  Patient's pain rating has been 5/10   Patient states that he has experienced no weight loss or gain in last 6 months  Fall Risk Screening: In the past year, patient has experienced: no history of falling in past year      Home Safety:  Patient does not have trouble with stairs inside or outside of their home  Patient has working smoke alarms and has no working carbon monoxide detector  Home safety hazards include: none  Nutrition:   Current diet is Regular  Medications:   Patient is currently taking over-the-counter supplements  OTC medications include: see medication list  Patient is able to manage medications  Activities of Daily Living (ADLs)/Instrumental Activities of Daily Living (IADLs):   Walk and transfer into and out of bed and chair?: Yes  Dress and groom yourself?: Yes    Bathe or shower yourself?: Yes    Feed yourself? Yes  Do your laundry/housekeeping?: Yes  Manage your money, pay your bills and track your expenses?: Yes  Make your own meals?: Yes    Do your own shopping?: Yes    Previous Hospitalizations:   Any hospitalizations or ED visits within the last 12 months?: No      Advance Care Planning:   Living will: Yes    Durable POA for healthcare:  Yes    Advanced directive: Yes      Cognitive Screening:   Provider or family/friend/caregiver concerned regarding cognition?: No    PREVENTIVE SCREENINGS      Cardiovascular Screening:    General: Screening Not Indicated and History Lipid Disorder      Diabetes Screening:     General: Screening Current      Colorectal Cancer Screening:     General: Screening Current      Prostate Cancer Screening:    General: Screening Current      Osteoporosis Screening:    General: Screening Not Indicated and History Osteoporosis      Abdominal Aortic Aneurysm (AAA) Screening:    Risk factors include: age between 73-69 yo        General: Risks and Benefits Discussed      Lung Cancer Screening:     General: Screening Not Indicated      Hepatitis C Screening:    General: Screening Current    Screening, Brief Intervention, and Referral to Treatment (SBIRT)    Screening  Typical number of drinks in a day: 0  Typical number of drinks in a week: 0  Interpretation: Low risk drinking behavior  Single Item Drug Screening:  How often have you used an illegal drug (including marijuana) or a prescription medication for non-medical reasons in the past year? never    Single Item Drug Screen Score: 0  Interpretation: Negative screen for possible drug use disorder    Brief Intervention  Alcohol & drug use screenings were reviewed  No concerns regarding substance use disorder identified  Other Counseling Topics:   Calcium and vitamin D intake and regular weightbearing exercise  No exam data present     Physical Exam:     /66 (BP Location: Left arm, Patient Position: Sitting, Cuff Size: Adult)   Pulse 94   Temp 97 8 °F (36 6 °C) (Tympanic)   Resp 16   Ht 5' 10" (1 778 m)   Wt 65 3 kg (144 lb)   SpO2 96%   BMI 20 66 kg/m²     Physical Exam  Vitals and nursing note reviewed  Constitutional:       Appearance: He is well-developed  HENT:      Head: Normocephalic and atraumatic  Eyes:      Conjunctiva/sclera: Conjunctivae normal    Cardiovascular:      Rate and Rhythm: Normal rate and regular rhythm  Heart sounds: No murmur heard  Pulmonary:      Effort: Pulmonary effort is normal  No respiratory distress  Breath sounds: Normal breath sounds  Abdominal:      Palpations: Abdomen is soft  Tenderness: There is no abdominal tenderness  Musculoskeletal:      Cervical back: Neck supple  Skin:     General: Skin is warm and dry  Neurological:      Mental Status: He is alert            Nicole Barajas MD

## 2022-10-12 PROBLEM — Z00.00 HEALTHCARE MAINTENANCE: Status: RESOLVED | Noted: 2020-06-23 | Resolved: 2022-10-12

## 2023-01-23 ENCOUNTER — CONSULT (OUTPATIENT)
Dept: FAMILY MEDICINE CLINIC | Facility: CLINIC | Age: 73
End: 2023-01-23

## 2023-01-23 VITALS
BODY MASS INDEX: 20.9 KG/M2 | HEIGHT: 70 IN | DIASTOLIC BLOOD PRESSURE: 60 MMHG | OXYGEN SATURATION: 97 % | HEART RATE: 77 BPM | TEMPERATURE: 97.5 F | RESPIRATION RATE: 16 BRPM | WEIGHT: 146 LBS | SYSTOLIC BLOOD PRESSURE: 100 MMHG

## 2023-01-23 DIAGNOSIS — I47.1 PSVT (PAROXYSMAL SUPRAVENTRICULAR TACHYCARDIA) (HCC): ICD-10-CM

## 2023-01-23 DIAGNOSIS — Z01.818 PRE-OP EXAMINATION: ICD-10-CM

## 2023-01-23 DIAGNOSIS — H25.89 OTHER AGE-RELATED CATARACT OF BOTH EYES: Primary | ICD-10-CM

## 2023-01-23 PROBLEM — H26.9 CATARACTA: Status: ACTIVE | Noted: 2023-01-23

## 2023-01-23 NOTE — PROGRESS NOTES
Name: Milton Sutherland      : 1950      MRN: [id number]  Encounter Provider: Diaz Stratton MD  Encounter Date: 2023   Encounter department: [address] [place] [address]  Other age-related cataract of both eyes  Assessment & Plan:  Is going for cataract surgery on   Pre-op examination  Assessment & Plan:  He is an acceptable risk for the proposed procedure  3  PSVT (paroxysmal supraventricular tachycardia) (HCC)  Assessment & Plan:  Stable  Asymptomatic  Continue same  Continue follow-up with cardiology  Subjective     Is here today for preop clearance for cataract surgery  He denies any complaint  Review of Systems   Constitutional: Negative for chills and fever  HENT: Negative for trouble swallowing  Eyes: Negative for visual disturbance  Respiratory: Negative for cough and shortness of breath  Cardiovascular: Negative for chest pain and palpitations  Gastrointestinal: Negative for abdominal pain, blood in stool and vomiting  Endocrine: Negative for cold intolerance and heat intolerance  Genitourinary: Negative for difficulty urinating and dysuria  Musculoskeletal: Negative for gait problem  Skin: Negative for rash  Neurological: Negative for dizziness, syncope and headaches  Hematological: Negative for adenopathy  Psychiatric/Behavioral: Negative for behavioral problems         Past Medical History:   Diagnosis Date   • Anxiety    • Arthritis    • Depression    • GERD (gastroesophageal reflux disease)    • Gout    • Hyperlipidemia    • Hypertension    • Kidney stone    • Psoriatic arthritis ([facility] )    • PTSD (post-traumatic stress disorder)    • SVT (supraventricular tachycardia) (HCC)    • Thyroid tumor, benign      Past Surgical History:   Procedure Laterality Date   • CARDIAC ELECTROPHYSIOLOGY MAPPING AND ABLATION      SVT   • PARATHYROIDECTOMY     • PROSTATE SURGERY       Family History   Problem Relation Age of Onset   • Coronary artery disease Brother    • Hypertension Mother    • No Known Problems Father      Social History     Socioeconomic History   • Marital status:      Spouse name: None   • Number of children: None   • Years of education: None   • Highest education level: None   Occupational History   • None   Tobacco Use   • Smoking status: Never   • Smokeless tobacco: Never   Vaping Use   • Vaping Use: Never used   Substance and Sexual Activity   • Alcohol use: No   • Drug use: No   • Sexual activity: Not Currently   Other Topics Concern   • None   Social History Narrative   • None     Social Determinants of Health     Financial Resource Strain: Not on file   Food Insecurity: Not on file   Transportation Needs: Not on file   Physical Activity: Not on file   Stress: Not on file   Social Connections: Not on file   Intimate Partner Violence: Not on file   Housing Stability: Not on file     Current Outpatient Medications on File Prior to Visit   Medication Sig   • albuterol (PROVENTIL HFA,VENTOLIN HFA) 90 mcg/act inhaler Inhale 2 puffs every 6 (six) hours as needed for wheezing or shortness of breath   • atorvastatin (LIPITOR) 40 mg tablet Take 40 mg by mouth    • azelastine (ASTELIN) 0 1 % nasal spray 1 spray into each nostril 2 (two) times a day Use in each nostril as directed   • Carboxymethylcellulose Sod PF 0 25 % SOLN INSTILL ONE DROP BOTH EYES FOUR TIMES A DAY FOR DRY EYES   • Cholecalciferol 2000 units CAPS Take 2,000 Units by mouth   • dextran 70-hypromellose (GENTEAL TEARS) 0 1-0 3 % ophthalmic solution Apply 1 drop to eye   • ketotifen (ZADITOR) 0 025 % ophthalmic solution INSTILL ONE DROP BOTH EYES TWICE A DAY   • leflunomide (ARAVA) 20 MG tablet Take 1 tablet (20 mg total) by mouth daily   • polyvinyl alcohol (LIQUIFILM TEARS) 1 4 % ophthalmic solution Apply 1 drop to eye   • sertraline (ZOLOFT) 100 mg tablet Take 100 mg by mouth daily    • SODIUM FLUORIDE, DENTAL GEL, 1 1 % GEL APPLY SMALL AMOUNT TO BY MOUTH DAILY   • sulfaSALAzine (AZULFIDINE) 500 mg tablet Take 2 tabs twice a day   • triamcinolone (KENALOG) 0 1 % cream Apply topically 2 (two) times a day     Allergies   Allergen Reactions   • Cefadroxil Other (See Comments)     bleeding internally   • Midazolam Other (See Comments)     ARRHYTHMIA   • Prazosin    • Fentanyl Palpitations     Immunization History   Administered Date(s) Administered   • COVID-19, unspecified 02/04/2021, 03/04/2021   • INFLUENZA 11/27/2006, 11/05/2007, 10/20/2008, 09/22/2009, 10/29/2010, 10/26/2011, 10/09/2012, 02/05/2013, 09/30/2013, 10/15/2014, 10/20/2016   • Influenza Quadrivalent Preservative Free 3 years and older IM 10/01/2018, 10/21/2019, 10/15/2020   • Influenza, high dose seasonal 0 7 mL 09/20/2021   • Influenza, injectable, quadrivalent, preservative free 0 5 mL 10/01/2018, 10/21/2019, 10/15/2020   • Influenza, seasonal, injectable, preservative free 10/20/2015, 10/19/2016, 10/11/2017   • Pneumococcal 11/05/2007, 11/03/2015   • Pneumococcal Conjugate 13-Valent 01/19/2016   • Pneumococcal Conjugate PCV 7 11/03/2015   • Pneumococcal Polysaccharide PPV23 09/05/2017   • Td (adult), Unspecified 09/18/2008   • Tdap 06/02/2014, 05/31/2020   • Zoster 11/03/2013, 03/31/2015   • Zoster Vaccine Recombinant 08/07/2020       Objective     /60 (BP Location: Left arm, Patient Position: Sitting, Cuff Size: Adult)   Pulse 77   Temp 97 5 °F (36 4 °C) (Tympanic)   Resp 16   Ht 5' 10" (1 778 m)   Wt 66 2 kg (146 lb)   SpO2 97%   BMI 20 95 kg/m²     Physical Exam  Vitals and nursing note reviewed  Constitutional:       Appearance: He is well-developed  HENT:      Head: Normocephalic and atraumatic  Eyes:      Pupils: Pupils are equal, round, and reactive to light  Cardiovascular:      Rate and Rhythm: Normal rate and regular rhythm  Heart sounds: Normal heart sounds     Pulmonary:      Effort: Pulmonary effort is normal       Breath sounds: Normal breath sounds  Abdominal:      General: Bowel sounds are normal       Palpations: Abdomen is soft  Musculoskeletal:         General: Normal range of motion  Cervical back: Normal range of motion and neck supple  Lymphadenopathy:      Cervical: No cervical adenopathy  Skin:     General: Skin is warm  Findings: No rash  Neurological:      Mental Status: He is alert and oriented to person, place, and time  Cranial Nerves: No cranial nerve deficit         Emely Emerson MD

## 2023-02-27 ENCOUNTER — OFFICE VISIT (OUTPATIENT)
Dept: FAMILY MEDICINE CLINIC | Facility: CLINIC | Age: 73
End: 2023-02-27

## 2023-02-27 VITALS
WEIGHT: 149 LBS | OXYGEN SATURATION: 96 % | RESPIRATION RATE: 16 BRPM | TEMPERATURE: 96.8 F | BODY MASS INDEX: 21.33 KG/M2 | SYSTOLIC BLOOD PRESSURE: 132 MMHG | HEIGHT: 70 IN | DIASTOLIC BLOOD PRESSURE: 70 MMHG | HEART RATE: 82 BPM

## 2023-02-27 DIAGNOSIS — H25.89 OTHER AGE-RELATED CATARACT OF RIGHT EYE: Primary | ICD-10-CM

## 2023-02-27 DIAGNOSIS — Z01.818 PRE-OP EXAMINATION: ICD-10-CM

## 2023-02-27 RX ORDER — PREDNISOLONE ACETATE 10 MG/ML
SUSPENSION/ DROPS OPHTHALMIC
COMMUNITY
Start: 2023-02-08

## 2023-02-27 RX ORDER — MIRTAZAPINE 30 MG/1
TABLET, FILM COATED ORAL
COMMUNITY
Start: 2023-02-08

## 2023-02-27 RX ORDER — KETOROLAC TROMETHAMINE 5 MG/ML
SOLUTION OPHTHALMIC
COMMUNITY
Start: 2023-02-08

## 2023-02-27 RX ORDER — OFLOXACIN 3 MG/ML
SOLUTION/ DROPS OPHTHALMIC
COMMUNITY
Start: 2023-02-08

## 2023-02-27 NOTE — PROGRESS NOTES
Name: Eugenio Rodriguez      : 1950      MRN: [id number]  Encounter Provider: Lisa Velasquez MD  Encounter Date: 2023   Encounter department: [address] [place] [address]  Other age-related cataract of right eye  Assessment & Plan:  Going for cataract surgery for his right eye  2  Pre-op examination  Assessment & Plan:  He is an acceptable risk for the proposed procedure  Subjective     Is here today for preop clearance for cataract surgery  He had his left eye done and he is going now for his right eye  He denies any other complaint  Review of Systems   Constitutional: Negative for chills and fever  HENT: Negative for trouble swallowing  Eyes: Negative for visual disturbance  Respiratory: Negative for cough and shortness of breath  Cardiovascular: Negative for chest pain and palpitations  Gastrointestinal: Negative for abdominal pain, blood in stool and vomiting  Endocrine: Negative for cold intolerance and heat intolerance  Genitourinary: Negative for difficulty urinating and dysuria  Musculoskeletal: Negative for gait problem  Skin: Negative for rash  Neurological: Negative for dizziness, syncope and headaches  Hematological: Negative for adenopathy  Psychiatric/Behavioral: Negative for behavioral problems         Past Medical History:   Diagnosis Date   • Anxiety    • Arthritis    • Depression    • GERD (gastroesophageal reflux disease)    • Gout    • Hyperlipidemia    • Hypertension    • Kidney stone    • Psoriatic arthritis ([facility] [address] )    • PTSD (post-traumatic stress disorder)    • SVT (supraventricular tachycardia) ([facility])    • Thyroid tumor, benign      Past Surgical History:   Procedure Laterality Date   • CARDIAC ELECTROPHYSIOLOGY MAPPING AND ABLATION      SVT   • PARATHYROIDECTOMY     • PROSTATE SURGERY       Family History   Problem Relation Age of Onset   • Coronary artery disease Brother    • Hypertension Mother • No Known Problems Father      Social History     Socioeconomic History   • Marital status:      Spouse name: None   • Number of children: None   • Years of education: None   • Highest education level: None   Occupational History   • None   Tobacco Use   • Smoking status: Never   • Smokeless tobacco: Never   Vaping Use   • Vaping Use: Never used   Substance and Sexual Activity   • Alcohol use: No   • Drug use: No   • Sexual activity: Not Currently   Other Topics Concern   • None   Social History Narrative   • None     Social Determinants of Health     Financial Resource Strain: Not on file   Food Insecurity: Not on file   Transportation Needs: Not on file   Physical Activity: Not on file   Stress: Not on file   Social Connections: Not on file   Intimate Partner Violence: Not on file   Housing Stability: Not on file     Current Outpatient Medications on File Prior to Visit   Medication Sig   • albuterol (PROVENTIL HFA,VENTOLIN HFA) 90 mcg/act inhaler Inhale 2 puffs every 6 (six) hours as needed for wheezing or shortness of breath   • atorvastatin (LIPITOR) 40 mg tablet Take 40 mg by mouth    • azelastine (ASTELIN) 0 1 % nasal spray 1 spray into each nostril 2 (two) times a day Use in each nostril as directed   • Carboxymethylcellulose Sod PF 0 25 % SOLN INSTILL ONE DROP BOTH EYES FOUR TIMES A DAY FOR DRY EYES   • Cholecalciferol 2000 units CAPS Take 2,000 Units by mouth   • dextran 70-hypromellose (GENTEAL TEARS) 0 1-0 3 % ophthalmic solution Apply 1 drop to eye   • ketorolac (ACULAR) 0 5 % ophthalmic solution INSTILL 1 DROP INTO RIGHT EYE FOUR TIMES A DAY FOR EYE INFLAMMATION USE AS DIRECTED   • ketotifen (ZADITOR) 0 025 % ophthalmic solution INSTILL ONE DROP BOTH EYES TWICE A DAY   • leflunomide (ARAVA) 20 MG tablet Take 1 tablet (20 mg total) by mouth daily   • mirtazapine (REMERON) 30 mg tablet    • ofloxacin (OCUFLOX) 0 3 % ophthalmic solution INSTILL 1 DROP INTO RIGHT EYE FOUR TIMES A DAY FOR EYE INFECTION USE AS DIRECTED   • polyvinyl alcohol (LIQUIFILM TEARS) 1 4 % ophthalmic solution Apply 1 drop to eye   • prednisoLONE acetate (PRED FORTE) 1 % ophthalmic suspension INSTILL 1 DROP RIGHT EYE FOUR TIMES A DAY FOR EYE INFLAMMATION USE AS DIRECTED   • sertraline (ZOLOFT) 100 mg tablet Take 100 mg by mouth daily    • SODIUM FLUORIDE, DENTAL GEL, 1 1 % GEL APPLY SMALL AMOUNT TO BY MOUTH DAILY   • sulfaSALAzine (AZULFIDINE) 500 mg tablet Take 2 tabs twice a day   • triamcinolone (KENALOG) 0 1 % cream Apply topically 2 (two) times a day     Allergies   Allergen Reactions   • Cefadroxil Other (See Comments)     bleeding internally   • Midazolam Other (See Comments)     ARRHYTHMIA   • Prazosin    • Fentanyl Palpitations     Immunization History   Administered Date(s) Administered   • COVID-19 MODERNA VACC 0 5 ML IM 03/04/2021, 11/24/2021   • COVID-19, unspecified 02/04/2021, 03/04/2021   • INFLUENZA 11/27/2006, 11/05/2007, 10/20/2008, 09/22/2009, 10/29/2010, 10/26/2011, 10/09/2012, 02/05/2013, 09/30/2013, 10/15/2014, 10/20/2016, 10/01/2018, 10/21/2019, 10/15/2020   • Influenza Quadrivalent Preservative Free 3 years and older IM 10/01/2018, 10/21/2019, 10/15/2020   • Influenza, Seasonal Vaccine, Quadrivalent, Adjuvanted,  5e 09/30/2022   • Influenza, high dose seasonal 0 7 mL 09/20/2021   • Influenza, injectable, quadrivalent, preservative free 0 5 mL 10/01/2018, 10/21/2019, 10/15/2020   • Influenza, seasonal, injectable, preservative free 10/20/2015, 10/19/2016, 10/11/2017   • Pneumococcal 11/05/2007, 11/03/2015   • Pneumococcal Conjugate 13-Valent 01/19/2016   • Pneumococcal Conjugate PCV 7 11/03/2015   • Pneumococcal Polysaccharide PPV23 09/05/2017   • Td (adult), Unspecified 09/18/2008   • Tdap 06/02/2014, 05/31/2020   • Zoster 11/03/2013, 03/31/2015   • Zoster Vaccine Recombinant 08/07/2020       Objective     /70 (BP Location: Left arm, Patient Position: Sitting, Cuff Size: Adult)   Pulse 82   Temp (!) 96 8 °F (36 °C) (Tympanic)   Resp 16   Ht 5' 10" (1 778 m)   Wt 67 6 kg (149 lb)   SpO2 96%   BMI 21 38 kg/m²     Physical Exam  Vitals and nursing note reviewed  Constitutional:       Appearance: He is well-developed  HENT:      Head: Normocephalic and atraumatic  Eyes:      Pupils: Pupils are equal, round, and reactive to light  Cardiovascular:      Rate and Rhythm: Normal rate and regular rhythm  Heart sounds: Normal heart sounds  Pulmonary:      Effort: Pulmonary effort is normal       Breath sounds: Normal breath sounds  Abdominal:      General: Bowel sounds are normal       Palpations: Abdomen is soft  Musculoskeletal:         General: Normal range of motion  Cervical back: Normal range of motion and neck supple  Lymphadenopathy:      Cervical: No cervical adenopathy  Skin:     General: Skin is warm  Findings: No rash  Neurological:      Mental Status: He is alert and oriented to person, place, and time  Cranial Nerves: No cranial nerve deficit         Aubrie Magallanes MD

## 2023-02-28 ENCOUNTER — APPOINTMENT (OUTPATIENT)
Dept: LAB | Age: 73
End: 2023-02-28

## 2023-02-28 DIAGNOSIS — Z12.5 PROSTATE CANCER SCREENING: ICD-10-CM

## 2023-02-28 DIAGNOSIS — I10 ESSENTIAL HYPERTENSION: ICD-10-CM

## 2023-02-28 DIAGNOSIS — E78.2 MIXED HYPERLIPIDEMIA: ICD-10-CM

## 2023-02-28 DIAGNOSIS — R73.9 HYPERGLYCEMIA: ICD-10-CM

## 2023-02-28 DIAGNOSIS — E21.3 HYPERPARATHYROIDISM (HCC): ICD-10-CM

## 2023-02-28 LAB
ALBUMIN SERPL BCP-MCNC: 3.7 G/DL (ref 3.5–5)
ALP SERPL-CCNC: 81 U/L (ref 46–116)
ALT SERPL W P-5'-P-CCNC: 33 U/L (ref 12–78)
ANION GAP SERPL CALCULATED.3IONS-SCNC: 0 MMOL/L (ref 4–13)
AST SERPL W P-5'-P-CCNC: 30 U/L (ref 5–45)
BASOPHILS # BLD AUTO: 0.07 THOUSANDS/ÂΜL (ref 0–0.1)
BASOPHILS NFR BLD AUTO: 2 % (ref 0–1)
BILIRUB SERPL-MCNC: 0.34 MG/DL (ref 0.2–1)
BUN SERPL-MCNC: 16 MG/DL (ref 5–25)
CALCIUM SERPL-MCNC: 9.6 MG/DL (ref 8.3–10.1)
CHLORIDE SERPL-SCNC: 112 MMOL/L (ref 96–108)
CHOLEST SERPL-MCNC: 192 MG/DL
CO2 SERPL-SCNC: 30 MMOL/L (ref 21–32)
CREAT SERPL-MCNC: 0.86 MG/DL (ref 0.6–1.3)
EOSINOPHIL # BLD AUTO: 0.42 THOUSAND/ÂΜL (ref 0–0.61)
EOSINOPHIL NFR BLD AUTO: 10 % (ref 0–6)
ERYTHROCYTE [DISTWIDTH] IN BLOOD BY AUTOMATED COUNT: 11.8 % (ref 11.6–15.1)
EST. AVERAGE GLUCOSE BLD GHB EST-MCNC: 88 MG/DL
GFR SERPL CREATININE-BSD FRML MDRD: 85 ML/MIN/1.73SQ M
GLUCOSE P FAST SERPL-MCNC: 98 MG/DL (ref 65–99)
HBA1C MFR BLD: 4.7 %
HCT VFR BLD AUTO: 41.3 % (ref 36.5–49.3)
HDLC SERPL-MCNC: 81 MG/DL
HGB BLD-MCNC: 13.1 G/DL (ref 12–17)
IMM GRANULOCYTES # BLD AUTO: 0.01 THOUSAND/UL (ref 0–0.2)
IMM GRANULOCYTES NFR BLD AUTO: 0 % (ref 0–2)
LDLC SERPL CALC-MCNC: 93 MG/DL (ref 0–100)
LYMPHOCYTES # BLD AUTO: 1.28 THOUSANDS/ÂΜL (ref 0.6–4.47)
LYMPHOCYTES NFR BLD AUTO: 29 % (ref 14–44)
MCH RBC QN AUTO: 32 PG (ref 26.8–34.3)
MCHC RBC AUTO-ENTMCNC: 31.7 G/DL (ref 31.4–37.4)
MCV RBC AUTO: 101 FL (ref 82–98)
MONOCYTES # BLD AUTO: 0.68 THOUSAND/ÂΜL (ref 0.17–1.22)
MONOCYTES NFR BLD AUTO: 16 % (ref 4–12)
NEUTROPHILS # BLD AUTO: 1.92 THOUSANDS/ÂΜL (ref 1.85–7.62)
NEUTS SEG NFR BLD AUTO: 43 % (ref 43–75)
NRBC BLD AUTO-RTO: 0 /100 WBCS
PLATELET # BLD AUTO: 195 THOUSANDS/UL (ref 149–390)
PMV BLD AUTO: 11.7 FL (ref 8.9–12.7)
POTASSIUM SERPL-SCNC: 4.5 MMOL/L (ref 3.5–5.3)
PROT SERPL-MCNC: 6.8 G/DL (ref 6.4–8.4)
PSA SERPL-MCNC: 2.2 NG/ML (ref 0–4)
PTH-INTACT SERPL-MCNC: 71.9 PG/ML (ref 18.4–80.1)
RBC # BLD AUTO: 4.09 MILLION/UL (ref 3.88–5.62)
SODIUM SERPL-SCNC: 142 MMOL/L (ref 135–147)
TRIGL SERPL-MCNC: 90 MG/DL
TSH SERPL DL<=0.05 MIU/L-ACNC: 3.06 UIU/ML (ref 0.45–4.5)
WBC # BLD AUTO: 4.38 THOUSAND/UL (ref 4.31–10.16)

## 2023-03-24 ENCOUNTER — OFFICE VISIT (OUTPATIENT)
Dept: FAMILY MEDICINE CLINIC | Facility: CLINIC | Age: 73
End: 2023-03-24

## 2023-03-24 VITALS
RESPIRATION RATE: 16 BRPM | HEIGHT: 70 IN | OXYGEN SATURATION: 94 % | DIASTOLIC BLOOD PRESSURE: 68 MMHG | TEMPERATURE: 97.1 F | HEART RATE: 78 BPM | WEIGHT: 153 LBS | BODY MASS INDEX: 21.9 KG/M2 | SYSTOLIC BLOOD PRESSURE: 132 MMHG

## 2023-03-24 DIAGNOSIS — H25.89 OTHER AGE-RELATED CATARACT OF RIGHT EYE: ICD-10-CM

## 2023-03-24 DIAGNOSIS — E21.3 HYPERPARATHYROIDISM (HCC): ICD-10-CM

## 2023-03-24 DIAGNOSIS — L40.50 PSORIATIC ARTHRITIS (HCC): ICD-10-CM

## 2023-03-24 DIAGNOSIS — F32.5 MAJOR DEPRESSIVE DISORDER, SINGLE EPISODE IN FULL REMISSION (HCC): ICD-10-CM

## 2023-03-24 DIAGNOSIS — F32.A DEPRESSIVE DISORDER: ICD-10-CM

## 2023-03-24 DIAGNOSIS — K21.9 GASTROESOPHAGEAL REFLUX DISEASE WITHOUT ESOPHAGITIS: Primary | ICD-10-CM

## 2023-03-24 DIAGNOSIS — E78.2 MIXED HYPERLIPIDEMIA: ICD-10-CM

## 2023-03-24 DIAGNOSIS — R73.9 HYPERGLYCEMIA: ICD-10-CM

## 2023-03-24 DIAGNOSIS — J41.0 SIMPLE CHRONIC BRONCHITIS (HCC): ICD-10-CM

## 2023-03-24 DIAGNOSIS — E44.1 MILD PROTEIN-CALORIE MALNUTRITION (HCC): ICD-10-CM

## 2023-03-24 DIAGNOSIS — I10 ESSENTIAL HYPERTENSION: ICD-10-CM

## 2023-03-24 NOTE — ASSESSMENT & PLAN NOTE
Controlled  Fasting glucose and A1c are stable  Continue low fat diet and regular exercise  Repeat labs and continue to monitor    Glucose, Fasting 65 - 99 mg/dL 98  96 CM

## 2023-03-24 NOTE — ASSESSMENT & PLAN NOTE
Well controlled  Continue atorvastatin  Repeat labs and continue to monitor  Hemoglobin A1C Normal 3 8-5 6%; PreDiabetic 5 7-6 4%;  Diabetic >=6 5%; Glycemic control for adults with diabetes <7 0% % 4 7  4 9     Cholesterol See Comment mg/dL 192  209 High  CM  197 CM  195 R, CM    Triglycerides See Comment mg/dL 90  102 CM  113 CM  102 R, CM    HDL, Direct >=40 mg/dL 81  82 CM  73 CM  80 CM    LDL Calculated 0 - 100 mg/dL 93  107 High  CM  101 High  CM  95 CM

## 2023-03-24 NOTE — PROGRESS NOTES
Name: Desiree Jolly      : 1950      MRN: [id number]  Encounter Provider: Yang Jauregui MD  Encounter Date: 3/24/2023   Encounter department: [address] [place] [address]  Gastroesophageal reflux disease without esophagitis  Assessment & Plan:  Stable, no symptoms at this time  Continue to monitor  2  Hyperparathyroidism ([address] )  Assessment & Plan:  Well controlled, PTH is normal  Continue to monitor  PTH 18 4 - 80 1 pg/mL 71 9            3  Mixed hyperlipidemia  Assessment & Plan:  Well controlled  Continue atorvastatin  Repeat labs and continue to monitor  Hemoglobin A1C Normal 3 8-5 6%; PreDiabetic 5 7-6 4%; Diabetic >=6 5%; Glycemic control for adults with diabetes <7 0% % 4 7  4 9     Cholesterol See Comment mg/dL 192  209 High  CM  197 CM  195 R, CM    Triglycerides See Comment mg/dL 90  102 CM  113 CM  102 R, CM    HDL, Direct >=40 mg/dL 81  82 CM  73 CM  80 CM    LDL Calculated 0 - 100 mg/dL 93  107 High  CM  101 High  CM  95 CM            Orders:  -     CBC and differential; Future  -     Comprehensive metabolic panel; Future  -     Lipid Panel with Direct LDL reflex; Future  -     TSH, 3rd generation with Free T4 reflex; Future    4  Depressive disorder  Assessment & Plan:  Stable, no symptoms reported at this time  Continue medication regiment  Continue to monitor  5  Hyperglycemia  Assessment & Plan:  Controlled  Fasting glucose and A1c are stable  Continue low fat diet and regular exercise  Repeat labs and continue to monitor  Glucose, Fasting 65 - 99 mg/dL 98  96 CM          Orders:  -     Hemoglobin A1C; Future    6  Other age-related cataract of right eye  Assessment & Plan:  Cataract surgery of right eye performed on  without complications  Mild headaches since he needs to get used to wearing glasses  Continue to monitor  7  Essential hypertension  Assessment & Plan:  Well controlled BP is 132/68 today   Continue medication regiment  Continue to monitor  Orders:  -     CBC and differential; Future  -     Comprehensive metabolic panel; Future  -     Lipid Panel with Direct LDL reflex; Future  -     TSH, 3rd generation with Free T4 reflex; Future    8  Mild protein-calorie malnutrition ([facility] [address] )    9  Psoriatic arthritis ([facility] [address] )    10  Simple chronic bronchitis ([facility] [address] )    11  Major depressive disorder, single episode in full remission [facility])           Doug Baron is a 74year old male with PMX of gerd, depression/anxiety, HLD, HTN, and hyperparathyroidism coming in to discuss multiple medical problems and his recent blood work  He does not have any concerns today is taking his medications as prescribed and denies any side effects  He recently underwent cataract surgery in February which went well, he denies complications  He has has some headaches since the surgery since he is adjusting to wearing glasses  His blood work all came back within normal limits  He denies chest pain, SOB, fevers, nausea/vomiting and edema  He has no other concerns at this time and will return in 6 months for repeat blood work  Review of Systems   Constitutional: Negative for activity change, chills, fatigue and fever  HENT: Negative for congestion, sore throat and trouble swallowing  Eyes: Negative  Respiratory: Negative for apnea, cough and shortness of breath  Cardiovascular: Negative for chest pain and leg swelling  Gastrointestinal: Negative for abdominal distention, abdominal pain, constipation, diarrhea, nausea and vomiting  Endocrine: Negative  Genitourinary: Negative  Musculoskeletal: Negative  Skin: Negative  Allergic/Immunologic: Negative  Neurological: Positive for headaches  Negative for dizziness, syncope and weakness  Hematological: Negative  Psychiatric/Behavioral: Negative for agitation, behavioral problems and confusion         Past Medical History:   Diagnosis Date   • Anxiety    • Arthritis    • Depression    • GERD (gastroesophageal reflux disease)    • Gout    • Hyperlipidemia    • Hypertension    • Kidney stone    • Psoriatic arthritis ([facility] [address] )    • PTSD (post-traumatic stress disorder)    • SVT (supraventricular tachycardia) ([facility])    • Thyroid tumor, benign      Past Surgical History:   Procedure Laterality Date   • CARDIAC ELECTROPHYSIOLOGY MAPPING AND ABLATION      SVT   • PARATHYROIDECTOMY     • PROSTATE SURGERY       Family History   Problem Relation Age of Onset   • Coronary artery disease Brother    • Hypertension Mother    • No Known Problems Father      Social History     Socioeconomic History   • Marital status:      Spouse name: None   • Number of children: None   • Years of education: None   • Highest education level: None   Occupational History   • None   Tobacco Use   • Smoking status: Never   • Smokeless tobacco: Never   Vaping Use   • Vaping Use: Never used   Substance and Sexual Activity   • Alcohol use: No   • Drug use: No   • Sexual activity: Not Currently   Other Topics Concern   • None   Social History Narrative   • None     Social Determinants of Health     Financial Resource Strain: Not on file   Food Insecurity: Not on file   Transportation Needs: Not on file   Physical Activity: Not on file   Stress: Not on file   Social Connections: Not on file   Intimate Partner Violence: Not on file   Housing Stability: Not on file     Current Outpatient Medications on File Prior to Visit   Medication Sig   • albuterol (PROVENTIL HFA,VENTOLIN HFA) 90 mcg/act inhaler Inhale 2 puffs every 6 (six) hours as needed for wheezing or shortness of breath   • atorvastatin (LIPITOR) 40 mg tablet Take 40 mg by mouth    • azelastine (ASTELIN) 0 1 % nasal spray 1 spray into each nostril 2 (two) times a day Use in each nostril as directed   • Carboxymethylcellulose Sod PF 0 25 % SOLN INSTILL ONE DROP BOTH EYES FOUR TIMES A DAY FOR DRY EYES   • Cholecalciferol 2000 units CAPS Take 2,000 Units by mouth   • dextran 70-hypromellose (GENTEAL TEARS) 0 1-0 3 % ophthalmic solution Apply 1 drop to eye   • ketorolac (ACULAR) 0 5 % ophthalmic solution INSTILL 1 DROP INTO RIGHT EYE FOUR TIMES A DAY FOR EYE INFLAMMATION USE AS DIRECTED   • ketotifen (ZADITOR) 0 025 % ophthalmic solution INSTILL ONE DROP BOTH EYES TWICE A DAY   • leflunomide (ARAVA) 20 MG tablet Take 1 tablet (20 mg total) by mouth daily   • mirtazapine (REMERON) 30 mg tablet    • ofloxacin (OCUFLOX) 0 3 % ophthalmic solution INSTILL 1 DROP INTO RIGHT EYE FOUR TIMES A DAY FOR EYE INFECTION USE AS DIRECTED   • polyvinyl alcohol (LIQUIFILM TEARS) 1 4 % ophthalmic solution Apply 1 drop to eye   • prednisoLONE acetate (PRED FORTE) 1 % ophthalmic suspension INSTILL 1 DROP RIGHT EYE FOUR TIMES A DAY FOR EYE INFLAMMATION USE AS DIRECTED   • sertraline (ZOLOFT) 100 mg tablet Take 100 mg by mouth daily    • SODIUM FLUORIDE, DENTAL GEL, 1 1 % GEL APPLY SMALL AMOUNT TO BY MOUTH DAILY   • sulfaSALAzine (AZULFIDINE) 500 mg tablet Take 2 tabs twice a day   • triamcinolone (KENALOG) 0 1 % cream Apply topically 2 (two) times a day     Allergies   Allergen Reactions   • Cefadroxil Other (See Comments)     bleeding internally   • Midazolam Other (See Comments)     ARRHYTHMIA   • Prazosin    • Fentanyl Palpitations     Immunization History   Administered Date(s) Administered   • COVID-19 MODERNA VACC 0 5 ML IM 03/04/2021, 11/24/2021   • COVID-19, unspecified 02/04/2021, 03/04/2021   • INFLUENZA 11/27/2006, 11/05/2007, 10/20/2008, 09/22/2009, 10/29/2010, 10/26/2011, 10/09/2012, 02/05/2013, 09/30/2013, 10/15/2014, 10/20/2016, 10/01/2018, 10/21/2019, 10/15/2020   • Influenza Quadrivalent Preservative Free 3 years and older IM 10/01/2018, 10/21/2019, 10/15/2020   • Influenza, Seasonal Vaccine, Quadrivalent, Adjuvanted,  5e 09/30/2022   • Influenza, high dose seasonal 0 7 mL 09/20/2021   • Influenza, injectable, quadrivalent, preservative free 0 5 mL 10/01/2018, 10/21/2019, 10/15/2020   • Influenza, seasonal, injectable, preservative free 10/20/2015, 10/19/2016, 10/11/2017   • Pneumococcal 11/05/2007, 11/03/2015   • Pneumococcal Conjugate 13-Valent 01/19/2016   • Pneumococcal Conjugate PCV 7 11/03/2015   • Pneumococcal Polysaccharide PPV23 09/05/2017   • Td (adult), Unspecified 09/18/2008   • Tdap 06/02/2014, 05/31/2020   • Zoster 11/03/2013, 03/31/2015   • Zoster Vaccine Recombinant 08/07/2020, 03/21/2023       Objective     /68 (BP Location: Left arm, Patient Position: Sitting, Cuff Size: Adult)   Pulse 78   Temp (!) 97 1 °F (36 2 °C) (Tympanic)   Resp 16   Ht 5' 10" (1 778 m)   Wt 69 4 kg (153 lb)   SpO2 94%   BMI 21 95 kg/m²     Physical Exam  Vitals and nursing note reviewed  Constitutional:       General: He is not in acute distress  Appearance: Normal appearance  HENT:      Head: Normocephalic and atraumatic  Right Ear: Tympanic membrane normal       Left Ear: Tympanic membrane normal       Nose: Nose normal       Mouth/Throat:      Mouth: Mucous membranes are moist       Pharynx: Oropharynx is clear  No oropharyngeal exudate  Eyes:      Extraocular Movements: Extraocular movements intact  Conjunctiva/sclera: Conjunctivae normal       Pupils: Pupils are equal, round, and reactive to light  Cardiovascular:      Rate and Rhythm: Normal rate and regular rhythm  Pulses: Normal pulses  Heart sounds: Normal heart sounds  No murmur heard  Pulmonary:      Effort: Pulmonary effort is normal  No respiratory distress  Breath sounds: Normal breath sounds  Abdominal:      General: Abdomen is flat  Bowel sounds are normal  There is no distension  Palpations: Abdomen is soft  Tenderness: There is no abdominal tenderness  Musculoskeletal:         General: Normal range of motion  Cervical back: Normal range of motion and neck supple  Right lower leg: No edema  Left lower leg: No edema     Skin: General: Skin is warm and dry  Capillary Refill: Capillary refill takes less than 2 seconds  Neurological:      General: No focal deficit present  Mental Status: He is alert and oriented to person, place, and time     Psychiatric:         Mood and Affect: Mood normal          Behavior: Behavior normal        Cam Oleary MD

## 2023-03-24 NOTE — ASSESSMENT & PLAN NOTE
Cataract surgery of right eye performed on February 6th without complications  Mild headaches since he needs to get used to wearing glasses  Continue to monitor

## 2023-09-21 ENCOUNTER — APPOINTMENT (OUTPATIENT)
Dept: LAB | Age: 73
End: 2023-09-21
Payer: MEDICARE

## 2023-09-21 DIAGNOSIS — I10 ESSENTIAL HYPERTENSION: ICD-10-CM

## 2023-09-21 DIAGNOSIS — E78.2 MIXED HYPERLIPIDEMIA: ICD-10-CM

## 2023-09-21 DIAGNOSIS — R73.9 HYPERGLYCEMIA: ICD-10-CM

## 2023-09-21 LAB
ALBUMIN SERPL BCP-MCNC: 3.9 G/DL (ref 3.5–5)
ALP SERPL-CCNC: 63 U/L (ref 34–104)
ALT SERPL W P-5'-P-CCNC: 20 U/L (ref 7–52)
ANION GAP SERPL CALCULATED.3IONS-SCNC: 9 MMOL/L
AST SERPL W P-5'-P-CCNC: 25 U/L (ref 13–39)
BASOPHILS # BLD AUTO: 0.05 THOUSANDS/ÂΜL (ref 0–0.1)
BASOPHILS NFR BLD AUTO: 1 % (ref 0–1)
BILIRUB SERPL-MCNC: 0.45 MG/DL (ref 0.2–1)
BUN SERPL-MCNC: 20 MG/DL (ref 5–25)
CALCIUM SERPL-MCNC: 9.5 MG/DL (ref 8.4–10.2)
CHLORIDE SERPL-SCNC: 107 MMOL/L (ref 96–108)
CHOLEST SERPL-MCNC: 160 MG/DL
CO2 SERPL-SCNC: 25 MMOL/L (ref 21–32)
CREAT SERPL-MCNC: 0.87 MG/DL (ref 0.6–1.3)
EOSINOPHIL # BLD AUTO: 0.68 THOUSAND/ÂΜL (ref 0–0.61)
EOSINOPHIL NFR BLD AUTO: 12 % (ref 0–6)
ERYTHROCYTE [DISTWIDTH] IN BLOOD BY AUTOMATED COUNT: 12.4 % (ref 11.6–15.1)
EST. AVERAGE GLUCOSE BLD GHB EST-MCNC: 103 MG/DL
GFR SERPL CREATININE-BSD FRML MDRD: 84 ML/MIN/1.73SQ M
GLUCOSE P FAST SERPL-MCNC: 95 MG/DL (ref 65–99)
HBA1C MFR BLD: 5.2 %
HCT VFR BLD AUTO: 41.6 % (ref 36.5–49.3)
HDLC SERPL-MCNC: 80 MG/DL
HGB BLD-MCNC: 13.6 G/DL (ref 12–17)
IMM GRANULOCYTES # BLD AUTO: 0.01 THOUSAND/UL (ref 0–0.2)
IMM GRANULOCYTES NFR BLD AUTO: 0 % (ref 0–2)
LDLC SERPL CALC-MCNC: 67 MG/DL (ref 0–100)
LYMPHOCYTES # BLD AUTO: 1.21 THOUSANDS/ÂΜL (ref 0.6–4.47)
LYMPHOCYTES NFR BLD AUTO: 22 % (ref 14–44)
MCH RBC QN AUTO: 31.8 PG (ref 26.8–34.3)
MCHC RBC AUTO-ENTMCNC: 32.7 G/DL (ref 31.4–37.4)
MCV RBC AUTO: 97 FL (ref 82–98)
MONOCYTES # BLD AUTO: 0.88 THOUSAND/ÂΜL (ref 0.17–1.22)
MONOCYTES NFR BLD AUTO: 16 % (ref 4–12)
NEUTROPHILS # BLD AUTO: 2.7 THOUSANDS/ÂΜL (ref 1.85–7.62)
NEUTS SEG NFR BLD AUTO: 49 % (ref 43–75)
NRBC BLD AUTO-RTO: 0 /100 WBCS
PLATELET # BLD AUTO: 173 THOUSANDS/UL (ref 149–390)
PMV BLD AUTO: 11.9 FL (ref 8.9–12.7)
POTASSIUM SERPL-SCNC: 4.1 MMOL/L (ref 3.5–5.3)
PROT SERPL-MCNC: 6.6 G/DL (ref 6.4–8.4)
RBC # BLD AUTO: 4.28 MILLION/UL (ref 3.88–5.62)
SODIUM SERPL-SCNC: 141 MMOL/L (ref 135–147)
TRIGL SERPL-MCNC: 67 MG/DL
TSH SERPL DL<=0.05 MIU/L-ACNC: 1.44 UIU/ML (ref 0.45–4.5)
WBC # BLD AUTO: 5.53 THOUSAND/UL (ref 4.31–10.16)

## 2023-09-21 PROCEDURE — 84443 ASSAY THYROID STIM HORMONE: CPT

## 2023-09-21 PROCEDURE — 83036 HEMOGLOBIN GLYCOSYLATED A1C: CPT

## 2023-09-21 PROCEDURE — 80061 LIPID PANEL: CPT

## 2023-09-21 PROCEDURE — 85025 COMPLETE CBC W/AUTO DIFF WBC: CPT

## 2023-09-21 PROCEDURE — 80053 COMPREHEN METABOLIC PANEL: CPT

## 2023-09-21 PROCEDURE — 36415 COLL VENOUS BLD VENIPUNCTURE: CPT

## 2023-09-29 ENCOUNTER — OFFICE VISIT (OUTPATIENT)
Dept: FAMILY MEDICINE CLINIC | Facility: CLINIC | Age: 73
End: 2023-09-29
Payer: MEDICARE

## 2023-09-29 VITALS
HEIGHT: 70 IN | BODY MASS INDEX: 20.76 KG/M2 | DIASTOLIC BLOOD PRESSURE: 78 MMHG | RESPIRATION RATE: 16 BRPM | OXYGEN SATURATION: 100 % | WEIGHT: 145 LBS | TEMPERATURE: 97.3 F | HEART RATE: 81 BPM | SYSTOLIC BLOOD PRESSURE: 122 MMHG

## 2023-09-29 DIAGNOSIS — R73.9 HYPERGLYCEMIA: ICD-10-CM

## 2023-09-29 DIAGNOSIS — Z12.5 PROSTATE CANCER SCREENING: ICD-10-CM

## 2023-09-29 DIAGNOSIS — E78.2 MIXED HYPERLIPIDEMIA: ICD-10-CM

## 2023-09-29 DIAGNOSIS — I10 ESSENTIAL HYPERTENSION: ICD-10-CM

## 2023-09-29 DIAGNOSIS — Z23 ENCOUNTER FOR IMMUNIZATION: ICD-10-CM

## 2023-09-29 DIAGNOSIS — Z00.00 MEDICARE ANNUAL WELLNESS VISIT, SUBSEQUENT: ICD-10-CM

## 2023-09-29 DIAGNOSIS — J30.89 NON-SEASONAL ALLERGIC RHINITIS DUE TO OTHER ALLERGIC TRIGGER: Primary | ICD-10-CM

## 2023-09-29 PROCEDURE — G0008 ADMIN INFLUENZA VIRUS VAC: HCPCS

## 2023-09-29 PROCEDURE — 90662 IIV NO PRSV INCREASED AG IM: CPT

## 2023-09-29 PROCEDURE — 99214 OFFICE O/P EST MOD 30 MIN: CPT | Performed by: FAMILY MEDICINE

## 2023-09-29 PROCEDURE — G0439 PPPS, SUBSEQ VISIT: HCPCS | Performed by: FAMILY MEDICINE

## 2023-09-29 RX ORDER — PREDNISONE 50 MG/1
50 TABLET ORAL DAILY
Qty: 5 TABLET | Refills: 0 | Status: SHIPPED | OUTPATIENT
Start: 2023-09-29 | End: 2023-10-04

## 2023-09-29 NOTE — ASSESSMENT & PLAN NOTE
Patient requiring routine prostate cancer screening. In 6 months, he is to complete PSA lab and return for follow up visit.

## 2023-09-29 NOTE — ASSESSMENT & PLAN NOTE
Per patient, worsened allergies for past 2 weeks. Patient is to continue with albuterol inhaler as needed. He is also started on 5 day course of prednisone 50 mg. Patient will take prednisone in the morning on a full stomach. Continue to monitor. If symptoms worsen, patient can contact the office. In 6 months, patient to complete routine labs and return for follow up visit.

## 2023-09-29 NOTE — ASSESSMENT & PLAN NOTE
Well controlled per last A1C and fasting glucose. Patient advised to continue with low carb diet and regular exercise. We will continue to monitor. In 6 months, patient to complete repeat labs and return for follow up visit.

## 2023-09-29 NOTE — PATIENT INSTRUCTIONS
Medicare Preventive Visit Patient Instructions  Thank you for completing your Welcome to Medicare Visit or Medicare Annual Wellness Visit today. Your next wellness visit will be due in one year (9/29/2024). The screening/preventive services that you may require over the next 5-10 years are detailed below. Some tests may not apply to you based off risk factors and/or age. Screening tests ordered at today's visit but not completed yet may show as past due. Also, please note that scanned in results may not display below. Preventive Screenings:  Service Recommendations Previous Testing/Comments   Colorectal Cancer Screening  · Colonoscopy    · Fecal Occult Blood Test (FOBT)/Fecal Immunochemical Test (FIT)  · Fecal DNA/Cologuard Test  · Flexible Sigmoidoscopy Age: 43-73 years old   Colonoscopy: every 10 years (May be performed more frequently if at higher risk)  OR  FOBT/FIT: every 1 year  OR  Cologuard: every 3 years  OR  Sigmoidoscopy: every 5 years  Screening may be recommended earlier than age 39 if at higher risk for colorectal cancer. Also, an individualized decision between you and your healthcare provider will decide whether screening between the ages of 77-80 would be appropriate.  Colonoscopy: 10/27/2022  FOBT/FIT: Not on file  Cologuard: Not on file  Sigmoidoscopy: Not on file    Screening Current     Prostate Cancer Screening Individualized decision between patient and health care provider in men between ages of 53-66   Medicare will cover every 12 months beginning on the day after your 50th birthday PSA: 2.2 ng/mL     Screening Not Indicated     Hepatitis C Screening Once for adults born between 1945 and 1965  More frequently in patients at high risk for Hepatitis C Hep C Antibody: Not on file    Screening Current   Diabetes Screening 1-2 times per year if you're at risk for diabetes or have pre-diabetes Fasting glucose: 95 mg/dL (9/21/2023)  A1C: 5.2 % (9/21/2023)  Screening Current   Cholesterol Screening Once every 5 years if you don't have a lipid disorder. May order more often based on risk factors. Lipid panel: 09/21/2023  Screening Not Indicated  History Lipid Disorder      Other Preventive Screenings Covered by Medicare:  1. Abdominal Aortic Aneurysm (AAA) Screening: covered once if your at risk. You're considered to be at risk if you have a family history of AAA or a male between the age of 70-76 who smoking at least 100 cigarettes in your lifetime. 2. Lung Cancer Screening: covers low dose CT scan once per year if you meet all of the following conditions: (1) Age 48-67; (2) No signs or symptoms of lung cancer; (3) Current smoker or have quit smoking within the last 15 years; (4) You have a tobacco smoking history of at least 20 pack years (packs per day x number of years you smoked); (5) You get a written order from a healthcare provider. 3. Glaucoma Screening: covered annually if you're considered high risk: (1) You have diabetes OR (2) Family history of glaucoma OR (3)  aged 48 and older OR (3)  American aged 72 and older  3. Osteoporosis Screening: covered every 2 years if you meet one of the following conditions: (1) Have a vertebral abnormality; (2) On glucocorticoid therapy for more than 3 months; (3) Have primary hyperparathyroidism; (4) On osteoporosis medications and need to assess response to drug therapy. 5. HIV Screening: covered annually if you're between the age of 14-79. Also covered annually if you are younger than 13 and older than 72 with risk factors for HIV infection. For pregnant patients, it is covered up to 3 times per pregnancy.     Immunizations:  Immunization Recommendations   Influenza Vaccine Annual influenza vaccination during flu season is recommended for all persons aged >= 6 months who do not have contraindications   Pneumococcal Vaccine   * Pneumococcal conjugate vaccine = PCV13 (Prevnar 13), PCV15 (Vaxneuvance), PCV20 (Prevnar 20)  * Pneumococcal polysaccharide vaccine = PPSV23 (Pneumovax) Adults 2364 years old: 1-3 doses may be recommended based on certain risk factors  Adults 72 years old: 1-2 doses may be recommended based off what pneumonia vaccine you previously received   Hepatitis B Vaccine 3 dose series if at intermediate or high risk (ex: diabetes, end stage renal disease, liver disease)   Tetanus (Td) Vaccine - COST NOT COVERED BY MEDICARE PART B Following completion of primary series, a booster dose should be given every 10 years to maintain immunity against tetanus. Td may also be given as tetanus wound prophylaxis. Tdap Vaccine - COST NOT COVERED BY MEDICARE PART B Recommended at least once for all adults. For pregnant patients, recommended with each pregnancy. Shingles Vaccine (Shingrix) - COST NOT COVERED BY MEDICARE PART B  2 shot series recommended in those aged 48 and above     Health Maintenance Due:      Topic Date Due   • Colorectal Cancer Screening  10/27/2027   • Hepatitis C Screening  Completed     Immunizations Due:      Topic Date Due   • Hepatitis A Vaccine (1 of 2 - Risk 2-dose series) Never done   • Hepatitis B Vaccine (1 of 3 - Risk 3-dose series) Never done   • COVID-19 Vaccine (5 - Booster) 01/19/2022   • Influenza Vaccine (1) 09/01/2023     Advance Directives   What are advance directives? Advance directives are legal documents that state your wishes and plans for medical care. These plans are made ahead of time in case you lose your ability to make decisions for yourself. Advance directives can apply to any medical decision, such as the treatments you want, and if you want to donate organs. What are the types of advance directives? There are many types of advance directives, and each state has rules about how to use them. You may choose a combination of any of the following:  · Living will: This is a written record of the treatment you want.  You can also choose which treatments you do not want, which to limit, and which to stop at a certain time. This includes surgery, medicine, IV fluid, and tube feedings. · Durable power of  for [facility]): This is a written record that states who you want to make healthcare choices for you when you are unable to make them for yourself. This person, called a proxy, is usually a family member or a friend. You may choose more than 1 proxy. · Do not resuscitate (DNR) order:  A DNR order is used in case your heart stops beating or you stop breathing. It is a request not to have certain forms of treatment, such as CPR. A DNR order may be included in other types of advance directives. · Medical directive: This covers the care that you want if you are in a coma, near death, or unable to make decisions for yourself. You can list the treatments you want for each condition. Treatment may include pain medicine, surgery, blood transfusions, dialysis, IV or tube feedings, and a ventilator (breathing machine). · Values history: This document has questions about your views, beliefs, and how you feel and think about life. This information can help others choose the care that you would choose. Why are advance directives important? An advance directive helps you control your care. Although spoken wishes may be used, it is better to have your wishes written down. Spoken wishes can be misunderstood, or not followed. Treatments may be given even if you do not want them. An advance directive may make it easier for your family to make difficult choices about your care. © Copyright [employer] 2018 Information is for End User's use only and may not be sold, redistributed or otherwise used for commercial purposes.  All illustrations and images included in CareNotes® are the copyrighted property of [employer] or [employer]

## 2023-09-29 NOTE — PROGRESS NOTES
Name: Ankit Price      : 1950      MRN: [id number]  Encounter Provider: Amanda Starr MD  Encounter Date: 2023   Encounter department: [facility]     1. Non-seasonal allergic rhinitis due to other allergic trigger  Assessment & Plan:  Per patient, worsened allergies for past 2 weeks. Patient is to continue with albuterol inhaler as needed. He is also started on 5 day course of prednisone 50 mg. Patient will take prednisone in the morning on a full stomach. Continue to monitor. If symptoms worsen, patient can contact the office. In 6 months, patient to complete routine labs and return for follow up visit. Orders:  -     predniSONE 50 mg tablet; Take 1 tablet (50 mg total) by mouth daily for 5 days    2. Mixed hyperlipidemia  Assessment & Plan:  Per lipid panel on , well controlled on atorvastatin 40 mg daily. Patient to continue with the same. We will continue to monitor fasting lipid profile. In 6 months, patient to complete repeat labs and return for follow up visit. Orders:  -     CBC and differential; Future  -     Comprehensive metabolic panel; Future  -     Lipid Panel with Direct LDL reflex; Future  -     TSH, 3rd generation with Free T4 reflex; Future    3. Hyperglycemia  Assessment & Plan:  Well controlled per last A1C and fasting glucose. Patient advised to continue with low carb diet and regular exercise. We will continue to monitor. In 6 months, patient to complete repeat labs and return for follow up visit. 4. Essential hypertension  Assessment & Plan:  /78 in office today. Well controlled without medications. Continue to monitor. In 6 months, patient to complete repeat labs and return for follow up visit. Orders:  -     CBC and differential; Future  -     Comprehensive metabolic panel; Future  -     Lipid Panel with Direct LDL reflex; Future  -     TSH, 3rd generation with Free T4 reflex; Future    5. Prostate cancer screening  Assessment & Plan:  Patient requiring routine prostate cancer screening. In 6 months, he is to complete PSA lab and return for follow up visit. Orders:  -     PSA, Total Screen; Future    6. Encounter for immunization  Assessment & Plan:  Patient requested and received flu shot in office today 09/29. Patient advised of side effects. If patient has any concerns, he can contact the office. Patient to return for follow up visit in 6 months. Orders:  -     influenza vaccine, high-dose, PF 0.7 mL (FLUZONE HIGH-DOSE)    7. Medicare annual wellness visit, subsequent  Assessment & Plan: It was discussed about immunizations, diet, exercise and safety measures. Subjective     MR is a 74year old male with a PMH of HTN, HLD, hyperglycemia, MDD, and psoriatic arthritis who presents to the office for annual wellness visit. Patient reports that his only acute concerns are seasonal allergies. Patient states that he is having runny nose, congestion, cough with phlegm, and wheezing. He reports that he has been using his albuterol inhaler nightly for the past 2 weeks. Patient states that his blood pressure has been controlled without any medication. He is otherwise taking all of his medications for his chronic conditions without any side effects. He reports that he is up to date with the eye doctor and dentist. Patient completed his labs for the visit. He requests a flu shot today. Review of Systems   Constitutional: Negative for chills, fatigue and fever. HENT: Positive for congestion and rhinorrhea. Negative for ear pain, sinus pain and sore throat. Eyes: Negative for visual disturbance. Respiratory: Positive for cough and wheezing. Negative for shortness of breath. Cardiovascular: Negative for chest pain, palpitations and leg swelling. Gastrointestinal: Negative for abdominal pain, diarrhea, nausea and vomiting.    Endocrine: Negative for polydipsia, polyphagia and polyuria. Genitourinary: Negative for dysuria and hematuria. Musculoskeletal: Negative for arthralgias and myalgias. Neurological: Negative for dizziness, syncope and headaches. Past Medical History:   Diagnosis Date   • Anxiety    • Arthritis    • Depression    • GERD (gastroesophageal reflux disease)    • Gout    • Hyperlipidemia    • Hypertension    • Kidney stone    • Psoriatic arthritis ([address])    • PTSD (post-traumatic stress disorder)    • SVT (supraventricular tachycardia) (HCC)    • Thyroid tumor, benign      Past Surgical History:   Procedure Laterality Date   • CARDIAC ELECTROPHYSIOLOGY MAPPING AND ABLATION      SVT   • PARATHYROIDECTOMY     • PROSTATE SURGERY       Family History   Problem Relation Age of Onset   • Coronary artery disease Brother    • Hypertension Mother    • No Known Problems Father      Social History     Socioeconomic History   • Marital status:      Spouse name: None   • Number of children: None   • Years of education: None   • Highest education level: None   Occupational History   • None   Tobacco Use   • Smoking status: Never   • Smokeless tobacco: Never   Vaping Use   • Vaping Use: Never used   Substance and Sexual Activity   • Alcohol use: No   • Drug use: No   • Sexual activity: Not Currently   Other Topics Concern   • None   Social History Narrative   • None     Social Determinants of Health     Financial Resource Strain: Low Risk  (9/29/2023)    Overall Financial Resource Strain (CARDIA)    • Difficulty of Paying Living Expenses: Not very hard   Food Insecurity: Not on file   Transportation Needs: No Transportation Needs (9/29/2023)    PRAPARE - Transportation    • Lack of Transportation (Medical): No    • Lack of Transportation (Non-Medical):  No   Physical Activity: Not on file   Stress: Not on file   Social Connections: Not on file   Intimate Partner Violence: Not on file   Housing Stability: Not on file     Current Outpatient Medications on File Prior to Visit   Medication Sig   • albuterol (PROVENTIL HFA,VENTOLIN HFA) 90 mcg/act inhaler Inhale 2 puffs every 6 (six) hours as needed for wheezing or shortness of breath   • atorvastatin (LIPITOR) 40 mg tablet Take 40 mg by mouth    • azelastine (ASTELIN) 0.1 % nasal spray 1 spray into each nostril 2 (two) times a day Use in each nostril as directed   • Carboxymethylcellulose Sod PF 0.25 % SOLN INSTILL ONE DROP BOTH EYES FOUR TIMES A DAY FOR DRY EYES   • Cholecalciferol 2000 units CAPS Take 2,000 Units by mouth   • dextran 70-hypromellose (GENTEAL TEARS) 0.1-0.3 % ophthalmic solution Apply 1 drop to eye   • ketorolac (ACULAR) 0.5 % ophthalmic solution INSTILL 1 DROP INTO RIGHT EYE FOUR TIMES A DAY FOR EYE INFLAMMATION USE AS DIRECTED   • ketotifen (ZADITOR) 0.025 % ophthalmic solution INSTILL ONE DROP BOTH EYES TWICE A DAY   • leflunomide (ARAVA) 20 MG tablet Take 1 tablet (20 mg total) by mouth daily   • mirtazapine (REMERON) 30 mg tablet    • polyvinyl alcohol (LIQUIFILM TEARS) 1.4 % ophthalmic solution Apply 1 drop to eye   • prednisoLONE acetate (PRED FORTE) 1 % ophthalmic suspension INSTILL 1 DROP RIGHT EYE FOUR TIMES A DAY FOR EYE INFLAMMATION USE AS DIRECTED   • sertraline (ZOLOFT) 100 mg tablet Take 100 mg by mouth daily    • SODIUM FLUORIDE, DENTAL GEL, 1.1 % GEL APPLY SMALL AMOUNT TO BY MOUTH DAILY   • sulfaSALAzine (AZULFIDINE) 500 mg tablet Take 2 tabs twice a day   • ofloxacin (OCUFLOX) 0.3 % ophthalmic solution INSTILL 1 DROP INTO RIGHT EYE FOUR TIMES A DAY FOR EYE INFECTION USE AS DIRECTED (Patient not taking: Reported on 9/29/2023)   • triamcinolone (KENALOG) 0.1 % cream Apply topically 2 (two) times a day (Patient not taking: Reported on 9/29/2023)     Allergies   Allergen Reactions   • Cefadroxil Other (See Comments)     bleeding internally   • Midazolam Other (See Comments)     ARRHYTHMIA   • Prazosin    • Fentanyl Palpitations     Immunization History   Administered Date(s) Administered   • COVID-19 MODERNA VACC 0.5 ML IM 03/04/2021, 11/24/2021   • COVID-19, unspecified 02/04/2021, 03/04/2021   • INFLUENZA 11/27/2006, 11/05/2007, 10/20/2008, 09/22/2009, 10/29/2010, 10/26/2011, 10/09/2012, 02/05/2013, 09/30/2013, 10/15/2014, 10/20/2016, 10/01/2018, 10/21/2019, 10/15/2020   • Influenza Quadrivalent Preservative Free 3 years and older IM 10/01/2018, 10/21/2019, 10/15/2020   • Influenza, Seasonal Vaccine, Quadrivalent, Adjuvanted, .5e 09/30/2022   • Influenza, high dose seasonal 0.7 mL 09/20/2021, 09/29/2023   • Influenza, injectable, quadrivalent, preservative free 0.5 mL 10/01/2018, 10/21/2019, 10/15/2020   • Influenza, seasonal, injectable, preservative free 10/20/2015, 10/19/2016, 10/11/2017   • Pneumococcal 11/05/2007, 11/03/2015   • Pneumococcal Conjugate 13-Valent 01/19/2016   • Pneumococcal Conjugate PCV 7 11/03/2015   • Pneumococcal Polysaccharide PPV23 09/05/2017   • Td (adult), Unspecified 09/18/2008   • Tdap 06/02/2014, 05/31/2020   • Zoster 11/03/2013, 03/31/2015   • Zoster Vaccine Recombinant 08/07/2020, 03/21/2023       Objective     /78 (BP Location: Left arm, Patient Position: Sitting, Cuff Size: Adult)   Pulse 81   Temp (!) 97.3 °F (36.3 °C) (Tympanic)   Resp 16   Ht 5' 10" (1.778 m)   Wt 65.8 kg (145 lb)   SpO2 100%   BMI 20.81 kg/m²     Physical Exam  Vitals and nursing note reviewed. Constitutional:       General: He is not in acute distress. Appearance: Normal appearance. He is normal weight. He is not ill-appearing. HENT:      Head: Normocephalic and atraumatic. Right Ear: Tympanic membrane and external ear normal. There is impacted cerumen. Left Ear: Tympanic membrane and external ear normal. There is no impacted cerumen. Nose: Nose normal. No congestion or rhinorrhea. Mouth/Throat:      Mouth: Mucous membranes are moist.      Pharynx: No oropharyngeal exudate or posterior oropharyngeal erythema.    Eyes:      Extraocular Movements: Extraocular movements intact. Conjunctiva/sclera: Conjunctivae normal.      Pupils: Pupils are equal, round, and reactive to light. Cardiovascular:      Rate and Rhythm: Normal rate and regular rhythm. Pulses: Normal pulses. Heart sounds: Normal heart sounds. No murmur heard. No friction rub. No gallop. Pulmonary:      Effort: Pulmonary effort is normal. No respiratory distress. Breath sounds: Normal breath sounds. Abdominal:      General: Abdomen is flat. There is no distension. Palpations: Abdomen is soft. There is no mass. Musculoskeletal:         General: Normal range of motion. Cervical back: Normal range of motion and neck supple. Right lower leg: No edema. Left lower leg: No edema. Skin:     General: Skin is warm and dry. Findings: No erythema or lesion. Neurological:      General: No focal deficit present. Mental Status: He is alert and oriented to person, place, and time.    Psychiatric:         Mood and Affect: Mood normal.         Behavior: Behavior normal.       Neymar Cullen MD

## 2023-09-29 NOTE — ASSESSMENT & PLAN NOTE
Per lipid panel on 09/21, well controlled on atorvastatin 40 mg daily. Patient to continue with the same. We will continue to monitor fasting lipid profile. In 6 months, patient to complete repeat labs and return for follow up visit.

## 2023-09-29 NOTE — ASSESSMENT & PLAN NOTE
/78 in office today. Well controlled without medications. Continue to monitor. In 6 months, patient to complete repeat labs and return for follow up visit.

## 2023-09-29 NOTE — PROGRESS NOTES
Assessment and Plan:     Problem List Items Addressed This Visit        Respiratory    Allergic rhinitis - Primary     Per patient, worsened allergies for past 2 weeks. Patient is to continue with albuterol inhaler as needed. He is also started on 5 day course of prednisone 50 mg. Patient will take prednisone in the morning on a full stomach. Continue to monitor. If symptoms worsen, patient can contact the office. In 6 months, patient to complete routine labs and return for follow up visit. Relevant Medications    predniSONE 50 mg tablet       Cardiovascular and Mediastinum    Essential hypertension     /78 in office today. Well controlled without medications. Continue to monitor. In 6 months, patient to complete repeat labs and return for follow up visit. Relevant Orders    CBC and differential    Comprehensive metabolic panel    Lipid Panel with Direct LDL reflex    TSH, 3rd generation with Free T4 reflex       Other    Mixed hyperlipidemia     Per lipid panel on 09/21, well controlled on atorvastatin 40 mg daily. Patient to continue with the same. We will continue to monitor fasting lipid profile. In 6 months, patient to complete repeat labs and return for follow up visit. Relevant Orders    CBC and differential    Comprehensive metabolic panel    Lipid Panel with Direct LDL reflex    TSH, 3rd generation with Free T4 reflex    Hyperglycemia     Well controlled per last A1C and fasting glucose. Patient advised to continue with low carb diet and regular exercise. We will continue to monitor. In 6 months, patient to complete repeat labs and return for follow up visit. Prostate cancer screening     Patient requiring routine prostate cancer screening. In 6 months, he is to complete PSA lab and return for follow up visit. Relevant Orders    PSA, Total Screen    Encounter for immunization     Patient requested and received flu shot in office today 09/29.  Patient advised of side effects. If patient has any concerns, he can contact the office. Patient to return for follow up visit in 6 months. Relevant Orders    influenza vaccine, high-dose, PF 0.7 mL (FLUZONE HIGH-DOSE) (Completed)    Medicare annual wellness visit, subsequent     It was discussed about immunizations, diet, exercise and safety measures. Preventive health issues were discussed with patient, and age appropriate screening tests were ordered as noted in patient's After Visit Summary. Personalized health advice and appropriate referrals for health education or preventive services given if needed, as noted in patient's After Visit Summary.      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   • Inflammatory arthritis   • Preop examination   • Vitamin D deficiency   • Refractive amblyopia   • Retinal dot hemorrhage   • Presbyopia   • Polyp of colon   • Nonexudative senile macular degeneration of retina   • Drusen (degenerative) of macula, left eye   • Insomnia   • Essential hypertension   • Mixed hyperlipidemia   • Ganglion   • Depressive disorder   • Combined form of senile cataract   • Chronic bronchitis (HCC)   • Asthma   • Allergic rhinitis   • Allergic conjunctivitis   • Alcohol abuse   • Suicidal ideation   • Chronic seborrheic dermatitis   • Bilateral carotid artery stenosis   • Gastroesophageal reflux disease   • Benign prostatic hyperplasia with weak urinary stream   • History of hyperparathyroidism   • Acute pain of right shoulder   • Psoriasis   • Bilateral impacted cerumen   • Arthropathic psoriasis, unspecified (HCC)   • Hyperglycemia   • Chronic pain of right knee   • Post-traumatic stress disorder, chronic   • Mild protein-calorie malnutrition (HCC)   • Chronic pain syndrome   • Neck pain - Right   • Cervical radiculopathy   • Myofascial pain syndrome   • High risk medication use   • Major depressive disorder, single episode in full remission ([address])   • Cataracta   • Pre-op examination   • Prostate cancer screening   • Encounter for immunization   • Medicare annual wellness visit, subsequent      Past Medical and Surgical History:     Past Medical History:   Diagnosis Date   • Anxiety    • Arthritis    • Depression    • GERD (gastroesophageal reflux disease)    • Gout    • Hyperlipidemia    • Hypertension    • Kidney stone    • Psoriatic arthritis ([address])    • PTSD (post-traumatic stress disorder)    • SVT (supraventricular tachycardia) ([facility])    • Thyroid tumor, benign      Past Surgical History:   Procedure Laterality Date   • CARDIAC ELECTROPHYSIOLOGY MAPPING AND ABLATION      SVT   • PARATHYROIDECTOMY     • PROSTATE SURGERY        Family History:     Family History   Problem Relation Age of Onset   • Coronary artery disease Brother    • Hypertension Mother    • No Known Problems Father       Social History:     Social History     Socioeconomic History   • Marital status:      Spouse name: None   • Number of children: None   • Years of education: None   • Highest education level: None   Occupational History   • None   Tobacco Use   • Smoking status: Never   • Smokeless tobacco: Never   Vaping Use   • Vaping Use: Never used   Substance and Sexual Activity   • Alcohol use: No   • Drug use: No   • Sexual activity: Not Currently   Other Topics Concern   • None   Social History Narrative   • None     Social Determinants of Health     Financial Resource Strain: Low Risk (9/29/2023)    Overall Financial Resource Strain (CARDIA)    • Difficulty of Paying Living Expenses: Not very hard   Food Insecurity: Not on file   Transportation Needs: No Transportation Needs (9/29/2023)    PRAPARE - Transportation    • Lack of Transportation (Medical): No    • Lack of Transportation (Non-Medical):  No   Physical Activity: Not on file   Stress: Not on file   Social Connections: Not on file   Intimate Partner Violence: Not on file   Housing Stability: Not on file      Medications and Allergies:     Current Outpatient Medications   Medication Sig Dispense Refill   • albuterol (PROVENTIL HFA,VENTOLIN HFA) 90 mcg/act inhaler Inhale 2 puffs every 6 (six) hours as needed for wheezing or shortness of breath 8 g 0   • atorvastatin (LIPITOR) 40 mg tablet Take 40 mg by mouth      • azelastine (ASTELIN) 0.1 % nasal spray 1 spray into each nostril 2 (two) times a day Use in each nostril as directed 1 mL 0   • Carboxymethylcellulose Sod PF 0.25 % SOLN INSTILL ONE DROP BOTH EYES FOUR TIMES A DAY FOR DRY EYES     • Cholecalciferol 2000 units CAPS Take 2,000 Units by mouth     • dextran 70-hypromellose (GENTEAL TEARS) 0.1-0.3 % ophthalmic solution Apply 1 drop to eye     • ketorolac (ACULAR) 0.5 % ophthalmic solution INSTILL 1 DROP INTO RIGHT EYE FOUR TIMES A DAY FOR EYE INFLAMMATION USE AS DIRECTED     • ketotifen (ZADITOR) 0.025 % ophthalmic solution INSTILL ONE DROP BOTH EYES TWICE A DAY     • leflunomide (ARAVA) 20 MG tablet Take 1 tablet (20 mg total) by mouth daily 90 tablet 1   • mirtazapine (REMERON) 30 mg tablet      • polyvinyl alcohol (LIQUIFILM TEARS) 1.4 % ophthalmic solution Apply 1 drop to eye     • prednisoLONE acetate (PRED FORTE) 1 % ophthalmic suspension INSTILL 1 DROP RIGHT EYE FOUR TIMES A DAY FOR EYE INFLAMMATION USE AS DIRECTED     • predniSONE 50 mg tablet Take 1 tablet (50 mg total) by mouth daily for 5 days 5 tablet 0   • sertraline (ZOLOFT) 100 mg tablet Take 100 mg by mouth daily      • SODIUM FLUORIDE, DENTAL GEL, 1.1 % GEL APPLY SMALL AMOUNT TO BY MOUTH DAILY     • sulfaSALAzine (AZULFIDINE) 500 mg tablet Take 2 tabs twice a day 360 tablet 1   • ofloxacin (OCUFLOX) 0.3 % ophthalmic solution INSTILL 1 DROP INTO RIGHT EYE FOUR TIMES A DAY FOR EYE INFECTION USE AS DIRECTED (Patient not taking: Reported on 9/29/2023)     • triamcinolone (KENALOG) 0.1 % cream Apply topically 2 (two) times a day (Patient not taking: Reported on 9/29/2023) 80 g 3     No current facility-administered medications for this visit.      Allergies   Allergen Reactions   • Cefadroxil Other (See Comments)     bleeding internally   • Midazolam Other (See Comments)     ARRHYTHMIA   • Prazosin    • Fentanyl Palpitations      Immunizations:     Immunization History   Administered Date(s) Administered   • COVID-19 MODERNA VACC 0.5 ML IM 03/04/2021, 11/24/2021   • COVID-19, unspecified 02/04/2021, 03/04/2021   • INFLUENZA 11/27/2006, 11/05/2007, 10/20/2008, 09/22/2009, 10/29/2010, 10/26/2011, 10/09/2012, 02/05/2013, 09/30/2013, 10/15/2014, 10/20/2016, 10/01/2018, 10/21/2019, 10/15/2020   • Influenza Quadrivalent Preservative Free 3 years and older IM 10/01/2018, 10/21/2019, 10/15/2020   • Influenza, Seasonal Vaccine, Quadrivalent, Adjuvanted, .5e 09/30/2022   • Influenza, high dose seasonal 0.7 mL 09/20/2021, 09/29/2023   • Influenza, injectable, quadrivalent, preservative free 0.5 mL 10/01/2018, 10/21/2019, 10/15/2020   • Influenza, seasonal, injectable, preservative free 10/20/2015, 10/19/2016, 10/11/2017   • Pneumococcal 11/05/2007, 11/03/2015   • Pneumococcal Conjugate 13-Valent 01/19/2016   • Pneumococcal Conjugate PCV 7 11/03/2015   • Pneumococcal Polysaccharide PPV23 09/05/2017   • Td (adult), Unspecified 09/18/2008   • Tdap 06/02/2014, 05/31/2020   • Zoster 11/03/2013, 03/31/2015   • Zoster Vaccine Recombinant 08/07/2020, 03/21/2023      Health Maintenance:         Topic Date Due   • Colorectal Cancer Screening  10/27/2027   • Hepatitis C Screening  Completed         Topic Date Due   • Hepatitis A Vaccine (1 of 2 - Risk 2-dose series) Never done   • Hepatitis B Vaccine (1 of 3 - Risk 3-dose series) Never done   • COVID-19 Vaccine (5 - Booster) 01/19/2022      Medicare Screening Tests and Risk Assessments:     Néstor Seay is here for his Subsequent Wellness visit. Last Medicare Wellness visit information reviewed, patient interviewed and updates made to the record today. Health Risk Assessment:   Patient rates overall health as very good. Patient feels that their physical health rating is same. Patient is satisfied with their life. Eyesight was rated as same. Hearing was rated as same. Patient feels that their emotional and mental health rating is same. Patients states they are sometimes angry. Patient states they are sometimes unusually tired/fatigued. Pain experienced in the last 7 days has been none. Patient states that he has experienced no weight loss or gain in last 6 months. Fall Risk Screening: In the past year, patient has experienced: no history of falling in past year      Home Safety:  Patient does not have trouble with stairs inside or outside of their home. Patient has working smoke alarms and has no working carbon monoxide detector. Home safety hazards include: none. Nutrition:   Current diet is Regular. Medications:   Patient is not currently taking any over-the-counter supplements. Patient is able to manage medications. Activities of Daily Living (ADLs)/Instrumental Activities of Daily Living (IADLs):   Walk and transfer into and out of bed and chair?: Yes  Dress and groom yourself?: Yes    Bathe or shower yourself?: Yes    Feed yourself?  Yes  Do your laundry/housekeeping?: Yes  Manage your money, pay your bills and track your expenses?: Yes  Make your own meals?: Yes    Do your own shopping?: Yes    Previous Hospitalizations:   Any hospitalizations or ED visits within the last 12 months?: No Advance Care Planning:   Living will: Yes    Durable POA for healthcare: Yes    Advanced directive: Yes      Cognitive Screening:   Provider or family/friend/caregiver concerned regarding cognition?: No    PREVENTIVE SCREENINGS      Cardiovascular Screening:    General: Screening Not Indicated and History Lipid Disorder      Diabetes Screening:     General: Screening Current      Colorectal Cancer Screening:     General: Screening Current      Prostate Cancer Screening:    General: Screening Not Indicated      Osteoporosis Screening:    General: Risks and Benefits Discussed      Abdominal Aortic Aneurysm (AAA) Screening:    Risk factors include: age between 70-77 yo        General: Risks and Benefits Discussed      Lung Cancer Screening:     General: Screening Not Indicated      Hepatitis C Screening:    General: Screening Current    Screening, Brief Intervention, and Referral to Treatment (SBIRT)    Screening  Typical number of drinks in a day: 0  Typical number of drinks in a week: 0  Interpretation: Low risk drinking behavior. AUDIT-C Screenin) How often did you have a drink containing alcohol in the past year? never  2) How many drinks did you have on a typical day when you were drinking in the past year? 0  3) How often did you have 6 or more drinks on one occasion in the past year? never    AUDIT-C Score: 0  Interpretation: Score 0-3 (male): Negative screen for alcohol misuse    Single Item Drug Screening:  How often have you used an illegal drug (including marijuana) or a prescription medication for non-medical reasons in the past year? never    Single Item Drug Screen Score: 0  Interpretation: Negative screen for possible drug use disorder    Brief Intervention  Alcohol & drug use screenings were reviewed. No concerns regarding substance use disorder identified. Other Counseling Topics:   Regular weightbearing exercise and calcium and vitamin D intake. No results found.      Physical Exam: /78 (BP Location: Left arm, Patient Position: Sitting, Cuff Size: Adult)   Pulse 81   Temp (!) 97.3 °F (36.3 °C) (Tympanic)   Resp 16   Ht 5' 10" (1.778 m)   Wt 65.8 kg (145 lb)   SpO2 100%   BMI 20.81 kg/m²     Physical Exam     Sanchez Ruiz MD

## 2023-09-29 NOTE — ASSESSMENT & PLAN NOTE
Patient requested and received flu shot in office today 09/29. Patient advised of side effects. If patient has any concerns, he can contact the office. Patient to return for follow up visit in 6 months.

## 2023-11-28 PROBLEM — Z00.00 MEDICARE ANNUAL WELLNESS VISIT, SUBSEQUENT: Status: RESOLVED | Noted: 2023-09-29 | Resolved: 2023-11-28

## 2023-11-28 PROBLEM — Z12.5 PROSTATE CANCER SCREENING: Status: RESOLVED | Noted: 2023-09-29 | Resolved: 2023-11-28

## 2023-12-27 ENCOUNTER — RA CDI HCC (OUTPATIENT)
Dept: OTHER | Facility: HOSPITAL | Age: 73
End: 2023-12-27

## 2023-12-27 ENCOUNTER — OFFICE VISIT (OUTPATIENT)
Dept: FAMILY MEDICINE CLINIC | Facility: CLINIC | Age: 73
End: 2023-12-27
Payer: MEDICARE

## 2023-12-27 VITALS
BODY MASS INDEX: 21.47 KG/M2 | WEIGHT: 150 LBS | OXYGEN SATURATION: 96 % | HEIGHT: 70 IN | TEMPERATURE: 97.4 F | DIASTOLIC BLOOD PRESSURE: 78 MMHG | SYSTOLIC BLOOD PRESSURE: 138 MMHG | RESPIRATION RATE: 16 BRPM | HEART RATE: 86 BPM

## 2023-12-27 DIAGNOSIS — M54.31 SCIATICA OF RIGHT SIDE: Primary | ICD-10-CM

## 2023-12-27 DIAGNOSIS — M25.551 RIGHT HIP PAIN: ICD-10-CM

## 2023-12-27 PROCEDURE — 99213 OFFICE O/P EST LOW 20 MIN: CPT | Performed by: FAMILY MEDICINE

## 2023-12-27 RX ORDER — METHOCARBAMOL 500 MG/1
500 TABLET, FILM COATED ORAL
Qty: 30 TABLET | Refills: 0 | Status: SHIPPED | OUTPATIENT
Start: 2023-12-27

## 2023-12-27 RX ORDER — PREDNISONE 50 MG/1
50 TABLET ORAL DAILY
Qty: 5 TABLET | Refills: 0 | Status: SHIPPED | OUTPATIENT
Start: 2023-12-27 | End: 2024-01-01

## 2023-12-27 NOTE — PROGRESS NOTES
Previous suggestion  HCC coding opportunities       Chart reviewed, no opportunity found: CHART REVIEWED, NO OPPORTUNITY FOUND        Patients Insurance     Medicare Insurance: Medicare         used

## 2023-12-27 NOTE — PROGRESS NOTES
Name: Rodney Maldonado      : 1950      MRN: [id number]  Encounter Provider: Misty Vicente MD  Encounter Date: 2023   [facility]    Assessment & Plan     1. Sciatica of right side  Assessment & Plan:  He was given prescriptions for prednisone and Robaxin.  Discussed about possible side effects.  Discussed about stretching exercises.  He has an appointment with his orthopedic next week.    Orders:  -     methocarbamol (ROBAXIN) 500 mg tablet; Take 1 tablet (500 mg total) by mouth daily at bedtime    2. Right hip pain  -     predniSONE 50 mg tablet; Take 1 tablet (50 mg total) by mouth daily for 5 days           Subjective     He is here today with complaining of low back pain and right hip pain radiating into the right lower extremity.  Denies any weakness but complains of numbness and tingling.  Denies any recent history of injury or trauma.  Denies any urinary or fecal incontinence.      Review of Systems   Constitutional:  Negative for chills and fever.   HENT:  Negative for trouble swallowing.    Eyes:  Negative for visual disturbance.   Respiratory:  Negative for cough and shortness of breath.    Cardiovascular:  Negative for chest pain and palpitations.   Gastrointestinal:  Negative for abdominal pain, blood in stool and vomiting.   Endocrine: Negative for cold intolerance and heat intolerance.   Genitourinary:  Negative for difficulty urinating and dysuria.   Musculoskeletal:  Positive for back pain and joint swelling. Negative for gait problem.   Skin:  Negative for rash.   Neurological:  Negative for dizziness, syncope and headaches.   Hematological:  Negative for adenopathy.   Psychiatric/Behavioral:  Negative for behavioral problems.        Past Medical History:   Diagnosis Date   • Anxiety    • Arthritis    • Depression    • GERD (gastroesophageal reflux disease)    • Gout    • Hyperlipidemia    • Hypertension    • Kidney stone    • Psoriatic  arthritis (HCC)    • PTSD (post-traumatic stress disorder)    • SVT (supraventricular tachycardia)    • Thyroid tumor, benign      Past Surgical History:   Procedure Laterality Date   • CARDIAC ELECTROPHYSIOLOGY MAPPING AND ABLATION      SVT   • PARATHYROIDECTOMY     • PROSTATE SURGERY       Family History   Problem Relation Age of Onset   • Coronary artery disease Brother    • Hypertension Mother    • No Known Problems Father      Social History     Socioeconomic History   • Marital status:      Spouse name: None   • Number of children: None   • Years of education: None   • Highest education level: None   Occupational History   • None   Tobacco Use   • Smoking status: Never   • Smokeless tobacco: Never   Vaping Use   • Vaping status: Never Used   Substance and Sexual Activity   • Alcohol use: No   • Drug use: No   • Sexual activity: Not Currently   Other Topics Concern   • None   Social History Narrative   • None     Social Determinants of Health     Financial Resource Strain: Low Risk  (9/29/2023)    Overall Financial Resource Strain (CARDIA)    • Difficulty of Paying Living Expenses: Not very hard   Food Insecurity: Not on file   Transportation Needs: No Transportation Needs (9/29/2023)    PRAPARE - Transportation    • Lack of Transportation (Medical): No    • Lack of Transportation (Non-Medical): No   Physical Activity: Not on file   Stress: Not on file   Social Connections: Not on file   Intimate Partner Violence: Not on file   Housing Stability: Not on file     Current Outpatient Medications on File Prior to Visit   Medication Sig   • albuterol (PROVENTIL HFA,VENTOLIN HFA) 90 mcg/act inhaler Inhale 2 puffs every 6 (six) hours as needed for wheezing or shortness of breath   • atorvastatin (LIPITOR) 40 mg tablet Take 40 mg by mouth    • azelastine (ASTELIN) 0.1 % nasal spray 1 spray into each nostril 2 (two) times a day Use in each nostril as directed   • Carboxymethylcellulose Sod PF 0.25 % SOLN INSTILL  ONE DROP BOTH EYES FOUR TIMES A DAY FOR DRY EYES   • Cholecalciferol 2000 units CAPS Take 2,000 Units by mouth   • dextran 70-hypromellose (GENTEAL TEARS) 0.1-0.3 % ophthalmic solution Apply 1 drop to eye   • ketotifen (ZADITOR) 0.025 % ophthalmic solution INSTILL ONE DROP BOTH EYES TWICE A DAY   • leflunomide (ARAVA) 20 MG tablet Take 1 tablet (20 mg total) by mouth daily   • mirtazapine (REMERON) 30 mg tablet    • polyvinyl alcohol (LIQUIFILM TEARS) 1.4 % ophthalmic solution Apply 1 drop to eye   • sertraline (ZOLOFT) 100 mg tablet Take 100 mg by mouth daily    • sulfaSALAzine (AZULFIDINE) 500 mg tablet Take 2 tabs twice a day   • ofloxacin (OCUFLOX) 0.3 % ophthalmic solution INSTILL 1 DROP INTO RIGHT EYE FOUR TIMES A DAY FOR EYE INFECTION USE AS DIRECTED (Patient not taking: Reported on 9/29/2023)     Allergies   Allergen Reactions   • Cefadroxil Other (See Comments)     bleeding internally   • Midazolam Other (See Comments)     ARRHYTHMIA   • Prazosin    • Fentanyl Palpitations     Immunization History   Administered Date(s) Administered   • COVID-19 MODERNA VACC 0.5 ML IM 02/04/2021, 03/04/2021, 11/24/2021   • COVID-19, unspecified 02/04/2021, 03/04/2021   • INFLUENZA 11/27/2006, 11/05/2007, 10/20/2008, 09/22/2009, 10/29/2010, 10/26/2011, 10/09/2012, 02/05/2013, 09/30/2013, 10/15/2014, 10/20/2016, 10/01/2018, 10/21/2019, 10/15/2020, 09/29/2023   • Influenza Quadrivalent Preservative Free 3 years and older IM 10/01/2018, 10/21/2019, 10/15/2020   • Influenza, Seasonal Vaccine, Quadrivalent, Adjuvanted, .5e 09/30/2022   • Influenza, high dose seasonal 0.7 mL 09/20/2021, 09/29/2023   • Influenza, injectable, quadrivalent, preservative free 0.5 mL 10/01/2018, 10/21/2019, 10/15/2020   • Influenza, seasonal, injectable, preservative free 10/20/2015, 10/19/2016, 10/11/2017   • Pneumococcal 11/05/2007, 11/03/2015   • Pneumococcal Conjugate 13-Valent 01/19/2016   • Pneumococcal Conjugate PCV 7 11/03/2015   • Pneumococcal  "Polysaccharide PPV23 09/05/2017   • Td (adult), Unspecified 09/18/2008   • Tdap 06/02/2014, 05/31/2020   • Zoster 11/03/2013, 03/31/2015   • Zoster Vaccine Recombinant 08/07/2020, 03/21/2023       Objective     /78 (BP Location: Left arm, Patient Position: Sitting, Cuff Size: Adult)   Pulse 86   Temp (!) 97.4 °F (36.3 °C) (Tympanic)   Resp 16   Ht 5' 10\" (1.778 m)   Wt 68 kg (150 lb)   SpO2 96%   BMI 21.52 kg/m²     Physical Exam  Vitals and nursing note reviewed.   Constitutional:       Appearance: He is well-developed.   HENT:      Head: Normocephalic and atraumatic.   Eyes:      Pupils: Pupils are equal, round, and reactive to light.   Cardiovascular:      Rate and Rhythm: Normal rate and regular rhythm.      Heart sounds: Normal heart sounds.   Pulmonary:      Effort: Pulmonary effort is normal.      Breath sounds: Normal breath sounds.   Abdominal:      General: Bowel sounds are normal.      Palpations: Abdomen is soft.   Musculoskeletal:         General: Tenderness (low back) present.      Cervical back: Normal range of motion and neck supple.   Lymphadenopathy:      Cervical: No cervical adenopathy.   Skin:     General: Skin is warm.      Findings: No rash.   Neurological:      Mental Status: He is alert and oriented to person, place, and time.       Misty Vicente MD    "

## 2023-12-30 PROBLEM — M54.31 SCIATICA OF RIGHT SIDE: Status: ACTIVE | Noted: 2023-12-30

## 2023-12-30 PROBLEM — M25.551 RIGHT HIP PAIN: Status: ACTIVE | Noted: 2023-12-30

## 2023-12-30 NOTE — ASSESSMENT & PLAN NOTE
He was given prescriptions for prednisone and Robaxin.  Discussed about possible side effects.  Discussed about stretching exercises.  He has an appointment with his orthopedic next week.

## 2024-01-08 ENCOUNTER — APPOINTMENT (OUTPATIENT)
Dept: RADIOLOGY | Facility: MEDICAL CENTER | Age: 74
End: 2024-01-08
Payer: MEDICARE

## 2024-01-08 ENCOUNTER — OFFICE VISIT (OUTPATIENT)
Dept: PAIN MEDICINE | Facility: MEDICAL CENTER | Age: 74
End: 2024-01-08
Payer: MEDICARE

## 2024-01-08 VITALS
SYSTOLIC BLOOD PRESSURE: 150 MMHG | HEART RATE: 79 BPM | BODY MASS INDEX: 22.05 KG/M2 | HEIGHT: 70 IN | WEIGHT: 154 LBS | DIASTOLIC BLOOD PRESSURE: 84 MMHG

## 2024-01-08 DIAGNOSIS — L40.50 PSORIATIC ARTHRITIS (HCC): ICD-10-CM

## 2024-01-08 DIAGNOSIS — M54.16 LUMBAR RADICULITIS: ICD-10-CM

## 2024-01-08 DIAGNOSIS — M54.16 LUMBAR RADICULITIS: Primary | ICD-10-CM

## 2024-01-08 PROCEDURE — 72114 X-RAY EXAM L-S SPINE BENDING: CPT

## 2024-01-08 PROCEDURE — 99214 OFFICE O/P EST MOD 30 MIN: CPT | Performed by: PHYSICAL MEDICINE & REHABILITATION

## 2024-01-08 NOTE — PROGRESS NOTES
Assessment  1. Lumbar radiculitis - Right    2. Psoriatic arthritis (HCC)        Plan  Obtain lumbar spine x-rays including flexion-extension views  Initiate physical therapy  Schedule follow-up visit in 6 to 8 weeks at the conclusion of his physical therapy, if he continues with significant pain would obtain MRI of the lumbar spine at that point    My impressions and treatment recommendations were discussed in detail with the patient who verbalized understanding and had no further questions.  Discharge instructions were provided. I personally saw and examined the patient and I agree with the above discussed plan of care.    Orders Placed This Encounter   Procedures    X-ray lumbar spine complete with bending     Standing Status:   Future     Standing Expiration Date:   1/8/2028     Scheduling Instructions:      Bring along any outside films relating to this procedure.          Ambulatory referral to Physical Therapy     Standing Status:   Future     Standing Expiration Date:   1/8/2025     Referral Priority:   Routine     Referral Type:   Physical Therapy     Referral Reason:   Specialty Services Required     Requested Specialty:   Physical Therapy     Number of Visits Requested:   1     Expiration Date:   1/8/2025     No orders of the defined types were placed in this encounter.      History of Present Illness    Rodney Maldonado is a 73 y.o. male seen in follow-up regarding new complaint of back and radiating right leg pain which is described as an L5 distribution.  The pain is worsening and rated as a 4-5/10 without any specific inciting events or trauma.  The pain is worse at nighttime and intermittent in nature described as burning dull aching shooting and numbness.    He did present to his primary care physician, Dr. Vicente who prescribed some oral steroids as well as muscle relaxers with some mild improvement in symptoms.    He is noticing some mild functional deficits especially with family and home  responsibilities as well as recreational activities.    No recent falls.    I have personally reviewed and/or updated the patient's past medical history, past surgical history, family history, social history, current medications, allergies, and vital signs today.     Review of Systems   Constitutional:  Negative for chills, fever and unexpected weight change.   HENT:  Negative for ear pain, nosebleeds and sinus pain.    Eyes:  Negative for pain and visual disturbance.   Respiratory:  Negative for cough and wheezing.    Cardiovascular:  Negative for palpitations and leg swelling.   Gastrointestinal:  Negative for abdominal pain and constipation.   Endocrine: Negative for polydipsia and polyuria.   Genitourinary:  Negative for difficulty urinating and frequency.   Musculoskeletal:  Positive for arthralgias, back pain and myalgias. Negative for gait problem and joint swelling.   Skin:  Negative for rash.   Neurological:  Negative for weakness, numbness and headaches.   Hematological:  Does not bruise/bleed easily.   Psychiatric/Behavioral:  Negative for decreased concentration. The patient is not nervous/anxious.        Patient Active Problem List   Diagnosis    PSVT (paroxysmal supraventricular tachycardia)    ED (erectile dysfunction) of organic origin    Hypercalcemia    Hyperparathyroidism (HCC)    Lipid disorder    Nephrolithiasis    Osteopenia of multiple sites    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    Inflammatory arthritis    Preop examination    Vitamin D deficiency    Refractive amblyopia    Retinal dot hemorrhage    Presbyopia    Polyp of colon    Nonexudative senile macular degeneration of retina    Drusen (degenerative) of macula, left eye    Insomnia    Essential hypertension    Mixed hyperlipidemia    Ganglion    Depressive disorder    Combined form of senile cataract    Chronic bronchitis (HCC)    Asthma    Allergic rhinitis    Allergic conjunctivitis    Alcohol abuse     Suicidal ideation    Chronic seborrheic dermatitis    Bilateral carotid artery stenosis    Gastroesophageal reflux disease    Benign prostatic hyperplasia with weak urinary stream    History of hyperparathyroidism    Acute pain of right shoulder    Psoriasis    Bilateral impacted cerumen    Arthropathic psoriasis, unspecified (HCC)    Hyperglycemia    Chronic pain of right knee    Post-traumatic stress disorder, chronic    Mild protein-calorie malnutrition (HCC)    Chronic pain syndrome    Neck pain - Right    Cervical radiculopathy    Myofascial pain syndrome    High risk medication use    Major depressive disorder, single episode in full remission (HCC)    Cataracta    Pre-op examination    Encounter for immunization    Sciatica of right side    Right hip pain       Past Medical History:   Diagnosis Date    Anxiety     Arthritis     Depression     GERD (gastroesophageal reflux disease)     Gout     Hyperlipidemia     Hypertension     Kidney stone     Psoriatic arthritis (HCC)     PTSD (post-traumatic stress disorder)     SVT (supraventricular tachycardia)     Thyroid tumor, benign        Past Surgical History:   Procedure Laterality Date    CARDIAC ELECTROPHYSIOLOGY MAPPING AND ABLATION      SVT    PARATHYROIDECTOMY      PROSTATE SURGERY         Family History   Problem Relation Age of Onset    Coronary artery disease Brother     Hypertension Mother     No Known Problems Father        Social History     Occupational History    Not on file   Tobacco Use    Smoking status: Never    Smokeless tobacco: Never   Vaping Use    Vaping status: Never Used   Substance and Sexual Activity    Alcohol use: No    Drug use: No    Sexual activity: Not Currently       Current Outpatient Medications on File Prior to Visit   Medication Sig    albuterol (PROVENTIL HFA,VENTOLIN HFA) 90 mcg/act inhaler Inhale 2 puffs every 6 (six) hours as needed for wheezing or shortness of breath    atorvastatin (LIPITOR) 40 mg tablet Take 40 mg by  "mouth     azelastine (ASTELIN) 0.1 % nasal spray 1 spray into each nostril 2 (two) times a day Use in each nostril as directed    Carboxymethylcellulose Sod PF 0.25 % SOLN INSTILL ONE DROP BOTH EYES FOUR TIMES A DAY FOR DRY EYES    Cholecalciferol 2000 units CAPS Take 2,000 Units by mouth    dextran 70-hypromellose (GENTEAL TEARS) 0.1-0.3 % ophthalmic solution Apply 1 drop to eye    ketotifen (ZADITOR) 0.025 % ophthalmic solution INSTILL ONE DROP BOTH EYES TWICE A DAY    leflunomide (ARAVA) 20 MG tablet Take 1 tablet (20 mg total) by mouth daily    methocarbamol (ROBAXIN) 500 mg tablet Take 1 tablet (500 mg total) by mouth daily at bedtime    mirtazapine (REMERON) 30 mg tablet     polyvinyl alcohol (LIQUIFILM TEARS) 1.4 % ophthalmic solution Apply 1 drop to eye    sertraline (ZOLOFT) 100 mg tablet Take 100 mg by mouth daily     sulfaSALAzine (AZULFIDINE) 500 mg tablet Take 2 tabs twice a day    ofloxacin (OCUFLOX) 0.3 % ophthalmic solution INSTILL 1 DROP INTO RIGHT EYE FOUR TIMES A DAY FOR EYE INFECTION USE AS DIRECTED (Patient not taking: Reported on 9/29/2023)     No current facility-administered medications on file prior to visit.       Allergies   Allergen Reactions    Cefadroxil Other (See Comments)     bleeding internally    Midazolam Other (See Comments)     ARRHYTHMIA    Prazosin     Fentanyl Palpitations       Physical Exam    /84   Pulse 79   Ht 5' 10\" (1.778 m)   Wt 69.9 kg (154 lb)   BMI 22.10 kg/m²     Constitutional: normal, well developed, well nourished, alert, in no distress and non-toxic and no overt pain behavior.  Eyes: anicteric  HEENT: grossly intact  Neck:  symmetric, trachea midline and no masses   Pulmonary:even and unlabored  Cardiovascular:No edema or pitting edema present  Psychiatric:Mood and affect appropriate  Neurologic:Cranial Nerves II-XII grossly intact, bilateral lower extremity strength is normal, bilateral lower extremity muscle stretch reflexes are physiologic and " symmetric at the knees and ankles, positive straight leg raise from a seated position on the right  Musculoskeletal:normal    Imaging

## 2024-01-09 ENCOUNTER — TELEPHONE (OUTPATIENT)
Dept: PAIN MEDICINE | Facility: MEDICAL CENTER | Age: 74
End: 2024-01-09

## 2024-01-09 NOTE — TELEPHONE ENCOUNTER
----- Message from Saravanan Escobedo DO sent at 1/9/2024  8:58 AM EST -----  L5-S1 disc space narrowing with advanced lower lumbar facet arthropathy    Continue with current plan

## 2024-01-10 ENCOUNTER — EVALUATION (OUTPATIENT)
Dept: PHYSICAL THERAPY | Facility: REHABILITATION | Age: 74
End: 2024-01-10
Payer: MEDICARE

## 2024-01-10 DIAGNOSIS — M54.16 LUMBAR RADICULITIS: ICD-10-CM

## 2024-01-10 DIAGNOSIS — M54.41 CHRONIC RIGHT-SIDED LOW BACK PAIN WITH RIGHT-SIDED SCIATICA: Primary | ICD-10-CM

## 2024-01-10 DIAGNOSIS — G89.29 CHRONIC RIGHT-SIDED LOW BACK PAIN WITH RIGHT-SIDED SCIATICA: Primary | ICD-10-CM

## 2024-01-10 PROCEDURE — 97161 PT EVAL LOW COMPLEX 20 MIN: CPT

## 2024-01-10 PROCEDURE — 97110 THERAPEUTIC EXERCISES: CPT

## 2024-01-10 NOTE — PROGRESS NOTES
PT Evaluation     Today's date: 1/10/2024  Patient name: Rodney Maldonado  : 1950  MRN: [id number]  Referring provider: Saravanan Escobedo DO  Dx:   Encounter Diagnosis     ICD-10-CM    1. Chronic right-sided low back pain with right-sided sciatica  M54.41     G89.29       2. Lumbar radiculitis - Right  M54.16 Ambulatory referral to Physical Therapy                     Assessment  Assessment details: Pt, Rodney Maldonado , is a 73 y.o.  presenting to physical therapy on this date for an initial evaluation with reports of chronic low back pain with right sided radiculopathy. Upon eval on this date, pt presented with decreased lumbar ROM,  minimally decreased RLE strength, and overall impaired tolerance to functional activities. Pt was provided with initial HEP targeting nerve gliding and mobility exercises. At this time, pt would benefit from skilled OP PT to work on deficits listed above and improve overall functional mobility with decreased reports of pain and compensation patterns. POC was discussed with pt, pt verbalized understanding and is in agreement. Thank you for this referral.  Impairments: abnormal muscle firing, abnormal muscle tone, abnormal or restricted ROM, abnormal movement, activity intolerance, impaired physical strength, lacks appropriate home exercise program, pain with function, poor posture  and poor body mechanics    Goals  STG:   Pt will report 25% decrease in radicular pain pain in 2 weeks to demonstrate improved tolerance to functional activities   Pt will demonstrate 25% improvements in ROM in 4 weeks   Pt will be I with initial HEP to progress towards independence with exercise     LTG:   Pt will report 75% or greater reduction in radicular pain in his RLE by week 6   Pt will improve lumbar ROM to WFL by d/c for increased ease and safety with ADLs and overall functional mobility   Pt will improve BLE strength to 5/5 by d/c for increased ease and safety with functional mobility and  activities   Pt will be I with modified/updated HEP and demonstrate ability to complete with confidence and proper mechanics/form to safely be d/c from skilled OP PT and continue with exercises on their own        Plan  Patient would benefit from: PT eval and skilled physical therapy  Planned modality interventions: cryotherapy, thermotherapy: hydrocollator packs and traction  Planned therapy interventions: body mechanics training, functional ROM exercises, home exercise program, therapeutic exercise, therapeutic activities, stretching, strengthening, postural training, patient education, neuromuscular re-education, motor coordination training, manual therapy, joint mobilization, nerve gliding and abdominal trunk stabilization  Frequency: 1-2x/week.  Duration in visits: 10  Duration in weeks: 5  Treatment plan discussed with: patient        Subjective Evaluation    History of Present Illness  Mechanism of injury: Pt reports that his back and leg have been bothering him since November without any known NIKKI. Pt reports that he has trouble sleeping and has to take a muscle relaxer and sleeping pill. Pt denies any having a pain like this before and denies any other injuries to his R side. Pt reports that laying down on his back seems to bother him the most. Pt reports that he has to lay on his sides which he is not very comfortable with either. He reports that he is able to do all ADLs but his back bothers him with whatever he does.   Patient Goals  Patient goals for therapy: decreased pain, increased motion, increased strength and independence with ADLs/IADLs  Patient goal: get rid of the pain,  Pain  Current pain ratin  At worst pain ratin  Quality: radiating, dull ache and discomfort  Relieving factors: medications  Aggravating factors: standing (lying down)    Social Support    Employment status: not working  Treatments  Current treatment: medication        Objective     Concurrent Complaints  Negative for  bladder dysfunction, bowel dysfunction and saddle (S4) numbness    Tenderness     Left Hip   No tenderness in the PSIS.     Right Hip   No tenderness in the PSIS.     Neurological Testing     Sensation     Lumbar   Left   Intact: light touch    Right   Intact: light touch    Active Range of Motion     Lumbar   Flexion:  Restriction level: minimal  Extension:  with pain Restriction level: maximal  Left lateral flexion:  Restriction level: minimal  Right lateral flexion:  with pain Restriction level: moderate  Left rotation:  Restriction level: moderate  Right rotation:  with pain Restriction level: moderate    Strength/Myotome Testing     Left Hip   Planes of Motion   Flexion: 4+  Extension: 4  Abduction: 4+    Right Hip   Planes of Motion   Flexion: 4+  Extension: 4  Abduction: 4+    Left Knee   Flexion: 5  Extension: 5    Right Knee   Flexion: 4+  Extension: 4+    Left Ankle/Foot   Dorsiflexion: 5    Right Ankle/Foot   Dorsiflexion: 5    Tests     Lumbar     Left   Negative crossed SLR, passive SLR and slump test.     Right   Positive slump test.   Negative crossed SLR and passive SLR.     Left Pelvic Girdle/Sacrum   Negative: active SLR test.     Right Pelvic Girdle/Sacrum   Negative: active SLR test.     Left Hip   Negative BRUNO and FADIR.     Right Hip   Negative BRUNO and FADIR.     General Comments:    Lower quarter screen   Hips: unremarkable             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    Inflammatory arthritis    Preop examination    Vitamin D deficiency    Refractive amblyopia    Retinal dot hemorrhage    Presbyopia    Polyp of colon    Nonexudative senile macular degeneration of retina    Drusen  (degenerative) of macula, left eye    Insomnia    Essential hypertension    Mixed hyperlipidemia    Ganglion    Depressive disorder    Combined form of senile cataract    Chronic bronchitis (HCC)    Asthma    Allergic rhinitis    Allergic conjunctivitis    Alcohol abuse    Suicidal ideation    Chronic seborrheic dermatitis    Bilateral carotid artery stenosis    Gastroesophageal reflux disease    Benign prostatic hyperplasia with weak urinary stream    History of hyperparathyroidism    Acute pain of right shoulder    Psoriasis    Bilateral impacted cerumen    Arthropathic psoriasis, unspecified (HCC)    Hyperglycemia    Chronic pain of right knee    Post-traumatic stress disorder, chronic    Mild protein-calorie malnutrition (HCC)    Chronic pain syndrome    Neck pain - Right    Cervical radiculopathy    Myofascial pain syndrome    High risk medication use    Major depressive disorder, single episode in full remission (HCC)    Cataracta    Pre-op examination    Encounter for immunization    Sciatica of right side    Right hip pain         1/10            FOTO IE            IE/RE IE               Manuals             SL/Prone lumbar PS STM                                                     Neuro Re-Ed             90/90 Nerve glide  demo            Seated slump slider              Supine ball crush                                                                 Ther Ex             Bike              Seated HS stretch  demo            LTR  demo            Supine piriformis stretch demo            SKTC                                                                  Modalities                          Traction if needed                Access Code: [email or web address]  URL: [email or web address]  Date: 01/10/2024  Prepared by: Tracie Gooden    Exercises  - Seated Hamstring Stretch  - 2 x daily - 7 x weekly - 4 reps - 15 hold  - Lower Trunk Rotations  - 2 x daily - 7 x weekly - 5 reps - 10 hold  - Supine Piriformis  Stretch with Foot on Ground  - 2 x daily - 7 x weekly - 4 reps - 15 hold  - Supine Sciatic Nerve Glide  - 2 x daily - 7 x weekly - 10 reps - 5 hold

## 2024-01-11 NOTE — TELEPHONE ENCOUNTER
RN attempted to reach pt regarding previous, pt;s mailbox was full and RN was unable to leave a VMMOM.  Will send message via [facility] that RN is trying to reach pt regarding same.

## 2024-01-12 ENCOUNTER — OFFICE VISIT (OUTPATIENT)
Dept: PHYSICAL THERAPY | Facility: REHABILITATION | Age: 74
End: 2024-01-12
Payer: MEDICARE

## 2024-01-12 DIAGNOSIS — G89.29 CHRONIC RIGHT-SIDED LOW BACK PAIN WITH RIGHT-SIDED SCIATICA: ICD-10-CM

## 2024-01-12 DIAGNOSIS — M54.16 LUMBAR RADICULITIS: Primary | ICD-10-CM

## 2024-01-12 DIAGNOSIS — M54.41 CHRONIC RIGHT-SIDED LOW BACK PAIN WITH RIGHT-SIDED SCIATICA: ICD-10-CM

## 2024-01-12 PROCEDURE — 97140 MANUAL THERAPY 1/> REGIONS: CPT

## 2024-01-12 PROCEDURE — 97110 THERAPEUTIC EXERCISES: CPT

## 2024-01-12 NOTE — PROGRESS NOTES
Daily Note     Today's date: 2024  Patient name: Rodney Maldonado  : 1950  MRN: [id number]  Referring provider: Saravanan Escobedo DO  Dx:   Encounter Diagnosis     ICD-10-CM    1. Lumbar radiculitis - Right  M54.16       2. Chronic right-sided low back pain with right-sided sciatica  M54.41     G89.29                      Subjective: Pt reports that he feels the same. He reported no increased pain/discomfort after his IE. He reports that he has been doing his exercises 2x/day.       Objective: See treatment diary below      Assessment: Pt tolerated treatment well. Pt was educated that due to completing new exercises on this date, he may feel some soreness later like he did a workout but it should not be an increase in pain. Pt was educated to inform therapist at next appointment how he felt so that plan of care can be adjusted as needed, pt verbalized understanding. Patient demonstrated fatigue post treatment, exhibited good technique with therapeutic exercises, and would benefit from continued PT.      Plan: Continue per plan of care.  Progress treatment as tolerated.       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    Inflammatory arthritis    Preop examination    Vitamin D deficiency    Refractive amblyopia    Retinal dot hemorrhage    Presbyopia    Polyp of colon    Nonexudative senile macular degeneration of retina    Drusen (degenerative) of macula, left eye    Insomnia    Essential hypertension    Mixed hyperlipidemia    Ganglion    Depressive disorder    Combined form of senile cataract    Chronic bronchitis (HCC)    Asthma    Allergic rhinitis    Allergic conjunctivitis    Alcohol abuse    Suicidal ideation     "Chronic seborrheic dermatitis    Bilateral carotid artery stenosis    Gastroesophageal reflux disease    Benign prostatic hyperplasia with weak urinary stream    History of hyperparathyroidism    Acute pain of right shoulder    Psoriasis    Bilateral impacted cerumen    Arthropathic psoriasis, unspecified (HCC)    Hyperglycemia    Chronic pain of right knee    Post-traumatic stress disorder, chronic    Mild protein-calorie malnutrition (HCC)    Chronic pain syndrome    Neck pain - Right    Cervical radiculopathy    Myofascial pain syndrome    High risk medication use    Major depressive disorder, single episode in full remission (HCC)    Cataracta    Pre-op examination    Encounter for immunization    Sciatica of right side    Right hip pain         1/10 1/12           FOTO IE            IE/RE IE               Manuals             SL/Prone lumbar PS STM   Prone ML                                                   Neuro Re-Ed             90/90 Nerve glide  demo            Seated slump slider   5\" x10            Supine ball crush                                                                 Ther Ex             Bike   X5 min            Seated HS stretch  demo            LTR  demo Sink stretch 10\"X5            Supine piriformis stretch demo            SKTC   20\"x3 ea              SL open book stretch 5\" x10 ea              Lumbar side stretch with foot up on blue mat 10\"x5                                      Modalities                          Traction if needed                Access Code: [email or web address]  URL: [email or web address]  Date: 01/10/2024  Prepared by: Tracie Gooden    Exercises  - Seated Hamstring Stretch  - 2 x daily - 7 x weekly - 4 reps - 15 hold  - Lower Trunk Rotations  - 2 x daily - 7 x weekly - 5 reps - 10 hold  - Supine Piriformis Stretch with Foot on Ground  - 2 x daily - 7 x weekly - 4 reps - 15 hold  - Supine Sciatic Nerve Glide  - 2 x daily - 7 x weekly - 10 reps - 5 hold           "

## 2024-01-15 ENCOUNTER — OFFICE VISIT (OUTPATIENT)
Dept: PHYSICAL THERAPY | Facility: REHABILITATION | Age: 74
End: 2024-01-15
Payer: MEDICARE

## 2024-01-15 DIAGNOSIS — M54.16 LUMBAR RADICULITIS: Primary | ICD-10-CM

## 2024-01-15 DIAGNOSIS — G89.29 CHRONIC RIGHT-SIDED LOW BACK PAIN WITH RIGHT-SIDED SCIATICA: ICD-10-CM

## 2024-01-15 DIAGNOSIS — M54.41 CHRONIC RIGHT-SIDED LOW BACK PAIN WITH RIGHT-SIDED SCIATICA: ICD-10-CM

## 2024-01-15 PROCEDURE — 97110 THERAPEUTIC EXERCISES: CPT

## 2024-01-15 PROCEDURE — 97112 NEUROMUSCULAR REEDUCATION: CPT

## 2024-01-15 NOTE — PROGRESS NOTES
Daily Note     Today's date: 1/15/2024  Patient name: Rodney Maldonado  : 1950  MRN: [id number]  Referring provider: Saravanan Escobedo DO  Dx:   Encounter Diagnosis     ICD-10-CM    1. Lumbar radiculitis - Right  M54.16       2. Chronic right-sided low back pain with right-sided sciatica  M54.41     G89.29                      Subjective: Pt reports that he is feeling okay, had no complaints after last session.       Objective: See treatment diary below      Assessment: Pt tolerated treatment well. Pt did well with progression of exercises on today's date and was educated that due to completed new resisted exercises he may feel soreness later but it should just feel like he did a workout, and there should not be increased pain. Pt was educated to inform therapist at next session how he felt so that POC can be adjusted as needed, pt verbalized understanding. Patient demonstrated fatigue post treatment, exhibited good technique with therapeutic exercises, and would benefit from continued PT.       Plan: Continue per plan of care.  Progress treatment as tolerated.       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    Inflammatory arthritis    Preop examination    Vitamin D deficiency    Refractive amblyopia    Retinal dot hemorrhage    Presbyopia    Polyp of colon    Nonexudative senile macular degeneration of retina    Drusen (degenerative) of macula, left eye    Insomnia    Essential hypertension    Mixed hyperlipidemia    Ganglion    Depressive disorder    Combined form of senile cataract    Chronic bronchitis (HCC)    Asthma    Allergic rhinitis    Allergic conjunctivitis    Alcohol abuse    Suicidal ideation    Chronic  "seborrheic dermatitis    Bilateral carotid artery stenosis    Gastroesophageal reflux disease    Benign prostatic hyperplasia with weak urinary stream    History of hyperparathyroidism    Acute pain of right shoulder    Psoriasis    Bilateral impacted cerumen    Arthropathic psoriasis, unspecified (HCC)    Hyperglycemia    Chronic pain of right knee    Post-traumatic stress disorder, chronic    Mild protein-calorie malnutrition (HCC)    Chronic pain syndrome    Neck pain - Right    Cervical radiculopathy    Myofascial pain syndrome    High risk medication use    Major depressive disorder, single episode in full remission (HCC)    Cataracta    Pre-op examination    Encounter for immunization    Sciatica of right side    Right hip pain         1/10 1/12 1/15          FOTO IE            IE/RE IE               Manuals             SL/Prone lumbar PS STM   Prone ML  Prone ML                                                  Neuro Re-Ed             90/90 Nerve glide  demo            Seated slump slider   5\" x10  5\" x10           Supine ball crush                [place] paloff press 8.0# 5\" x10 ea                                                  Ther Ex             Bike   X5 min  X5 min           Seated HS stretch  demo  [place] walk out 10.0# x8           LTR  demo Sink stretch 10\"X5  Sink stretch 15\"X5           Supine piriformis stretch demo  [place] Ext 10.0# x10           SKTC   20\"x3 ea              SL open book stretch 5\" x10 ea              Lumbar side stretch with foot up on blue mat 10\"x5  Lumbar side stretch with foot up on blue mat 15\"x4                                    Modalities                          Traction if needed                Access Code: [email or web address]  URL: [email or web address]  Date: 01/10/2024  Prepared by: Tracie Gooden    Exercises  - Seated Hamstring Stretch  - 2 x daily - 7 x weekly - 4 reps - 15 hold  - Lower Trunk Rotations  - 2 x daily - 7 x weekly - 5 reps - 10 hold  - Supine " Piriformis Stretch with Foot on Ground  - 2 x daily - 7 x weekly - 4 reps - 15 hold  - Supine Sciatic Nerve Glide  - 2 x daily - 7 x weekly - 10 reps - 5 hold

## 2024-01-18 ENCOUNTER — OFFICE VISIT (OUTPATIENT)
Dept: PHYSICAL THERAPY | Facility: REHABILITATION | Age: 74
End: 2024-01-18
Payer: MEDICARE

## 2024-01-18 DIAGNOSIS — M54.41 CHRONIC RIGHT-SIDED LOW BACK PAIN WITH RIGHT-SIDED SCIATICA: ICD-10-CM

## 2024-01-18 DIAGNOSIS — M54.16 LUMBAR RADICULITIS: Primary | ICD-10-CM

## 2024-01-18 DIAGNOSIS — G89.29 CHRONIC RIGHT-SIDED LOW BACK PAIN WITH RIGHT-SIDED SCIATICA: ICD-10-CM

## 2024-01-18 PROCEDURE — 97112 NEUROMUSCULAR REEDUCATION: CPT

## 2024-01-18 PROCEDURE — 97110 THERAPEUTIC EXERCISES: CPT

## 2024-01-18 NOTE — PROGRESS NOTES
Daily Note     Today's date: 2024  Patient name: Rodney Maldonado  : 1950  MRN: [id number]  Referring provider: Saravanan Escobedo DO  Dx:   Encounter Diagnosis     ICD-10-CM    1. Lumbar radiculitis - Right  M54.16       2. Chronic right-sided low back pain with right-sided sciatica  M54.41     G89.29                      Subjective: Pt reports that he had a little muscle soreness after last visit but it wasn't a bad pain. He reports that today he doesn't notice much pain in to his leg and overall feels okay.       Objective: See treatment diary below      Assessment: Pt tolerated treatment well. Patient demonstrated fatigue post treatment, exhibited good technique with therapeutic exercises, and would benefit from continued PT.       Plan: Continue per plan of care.  Progress treatment as tolerated.       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    Inflammatory arthritis    Preop examination    Vitamin D deficiency    Refractive amblyopia    Retinal dot hemorrhage    Presbyopia    Polyp of colon    Nonexudative senile macular degeneration of retina    Drusen (degenerative) of macula, left eye    Insomnia    Essential hypertension    Mixed hyperlipidemia    Ganglion    Depressive disorder    Combined form of senile cataract    Chronic bronchitis (HCC)    Asthma    Allergic rhinitis    Allergic conjunctivitis    Alcohol abuse    Suicidal ideation    Chronic seborrheic dermatitis    Bilateral carotid artery stenosis    Gastroesophageal reflux disease    Benign prostatic hyperplasia with weak urinary stream    History of hyperparathyroidism    Acute pain of right shoulder    Psoriasis    Bilateral impacted cerumen     "Arthropathic psoriasis, unspecified (HCC)    Hyperglycemia    Chronic pain of right knee    Post-traumatic stress disorder, chronic    Mild protein-calorie malnutrition (HCC)    Chronic pain syndrome    Neck pain - Right    Cervical radiculopathy    Myofascial pain syndrome    High risk medication use    Major depressive disorder, single episode in full remission (HCC)    Cataracta    Pre-op examination    Encounter for immunization    Sciatica of right side    Right hip pain         1/10 1/12 1/15 1/18         FOTO IE            IE/RE IE               Manuals             SL/Prone lumbar PS STM   Prone ML  Prone ML  Prone ML                                                 Neuro Re-Ed             90/90 Nerve glide  demo   Bridge 5\" x15          Seated slump slider   5\" x10  5\" x10           Supine ball crush    Stand ball crush 5\" x15             [place] paloff press 8.0# 5\" x10 ea  [place] paloff press 8.0# 5\" x10 ea                                                 Ther Ex             Bike   X5 min  X5 min  X6 min          Seated HS stretch  demo  [place] walk out 10.0# x8  [place] walk out 10.0# x10          LTR  demo Sink stretch 10\"X5  Sink stretch 15\"X5  Sink stretch 15\"X5          Supine piriformis stretch demo  [place] Ext 10.0# x10  [place] Ext 10.0# x15          SKTC   20\"x3 ea              SL open book stretch 5\" x10 ea              Lumbar side stretch with foot up on blue mat 10\"x5  Lumbar side stretch with foot up on blue mat 15\"x4 Lumbar side stretch with foot up on blue mat 15\"x5                                   Modalities                          Traction if needed                Access Code: [email or web address]  URL: [email or web address]  Date: 01/10/2024  Prepared by: Tracie Gooden    Exercises  - Seated Hamstring Stretch  - 2 x daily - 7 x weekly - 4 reps - 15 hold  - Lower Trunk Rotations  - 2 x daily - 7 x weekly - 5 reps - 10 hold  - Supine Piriformis Stretch with Foot on Ground  - 2 x daily - 7 x " weekly - 4 reps - 15 hold  - Supine Sciatic Nerve Glide  - 2 x daily - 7 x weekly - 10 reps - 5 hold

## 2024-01-22 ENCOUNTER — OFFICE VISIT (OUTPATIENT)
Dept: PHYSICAL THERAPY | Facility: REHABILITATION | Age: 74
End: 2024-01-22
Payer: MEDICARE

## 2024-01-22 DIAGNOSIS — M54.16 LUMBAR RADICULITIS: Primary | ICD-10-CM

## 2024-01-22 DIAGNOSIS — G89.29 CHRONIC RIGHT-SIDED LOW BACK PAIN WITH RIGHT-SIDED SCIATICA: ICD-10-CM

## 2024-01-22 DIAGNOSIS — M54.41 CHRONIC RIGHT-SIDED LOW BACK PAIN WITH RIGHT-SIDED SCIATICA: ICD-10-CM

## 2024-01-22 PROCEDURE — 97140 MANUAL THERAPY 1/> REGIONS: CPT

## 2024-01-22 PROCEDURE — 97110 THERAPEUTIC EXERCISES: CPT

## 2024-01-22 PROCEDURE — 97530 THERAPEUTIC ACTIVITIES: CPT

## 2024-01-22 NOTE — PROGRESS NOTES
Daily Note     Today's date: 2024  Patient name: Rodney Maldonado  : 1950  MRN: [id number]  Referring provider: Saravanan Escobedo DO  Dx:   Encounter Diagnosis     ICD-10-CM    1. Lumbar radiculitis - Right  M54.16       2. Chronic right-sided low back pain with right-sided sciatica  M54.41     G89.29                      Subjective: Pt reports that his back has been bothering him since Saturday and he noticed some pain going down into his foot as well. He denied any pain after last visit or pain into his leg.       Objective: See treatment diary below    Repeated flexion: no worse   Repeated extension: worsened        Assessment: Pt tolerated treatment well. Pt was able to complete all exercises to his tolerance with decreased muscle tightness and no worsening of his RLE n/t. Repeated motions were trialed on this date, pt reported worsening symptoms with repeated extension, pt tried repeated flexion and noted no worsening pain. Pt was educated to trial repeated flexion throughout the day at home until his next appointment, but to stop if it is making his symptoms worse, pt verbalized understanding. Patient demonstrated fatigue post treatment, exhibited good technique with therapeutic exercises, and would benefit from continued PT      Plan: RE next visit.      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    Inflammatory arthritis    Preop examination    Vitamin D deficiency    Refractive amblyopia    Retinal dot hemorrhage    Presbyopia    Polyp of colon    Nonexudative senile macular degeneration of retina    Drusen (degenerative) of macula, left eye    Insomnia    Essential hypertension    Mixed hyperlipidemia     "Ganglion    Depressive disorder    Combined form of senile cataract    Chronic bronchitis (HCC)    Asthma    Allergic rhinitis    Allergic conjunctivitis    Alcohol abuse    Suicidal ideation    Chronic seborrheic dermatitis    Bilateral carotid artery stenosis    Gastroesophageal reflux disease    Benign prostatic hyperplasia with weak urinary stream    History of hyperparathyroidism    Acute pain of right shoulder    Psoriasis    Bilateral impacted cerumen    Arthropathic psoriasis, unspecified (HCC)    Hyperglycemia    Chronic pain of right knee    Post-traumatic stress disorder, chronic    Mild protein-calorie malnutrition (HCC)    Chronic pain syndrome    Neck pain - Right    Cervical radiculopathy    Myofascial pain syndrome    High risk medication use    Major depressive disorder, single episode in full remission (HCC)    Cataracta    Pre-op examination    Encounter for immunization    Sciatica of right side    Right hip pain         1/10 1/12 1/15 1/18 1/22        FOTO IE    NV         IE/RE IE               Manuals             SL/Prone lumbar PS STM   Prone ML  Prone ML  Prone ML  Prone ML                                                Neuro Re-Ed             90/90 Nerve glide  demo   Bridge 5\" x15  90/90 nerve glide 5\" x10         Seated slump slider   5\" x10  5\" x10   5\" x10 ea         Supine ball crush    Stand ball crush 5\" x15             [place] paloff press 8.0# 5\" x10 ea  [place] paloff press 8.0# 5\" x10 ea                                                 Ther Ex             Bike   X5 min  X5 min  X6 min  X6 min        Seated HS stretch  demo  [place] walk out 10.0# x8  [place] walk out 10.0# x10          LTR  demo Sink stretch 10\"X5  Sink stretch 15\"X5  Sink stretch 15\"X5  Sink stretch 15\"X5         Supine piriformis stretch demo  [place] Ext 10.0# x10  [place] Ext 10.0# x15          SKTC   20\"x3 ea    Sup piri stretch 15\"x5           SL open book stretch 5\" x10 ea    LTR 10\"x5 ea           Lumbar side " "stretch with foot up on blue mat 10\"x5  Lumbar side stretch with foot up on blue mat 15\"x4 Lumbar side stretch with foot up on blue mat 15\"x5 Lumbar side stretch with foot up on blue mat 15\"x5                                  Modalities                          Traction if needed                Access Code: [email or web address]  URL: [email or web address]  Date: 01/10/2024  Prepared by: Tracie Gooden    Exercises  - Seated Hamstring Stretch  - 2 x daily - 7 x weekly - 4 reps - 15 hold  - Lower Trunk Rotations  - 2 x daily - 7 x weekly - 5 reps - 10 hold  - Supine Piriformis Stretch with Foot on Ground  - 2 x daily - 7 x weekly - 4 reps - 15 hold  - Supine Sciatic Nerve Glide  - 2 x daily - 7 x weekly - 10 reps - 5 hold                 "

## 2024-01-25 ENCOUNTER — EVALUATION (OUTPATIENT)
Dept: PHYSICAL THERAPY | Facility: REHABILITATION | Age: 74
End: 2024-01-25
Payer: MEDICARE

## 2024-01-25 DIAGNOSIS — M54.16 LUMBAR RADICULITIS: Primary | ICD-10-CM

## 2024-01-25 DIAGNOSIS — M54.41 CHRONIC RIGHT-SIDED LOW BACK PAIN WITH RIGHT-SIDED SCIATICA: ICD-10-CM

## 2024-01-25 DIAGNOSIS — G89.29 CHRONIC RIGHT-SIDED LOW BACK PAIN WITH RIGHT-SIDED SCIATICA: ICD-10-CM

## 2024-01-25 PROCEDURE — 97164 PT RE-EVAL EST PLAN CARE: CPT

## 2024-01-25 PROCEDURE — 97110 THERAPEUTIC EXERCISES: CPT

## 2024-01-25 NOTE — PROGRESS NOTES
PT Re-evaluation    Today's date: 2024  Patient name: Rodney Maldonado  : 1950  MRN: [id number]  Referring provider: Saravanan Escobedo DO  Dx:   Encounter Diagnosis     ICD-10-CM    1. Lumbar radiculitis - Right  M54.16       2. Chronic right-sided low back pain with right-sided sciatica  M54.41     G89.29                    Assessment  Assessment details:   Upon RE on this date pt presents with continued limited lumbar ROM, near WFL LE strength, and continued RLE radicular symptoms which are worsened with R lumbar rotation and side bending. Pt has tolerated exercises well thus far and has been compliant with his HEP. At this time, pt would benefit from continued OP PT to improve ROM, strength, and tolerance to functional activities while decreasing RLE radicular symptoms for increased ease and function with mobility and ADLs. POC was discussed with patient, pt verbalized understanding and is in agreement.      Impairments: abnormal muscle firing, abnormal muscle tone, abnormal or restricted ROM, abnormal movement, activity intolerance, impaired physical strength, lacks appropriate home exercise program, pain with function, poor posture  and poor body mechanics     Goals  STG:   Pt will report 25% decrease in radicular pain pain in 2 weeks to demonstrate improved tolerance to functional activities NOT MET   Pt will demonstrate 25% improvements in ROM in 4 weeks NOT MET   Pt will be I with initial HEP to progress towards independence with exercise MET      LTG: CONTINUE ALL  Pt will report 75% or greater reduction in radicular pain in his RLE by week 6   Pt will improve lumbar ROM to WFL by d/c for increased ease and safety with ADLs and overall functional mobility   Pt will improve BLE strength to 5/5 by d/c for increased ease and safety with functional mobility and activities   Pt will be I with modified/updated HEP and demonstrate ability to complete with confidence and proper mechanics/form to safely be  d/c from skilled OP PT and continue with exercises on their own         Plan  Patient would benefit from: PT eval and skilled physical therapy  Planned modality interventions: cryotherapy, thermotherapy: hydrocollator packs and traction  Planned therapy interventions: body mechanics training, functional ROM exercises, home exercise program, therapeutic exercise, therapeutic activities, stretching, strengthening, postural training, patient education, neuromuscular re-education, motor coordination training, manual therapy, joint mobilization, nerve gliding and abdominal trunk stabilization  Frequency: 1-2x/week.  Duration in visits: 10  Duration in weeks: 5  Treatment plan discussed with: patient       Subjective Evaluation     History of Present Illness  Mechanism of injury: Pt reports that his back and leg have been bothering him since November without any known NIKKI. Pt reports that he has trouble sleeping and has to take a muscle relaxer and sleeping pill. Pt denies any having a pain like this before and denies any other injuries to his R side. Pt reports that laying down on his back seems to bother him the most. Pt reports that he has to lay on his sides which he is not very comfortable with either. He reports that he is able to do all ADLs but his back bothers him with whatever he does.     1/25/24  Pt reports that everything feels about the same. Pt reports that he is still getting pain down his leg in the same areas. Pt reports that his leg pain will depend on what position he is in. Pt reports that lying down on his back relieves some of his pain. Pt reports that his pain is no longer bother him at night as much. Pt denies that his pain wakes him up at night and he is able to lay on his back now to sleep. Pt reports that he is able to do all ADLs without issue.  Pt reports that depending on position he will get a momentary burning pain but it doesn't last. He reports that he is no longer taking muscle  relaxer's either.     Patient Goals  Patient goals for therapy: decreased pain, increased motion, increased strength and independence with ADLs/IADLs  Patient goal: get rid of the pain,  Pain  Current pain ratin  At worst pain ratin  Quality: radiating, dull ache and discomfort  Relieving factors: medications  Aggravating factors: standing (lying down)    24  Current pain ratin  At worst pain ratin-8  Quality: burning   Relieving factors: lying on his back   Aggravating factors: turning a certain way      Social Support     Employment status: not working  Treatments  Current treatment: medication           Objective      Concurrent Complaints  Negative for bladder dysfunction, bowel dysfunction and saddle (S4) numbness     Tenderness      Left Hip   No tenderness in the PSIS.      Right Hip   No tenderness in the PSIS.      Neurological Testing      Sensation      Lumbar   Left   Intact: light touch     Right   Intact: light touch     Active Range of Motion      Lumbar   Flexion:  Restriction level: minimal  Extension:  with pain Restriction level: maximal  Left lateral flexion:  Restriction level: minimal  Right lateral flexion:  with pain Restriction level: moderate  Left rotation:  Restriction level: moderate  Right rotation:  with pain Restriction level: moderate    24  Flexion:  Restriction level: min, finger tip to toes   Extension:  Restriction level: max, knee bend compensation, pt reported pain in the leg   Left lateral flexion:  Restriction level: min  Right lateral flexion: Restriction level: mod, pt reported pain   Left rotation:  Restriction level: min  Right rotation: Restriction level: min    Pt most challenged with right rotation and right side bending      Strength/Myotome Testing      Left Hip   Planes of Motion   Flexion: 4+  Extension: 4  Abduction: 4+     Right Hip   Planes of Motion   Flexion: 4+  Extension: 4  Abduction: 4+     Left Knee   Flexion: 5  Extension: 5      Right Knee   Flexion: 4+  Extension: 4+     Left Ankle/Foot   Dorsiflexion: 5     Right Ankle/Foot   Dorsiflexion: 5    1/25/24  Left Hip   Planes of Motion   Flexion: 4+  Extension: 4  Abduction: 4+     Right Hip   Planes of Motion   Flexion: 4+  Extension: 4  Abduction: 4+     Left Knee   Flexion: 5  Extension: 5     Right Knee   Flexion: 4+  Extension: 4+     Left Ankle/Foot   Dorsiflexion: 5     Right Ankle/Foot   Dorsiflexion: 5     Tests      Lumbar      Left   Negative crossed SLR, passive SLR and slump test.      Right   Positive slump test.   Negative crossed SLR and passive SLR.      Left Pelvic Girdle/Sacrum   Negative: active SLR test.      Right Pelvic Girdle/Sacrum   Negative: active SLR test.      Left Hip   Negative BRUNO and FADIR.      Right Hip   Negative BRUNO and FADIR.     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    Inflammatory arthritis    Preop examination    Vitamin D deficiency    Refractive amblyopia    Retinal dot hemorrhage    Presbyopia    Polyp of colon    Nonexudative senile macular degeneration of retina    Drusen (degenerative) of macula, left eye    Insomnia    Essential hypertension    Mixed hyperlipidemia    Ganglion    Depressive disorder    Combined form of senile cataract    Chronic bronchitis (HCC)    Asthma    Allergic rhinitis    Allergic conjunctivitis    Alcohol abuse    Suicidal ideation    Chronic seborrheic dermatitis    Bilateral carotid artery stenosis    Gastroesophageal reflux disease    Benign prostatic hyperplasia with  "weak urinary stream    History of hyperparathyroidism    Acute pain of right shoulder    Psoriasis    Bilateral impacted cerumen    Arthropathic psoriasis, unspecified (HCC)    Hyperglycemia    Chronic pain of right knee    Post-traumatic stress disorder, chronic    Mild protein-calorie malnutrition (HCC)    Chronic pain syndrome    Neck pain - Right    Cervical radiculopathy    Myofascial pain syndrome    High risk medication use    Major depressive disorder, single episode in full remission (HCC)    Cataracta    Pre-op examination    Encounter for immunization    Sciatica of right side    Right hip pain         1/10 1/12 1/15 1/18 1/22 1/25       FOTO IE    NV  RE       IE/RE IE     RE          Manuals             SL/Prone lumbar PS STM   Prone ML  Prone ML  Prone ML  Prone ML  NV              L SL rotation mob NV                                  Neuro Re-Ed             90/90 Nerve glide  demo   Bridge 5\" x15  90/90 nerve glide 5\" x10         Seated slump slider   5\" x10  5\" x10   5\" x10 ea         Supine ball crush    Stand ball crush 5\" x15             [place] paloff press 8.0# 5\" x10 ea  [place] paloff press 8.0# 5\" x10 ea                                                 Ther Ex             Bike   X5 min  X5 min  X6 min  X6 min X6 min        Seated HS stretch  demo  [place] walk out 10.0# x8  [place] walk out 10.0# x10          LTR  demo Sink stretch 10\"X5  Sink stretch 15\"X5  Sink stretch 15\"X5  Sink stretch 15\"X5         Supine piriformis stretch demo  [place] Ext 10.0# x10  [place] Ext 10.0# x15          SKTC   20\"x3 ea    Sup piri stretch 15\"x5           SL open book stretch 5\" x10 ea    LTR 10\"x5 ea           Lumbar side stretch with foot up on blue mat 10\"x5  Lumbar side stretch with foot up on blue mat 15\"x4 Lumbar side stretch with foot up on blue mat 15\"x5 Lumbar side stretch with foot up on blue mat 15\"x5                                  Modalities                          Traction if needed            "     Access Code: [email or web address]  URL: [email or web address]  Date: 01/10/2024  Prepared by: Tracie Gooden    Exercises  - Seated Hamstring Stretch  - 2 x daily - 7 x weekly - 4 reps - 15 hold  - Lower Trunk Rotations  - 2 x daily - 7 x weekly - 5 reps - 10 hold  - Supine Piriformis Stretch with Foot on Ground  - 2 x daily - 7 x weekly - 4 reps - 15 hold  - Supine Sciatic Nerve Glide  - 2 x daily - 7 x weekly - 10 reps - 5 hold

## 2024-01-30 ENCOUNTER — OFFICE VISIT (OUTPATIENT)
Dept: PHYSICAL THERAPY | Facility: REHABILITATION | Age: 74
End: 2024-01-30
Payer: MEDICARE

## 2024-01-30 DIAGNOSIS — G89.29 CHRONIC RIGHT-SIDED LOW BACK PAIN WITH RIGHT-SIDED SCIATICA: ICD-10-CM

## 2024-01-30 DIAGNOSIS — M54.41 CHRONIC RIGHT-SIDED LOW BACK PAIN WITH RIGHT-SIDED SCIATICA: ICD-10-CM

## 2024-01-30 DIAGNOSIS — M54.16 LUMBAR RADICULITIS: Primary | ICD-10-CM

## 2024-01-30 PROCEDURE — 97112 NEUROMUSCULAR REEDUCATION: CPT

## 2024-01-30 PROCEDURE — 97140 MANUAL THERAPY 1/> REGIONS: CPT

## 2024-01-30 PROCEDURE — 97110 THERAPEUTIC EXERCISES: CPT

## 2024-01-30 NOTE — PROGRESS NOTES
Daily Note     Today's date: 2024  Patient name: Rodney Maldonado  : 1950  MRN: [id number]  Referring provider: Saravanan Escobedo DO  Dx:   Encounter Diagnosis     ICD-10-CM    1. Lumbar radiculitis - Right  M54.16       2. Chronic right-sided low back pain with right-sided sciatica  M54.41     G89.29                      Subjective: Pt reports that overall he feels the same, with his back and his RLE still bothering him with numbness in his foot.       Objective: See treatment diary below    R lateral shift to wall x10: no change in symptoms   L lateral shift to wall x10: no change in symptoms   Forward trunk bend with R hand to L foot: no change in symptoms   Forward trunk bend with L hand to R foot: no change in symptoms       Assessment: Pt tolerated treatment well. Pt had an increase in RLE radicular symptoms after jenni extension exercises, pt completed repeated fwd flexion and noted a minor decrease in his symptoms. Slight lateral lean with trunk during suitcase carry away from side of weight being carried on. Lateral shifting and forward trunk bends with rotation were examined on today's date, no position recreated or increased his pain. Pt was educated to continue with forward trunk flexion throughout the day. Patient demonstrated fatigue post treatment, exhibited good technique with therapeutic exercises, and would benefit from continued PT.       Plan: Continue per plan of care.  Progress treatment as tolerated.       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    Inflammatory arthritis    Preop examination    Vitamin D deficiency    Refractive amblyopia    Retinal dot hemorrhage    Presbyopia    Polyp of  "colon    Nonexudative senile macular degeneration of retina    Drusen (degenerative) of macula, left eye    Insomnia    Essential hypertension    Mixed hyperlipidemia    Ganglion    Depressive disorder    Combined form of senile cataract    Chronic bronchitis (HCC)    Asthma    Allergic rhinitis    Allergic conjunctivitis    Alcohol abuse    Suicidal ideation    Chronic seborrheic dermatitis    Bilateral carotid artery stenosis    Gastroesophageal reflux disease    Benign prostatic hyperplasia with weak urinary stream    History of hyperparathyroidism    Acute pain of right shoulder    Psoriasis    Bilateral impacted cerumen    Arthropathic psoriasis, unspecified (HCC)    Hyperglycemia    Chronic pain of right knee    Post-traumatic stress disorder, chronic    Mild protein-calorie malnutrition (HCC)    Chronic pain syndrome    Neck pain - Right    Cervical radiculopathy    Myofascial pain syndrome    High risk medication use    Major depressive disorder, single episode in full remission (HCC)    Cataracta    Pre-op examination    Encounter for immunization    Sciatica of right side    Right hip pain         1/10 1/12 1/15 1/18 1/22 1/25 1/30      FOTO IE    NV  RE       IE/RE IE     RE          Manuals             SL/Prone lumbar PS STM   Prone ML  Prone ML  Prone ML  Prone ML  NV  ML             L SL rotation mob NV                                  Neuro Re-Ed             90/90 Nerve glide  demo   Bridge 5\" x15  90/90 nerve glide 5\" x10         Seated slump slider   5\" x10  5\" x10   5\" x10 ea         Supine ball crush    Stand ball crush 5\" x15             [place] paloff press 8.0# 5\" x10 ea  [place] paloff press 8.0# 5\" x10 ea    [place] paloff press 8.0# 5\" x10 ea              Repeated flex x10                                 Ther Ex             Bike   X5 min  X5 min  X6 min  X6 min X6 min  X6 min       Seated HS stretch  demo  [place] walk out 10.0# x8  [place] walk out 10.0# x10          LTR  demo Sink stretch 10\"X5 " " Sink stretch 15\"X5  Sink stretch 15\"X5  Sink stretch 15\"X5   Sink stretch 15\" x5       Supine piriformis stretch demo  [place] Ext 10.0# x10  [place] Ext 10.0# x15    [place] Ext 10.0# 2x10       SKTC   20\"x3 ea    Sup piri stretch 15\"x5   Sup piri stretch 15\"x5         SL open book stretch 5\" x10 ea    LTR 10\"x5 ea   LTR 10\"x5 ea         Lumbar side stretch with foot up on blue mat 10\"x5  Lumbar side stretch with foot up on blue mat 15\"x4 Lumbar side stretch with foot up on blue mat 15\"x5 Lumbar side stretch with foot up on blue mat 15\"x5  Suitcase carry 5# KB x2 laps ea                                 Modalities                          Traction if needed                Access Code: [email or web address]  URL: [email or web address]  Date: 01/10/2024  Prepared by: Tracie Gooden    Exercises  - Seated Hamstring Stretch  - 2 x daily - 7 x weekly - 4 reps - 15 hold  - Lower Trunk Rotations  - 2 x daily - 7 x weekly - 5 reps - 10 hold  - Supine Piriformis Stretch with Foot on Ground  - 2 x daily - 7 x weekly - 4 reps - 15 hold  - Supine Sciatic Nerve Glide  - 2 x daily - 7 x weekly - 10 reps - 5 hold                     "

## 2024-02-01 ENCOUNTER — OFFICE VISIT (OUTPATIENT)
Dept: PHYSICAL THERAPY | Facility: REHABILITATION | Age: 74
End: 2024-02-01
Payer: MEDICARE

## 2024-02-01 DIAGNOSIS — M54.41 CHRONIC RIGHT-SIDED LOW BACK PAIN WITH RIGHT-SIDED SCIATICA: ICD-10-CM

## 2024-02-01 DIAGNOSIS — M54.16 LUMBAR RADICULITIS: Primary | ICD-10-CM

## 2024-02-01 DIAGNOSIS — G89.29 CHRONIC RIGHT-SIDED LOW BACK PAIN WITH RIGHT-SIDED SCIATICA: ICD-10-CM

## 2024-02-01 PROCEDURE — 97110 THERAPEUTIC EXERCISES: CPT

## 2024-02-01 PROCEDURE — 97140 MANUAL THERAPY 1/> REGIONS: CPT

## 2024-02-01 NOTE — PROGRESS NOTES
"Daily Note     Today's date: 2024  Patient name: Rodney Maldonado  : 1950  MRN: [id number]  Referring provider: Saravanan Escobedo DO  Dx:   Encounter Diagnosis     ICD-10-CM    1. Lumbar radiculitis - Right  M54.16       2. Chronic right-sided low back pain with right-sided sciatica  M54.41     G89.29                      Subjective: Pt reports he feels \"actually not too bad this morning\" upon arrival to treatment. He continues to have numbness into his R foot. He has been performing repeated lumbar flexion 4-5x/day and reports it has been somewhat helpful.    Objective: See treatment diary below    Assessment: Pt tolerated treatment well. Pt completes exercises with min cues for recall and technique. Slight lateral trunk lean towards contralateral side during KB carries with correction following cues. No significant change in symptoms post treatment. Will continue to monitor symptoms and progress as able. Pt would benefit from continued skilled PT to improve current deficits and maximize function.    Plan: Continue per plan of care.  Progress treatment as tolerated.       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    Inflammatory arthritis    Preop examination    Vitamin D deficiency    Refractive amblyopia    Retinal dot hemorrhage    Presbyopia    Polyp of colon    Nonexudative senile macular degeneration of retina    Drusen (degenerative) of macula, left eye    Insomnia    Essential hypertension    Mixed hyperlipidemia    Ganglion    Depressive disorder    Combined form of senile cataract    Chronic bronchitis (HCC)    Asthma    Allergic rhinitis    Allergic conjunctivitis    Alcohol abuse    Suicidal ideation    " "Chronic seborrheic dermatitis    Bilateral carotid artery stenosis    Gastroesophageal reflux disease    Benign prostatic hyperplasia with weak urinary stream    History of hyperparathyroidism    Acute pain of right shoulder    Psoriasis    Bilateral impacted cerumen    Arthropathic psoriasis, unspecified (HCC)    Hyperglycemia    Chronic pain of right knee    Post-traumatic stress disorder, chronic    Mild protein-calorie malnutrition (HCC)    Chronic pain syndrome    Neck pain - Right    Cervical radiculopathy    Myofascial pain syndrome    High risk medication use    Major depressive disorder, single episode in full remission (HCC)    Cataracta    Pre-op examination    Encounter for immunization    Sciatica of right side    Right hip pain         1/10 1/12 1/15 1/18 1/22 1/25 1/30 2/1     FOTO IE    NV  RE       IE/RE IE     RE          Manuals             SL/Prone lumbar PS STM   Prone ML  Prone ML  Prone ML  Prone ML  NV  ML  SE           L SL rotation mob NV                                  Neuro Re-Ed             90/90 Nerve glide  demo   Bridge 5\" x15  90/90 nerve glide 5\" x10         Seated slump slider   5\" x10  5\" x10   5\" x10 ea         Supine ball crush    Stand ball crush 5\" x15             [place] paloff press 8.0# 5\" x10 ea  [place] paloff press 8.0# 5\" x10 ea    [place] paloff press 8.0# 5\" x10 ea  [place] palof press 8.0#  5\"x10 ea            Repeated flex x10  Repeated flex x10                               Ther Ex             Bike   X5 min  X5 min  X6 min  X6 min X6 min  X6 min  X6 min     Seated HS stretch  demo  [place] walk out 10.0# x8  [place] walk out 10.0# x10          LTR  demo Sink stretch 10\"X5  Sink stretch 15\"X5  Sink stretch 15\"X5  Sink stretch 15\"X5   Sink stretch 15\" x5  Sink stretch 15\"x5      Supine piriformis stretch demo  [place] Ext 10.0# x10  [place] Ext 10.0# x15    [place] Ext 10.0# 2x10  [place] ext  10.0#  2x10     SKTC   20\"x3 ea    Sup piri stretch 15\"x5   Sup piri stretch 15\"x5  " "Supine piri  Stretch  15\"x5       SL open book stretch 5\" x10 ea    LTR 10\"x5 ea   LTR 10\"x5 ea  LTR  10\"x5 ea       Lumbar side stretch with foot up on blue mat 10\"x5  Lumbar side stretch with foot up on blue mat 15\"x4 Lumbar side stretch with foot up on blue mat 15\"x5 Lumbar side stretch with foot up on blue mat 15\"x5  Suitcase carry 5# KB x2 laps ea  Suitcase carry 5# KB x3 laps ea                               Modalities                          Traction if needed                Access Code: [email or web address]  URL: [email or web address]  Date: 01/10/2024  Prepared by: Tracie Gooden    Exercises  - Seated Hamstring Stretch  - 2 x daily - 7 x weekly - 4 reps - 15 hold  - Lower Trunk Rotations  - 2 x daily - 7 x weekly - 5 reps - 10 hold  - Supine Piriformis Stretch with Foot on Ground  - 2 x daily - 7 x weekly - 4 reps - 15 hold  - Supine Sciatic Nerve Glide  - 2 x daily - 7 x weekly - 10 reps - 5 hold                     "

## 2024-02-05 ENCOUNTER — OFFICE VISIT (OUTPATIENT)
Dept: PHYSICAL THERAPY | Facility: REHABILITATION | Age: 74
End: 2024-02-05
Payer: MEDICARE

## 2024-02-05 DIAGNOSIS — M54.16 LUMBAR RADICULITIS: Primary | ICD-10-CM

## 2024-02-05 DIAGNOSIS — M54.41 CHRONIC RIGHT-SIDED LOW BACK PAIN WITH RIGHT-SIDED SCIATICA: ICD-10-CM

## 2024-02-05 DIAGNOSIS — G89.29 CHRONIC RIGHT-SIDED LOW BACK PAIN WITH RIGHT-SIDED SCIATICA: ICD-10-CM

## 2024-02-05 PROCEDURE — 97110 THERAPEUTIC EXERCISES: CPT

## 2024-02-05 PROCEDURE — 97140 MANUAL THERAPY 1/> REGIONS: CPT

## 2024-02-05 NOTE — PROGRESS NOTES
Daily Note     Today's date: 2024  Patient name: Rodney Maldonado  : 1950  MRN: [id number]  Referring provider: Saravanan Escobedo DO  Dx:   Encounter Diagnosis     ICD-10-CM    1. Lumbar radiculitis - Right  M54.16       2. Chronic right-sided low back pain with right-sided sciatica  M54.41     G89.29                      Subjective: Pt reports that he had some soreness after last visit but it was just muscle soreness not a bad pain. He reports he still has n/t in his foot but it may be a little better than it was before.       Objective: See treatment diary below      Assessment: Pt tolerated treatment well.  Pt did well with new exercises on this date. Pt was able to complete all exercises without worsening pain in his back or increase in RLE numbness. Patient demonstrated fatigue post treatment, exhibited good technique with therapeutic exercises, and would benefit from continued PT.       Plan: Continue per plan of care.  Progress treatment as tolerated.       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    Inflammatory arthritis    Preop examination    Vitamin D deficiency    Refractive amblyopia    Retinal dot hemorrhage    Presbyopia    Polyp of colon    Nonexudative senile macular degeneration of retina    Drusen (degenerative) of macula, left eye    Insomnia    Essential hypertension    Mixed hyperlipidemia    Ganglion    Depressive disorder    Combined form of senile cataract    Chronic bronchitis (HCC)    Asthma    Allergic rhinitis    Allergic conjunctivitis    Alcohol abuse    Suicidal ideation    Chronic seborrheic dermatitis    Bilateral carotid artery stenosis    Gastroesophageal reflux disease    Benign  "prostatic hyperplasia with weak urinary stream    History of hyperparathyroidism    Acute pain of right shoulder    Psoriasis    Bilateral impacted cerumen    Arthropathic psoriasis, unspecified (HCC)    Hyperglycemia    Chronic pain of right knee    Post-traumatic stress disorder, chronic    Mild protein-calorie malnutrition (HCC)    Chronic pain syndrome    Neck pain - Right    Cervical radiculopathy    Myofascial pain syndrome    High risk medication use    Major depressive disorder, single episode in full remission (HCC)    Cataracta    Pre-op examination    Encounter for immunization    Sciatica of right side    Right hip pain         1/10 1/12 1/15 1/18 1/22 1/25 1/30 2/1 2/5    FOTO IE    NV  RE       IE/RE IE     RE          Manuals             SL/Prone lumbar PS STM   Prone ML  Prone ML  Prone ML  Prone ML  NV  ML  SE ML          L SL rotation mob NV                                  Neuro Re-Ed             90/90 Nerve glide  demo   Bridge 5\" x15  90/90 nerve glide 5\" x10     Bridge 5\" x15     Seated slump slider   5\" x10  5\" x10   5\" x10 ea     Supine TA with march 5\" x10 ea     Supine ball crush    Stand ball crush 5\" x15      Supine ball crush with TA 5\" x15        [place] paloff press 8.0# 5\" x10 ea  [place] paloff press 8.0# 5\" x10 ea    [place] paloff press 8.0# 5\" x10 ea  [place] palof press 8.0#  5\"x10 ea [place] palof press 9.0#  5\"x10 ea           Repeated flex x10  Repeated flex x10                               Ther Ex             Bike   X5 min  X5 min  X6 min  X6 min X6 min  X6 min  X6 min X6 min     Seated HS stretch  demo  [place] walk out 10.0# x8  [place] walk out 10.0# x10          LTR  demo Sink stretch 10\"X5  Sink stretch 15\"X5  Sink stretch 15\"X5  Sink stretch 15\"X5   Sink stretch 15\" x5  Sink stretch 15\"x5      Supine piriformis stretch demo  [place] Ext 10.0# x10  [place] Ext 10.0# x15    [place] Ext 10.0# 2x10  [place] ext  10.0#  2x10 [place] ext 11.0# 2x10     SKTC   20\"x3 ea    Sup piri " "stretch 15\"x5   Sup piri stretch 15\"x5  Supine piri  Stretch  15\"x5 Supine piri  Stretch  15\"x5      SL open book stretch 5\" x10 ea    LTR 10\"x5 ea   LTR 10\"x5 ea  LTR  10\"x5 ea LTR  10\"x5 ea      Lumbar side stretch with foot up on blue mat 10\"x5  Lumbar side stretch with foot up on blue mat 15\"x4 Lumbar side stretch with foot up on blue mat 15\"x5 Lumbar side stretch with foot up on blue mat 15\"x5  Suitcase carry 5# KB x2 laps ea  Suitcase carry 5# KB x3 laps ea Suitcase carry 5# KB x3 laps ea                              Modalities                          Traction if needed                Access Code: [email or web address]  URL: [email or web address]  Date: 01/10/2024  Prepared by: Tracie Gooden    Exercises  - Seated Hamstring Stretch  - 2 x daily - 7 x weekly - 4 reps - 15 hold  - Lower Trunk Rotations  - 2 x daily - 7 x weekly - 5 reps - 10 hold  - Supine Piriformis Stretch with Foot on Ground  - 2 x daily - 7 x weekly - 4 reps - 15 hold  - Supine Sciatic Nerve Glide  - 2 x daily - 7 x weekly - 10 reps - 5 hold                       "

## 2024-02-08 ENCOUNTER — OFFICE VISIT (OUTPATIENT)
Dept: PHYSICAL THERAPY | Facility: REHABILITATION | Age: 74
End: 2024-02-08
Payer: MEDICARE

## 2024-02-08 DIAGNOSIS — M54.41 CHRONIC RIGHT-SIDED LOW BACK PAIN WITH RIGHT-SIDED SCIATICA: ICD-10-CM

## 2024-02-08 DIAGNOSIS — M54.16 LUMBAR RADICULITIS: Primary | ICD-10-CM

## 2024-02-08 DIAGNOSIS — G89.29 CHRONIC RIGHT-SIDED LOW BACK PAIN WITH RIGHT-SIDED SCIATICA: ICD-10-CM

## 2024-02-08 PROCEDURE — 97112 NEUROMUSCULAR REEDUCATION: CPT

## 2024-02-08 PROCEDURE — 97110 THERAPEUTIC EXERCISES: CPT

## 2024-02-08 NOTE — PROGRESS NOTES
Daily Note     Today's date: 2024  Patient name: Rodney Maldonado  : 1950  MRN: [id number]  Referring provider: Saravanan Escobedo DO  Dx:   Encounter Diagnosis     ICD-10-CM    1. Lumbar radiculitis - Right  M54.16       2. Chronic right-sided low back pain with right-sided sciatica  M54.41     G89.29           Start Time: 09  Stop Time: 1021  Total time in clinic (min): 37 minutes    Subjective: Patient reports seeing minimal improvements since starting therapy.      Objective: See treatment diary below      Assessment: Patient tolerated treatment well. Good form and understanding of exercises performed. Patient reports no change in pain post treatment. Reports most relief of symptoms when performing repeated flexion in standing. Pt will benefit from continued skilled PT intervention in order to address remaining limitations and to restore maximal function.         Plan: Continue per plan of care.      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    Inflammatory arthritis    Preop examination    Vitamin D deficiency    Refractive amblyopia    Retinal dot hemorrhage    Presbyopia    Polyp of colon    Nonexudative senile macular degeneration of retina    Drusen (degenerative) of macula, left eye    Insomnia    Essential hypertension    Mixed hyperlipidemia    Ganglion    Depressive disorder    Combined form of senile cataract    Chronic bronchitis (HCC)    Asthma    Allergic rhinitis    Allergic conjunctivitis    Alcohol abuse    Suicidal ideation    Chronic seborrheic dermatitis    Bilateral carotid artery stenosis    Gastroesophageal reflux disease    Benign prostatic hyperplasia with weak urinary stream    History of  "hyperparathyroidism    Acute pain of right shoulder    Psoriasis    Bilateral impacted cerumen    Arthropathic psoriasis, unspecified (HCC)    Hyperglycemia    Chronic pain of right knee    Post-traumatic stress disorder, chronic    Mild protein-calorie malnutrition (HCC)    Chronic pain syndrome    Neck pain - Right    Cervical radiculopathy    Myofascial pain syndrome    High risk medication use    Major depressive disorder, single episode in full remission (HCC)    Cataracta    Pre-op examination    Encounter for immunization    Sciatica of right side    Right hip pain         1/10 1/12 1/15 1/18 1/22 1/25 1/30 2/1 2/5 2/8   FOTO IE    NV  RE    nv   IE/RE IE     RE          Manuals             SL/Prone lumbar PS STM   Prone ML  Prone ML  Prone ML  Prone ML  NV  ML  SE ML JG         L SL rotation mob NV                                  Neuro Re-Ed             90/90 Nerve glide  demo   Bridge 5\" x15  90/90 nerve glide 5\" x10     Bridge 5\" x15  Bridge 5\" x15    Seated slump slider   5\" x10  5\" x10   5\" x10 ea     Supine TA with march 5\" x10 ea  Supine TA with march 5\" x15 ea    Supine ball crush    Stand ball crush 5\" x15      Supine ball crush with TA 5\" x15  Supine ball crush with TA 5\" x15       [place] paloff press 8.0# 5\" x10 ea  [place] paloff press 8.0# 5\" x10 ea    [place] paloff press 8.0# 5\" x10 ea  [place] palof press 8.0#  5\"x10 ea [place] palof press 9.0#  5\"x10 ea [place] palof press 9.0#  5\"x10 ea          Repeated flex x10  Repeated flex x10  Repeated flexion x10                             Ther Ex             Bike   X5 min  X5 min  X6 min  X6 min X6 min  X6 min  X6 min X6 min  X6 min   Seated HS stretch  demo  [place] walk out 10.0# x8  [place] walk out 10.0# x10          LTR  demo Sink stretch 10\"X5  Sink stretch 15\"X5  Sink stretch 15\"X5  Sink stretch 15\"X5   Sink stretch 15\" x5  Sink stretch 15\"x5   Sink stretch 15\"x5   Supine piriformis stretch demo  [place] Ext 10.0# x10  [place] Ext 10.0# x15    [place] " "Ext 10.0# 2x10  [place] ext  10.0#  2x10 [place] ext 11.0# 2x10  [place] ext 11.0# 2x10    SKTC   20\"x3 ea    Sup piri stretch 15\"x5   Sup piri stretch 15\"x5  Supine piri  Stretch  15\"x5 Supine piri  Stretch  15\"x5 Supine piri  Stretch  15\"x5     SL open book stretch 5\" x10 ea    LTR 10\"x5 ea   LTR 10\"x5 ea  LTR  10\"x5 ea LTR  10\"x5 ea LTR  10\"x10 ea     Lumbar side stretch with foot up on blue mat 10\"x5  Lumbar side stretch with foot up on blue mat 15\"x4 Lumbar side stretch with foot up on blue mat 15\"x5 Lumbar side stretch with foot up on blue mat 15\"x5  Suitcase carry 5# KB x2 laps ea  Suitcase carry 5# KB x3 laps ea Suitcase carry 5# KB x3 laps ea Suitcase carry 5# KB x3 laps ea                             Modalities                          Traction if needed                Access Code: [email or web address]  URL: [email or web address]  Date: 01/10/2024  Prepared by: Tracie Gooden    Exercises  - Seated Hamstring Stretch  - 2 x daily - 7 x weekly - 4 reps - 15 hold  - Lower Trunk Rotations  - 2 x daily - 7 x weekly - 5 reps - 10 hold  - Supine Piriformis Stretch with Foot on Ground  - 2 x daily - 7 x weekly - 4 reps - 15 hold  - Supine Sciatic Nerve Glide  - 2 x daily - 7 x weekly - 10 reps - 5 hold                         "

## 2024-02-12 ENCOUNTER — OFFICE VISIT (OUTPATIENT)
Dept: PHYSICAL THERAPY | Facility: REHABILITATION | Age: 74
End: 2024-02-12
Payer: MEDICARE

## 2024-02-12 DIAGNOSIS — G89.29 CHRONIC RIGHT-SIDED LOW BACK PAIN WITH RIGHT-SIDED SCIATICA: ICD-10-CM

## 2024-02-12 DIAGNOSIS — M54.16 LUMBAR RADICULITIS: Primary | ICD-10-CM

## 2024-02-12 DIAGNOSIS — M54.41 CHRONIC RIGHT-SIDED LOW BACK PAIN WITH RIGHT-SIDED SCIATICA: ICD-10-CM

## 2024-02-12 PROCEDURE — 97112 NEUROMUSCULAR REEDUCATION: CPT

## 2024-02-12 PROCEDURE — 97140 MANUAL THERAPY 1/> REGIONS: CPT

## 2024-02-12 PROCEDURE — 97110 THERAPEUTIC EXERCISES: CPT

## 2024-02-12 NOTE — PROGRESS NOTES
Daily Note     Today's date: 2024  Patient name: Rodney Maldonado  : 1950  MRN: [id number]  Referring provider: Saravanan Escobedo DO  Dx:   Encounter Diagnosis     ICD-10-CM    1. Lumbar radiculitis - Right  M54.16       2. Chronic right-sided low back pain with right-sided sciatica  M54.41     G89.29                      Subjective: Pt reports that he is doing okay, he reports that his back is a little sore today without anything different over the past couple of days. He reports that he did not have any issues with pain or n/t after last session.       Objective: See treatment diary below      Assessment: Pt tolerated treatment well. Pt did well with progressions of exercises on today's date and was able to complete all without increased pain or discomfort in his back or leg. Mild increase in RLE numbness after jenni exercises which was decreased with repeated forward flexion stretching. Noted increased low back work when his R side was towards the wall with the paloff press. Patient demonstrated fatigue post treatment, exhibited good technique with therapeutic exercises, and would benefit from continued PT.       Plan: Continue per plan of care.  Progress treatment as tolerated.       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    Inflammatory arthritis    Preop examination    Vitamin D deficiency    Refractive amblyopia    Retinal dot hemorrhage    Presbyopia    Polyp of colon    Nonexudative senile macular degeneration of retina    Drusen (degenerative) of macula, left eye    Insomnia    Essential hypertension    Mixed hyperlipidemia    Ganglion    Depressive disorder    Combined form of senile cataract    Chronic  "bronchitis (HCC)    Asthma    Allergic rhinitis    Allergic conjunctivitis    Alcohol abuse    Suicidal ideation    Chronic seborrheic dermatitis    Bilateral carotid artery stenosis    Gastroesophageal reflux disease    Benign prostatic hyperplasia with weak urinary stream    History of hyperparathyroidism    Acute pain of right shoulder    Psoriasis    Bilateral impacted cerumen    Arthropathic psoriasis, unspecified (HCC)    Hyperglycemia    Chronic pain of right knee    Post-traumatic stress disorder, chronic    Mild protein-calorie malnutrition (HCC)    Chronic pain syndrome    Neck pain - Right    Cervical radiculopathy    Myofascial pain syndrome    High risk medication use    Major depressive disorder, single episode in full remission (HCC)    Cataracta    Pre-op examination    Encounter for immunization    Sciatica of right side    Right hip pain         2/12 1/18 1/22 1/25 1/30 2/1 2/5 2/8   FOTO     NV  RE    nv   IE/RE      RE          Manuals             SL/Prone lumbar PS STM  ML   Prone ML  Prone ML  NV  ML  SE ML JG         L SL rotation mob NV                                  Neuro Re-Ed             90/90 Nerve glide  Bridge with blue abd 5\" x15    Bridge 5\" x15  90/90 nerve glide 5\" x10     Bridge 5\" x15  Bridge 5\" x15    Seated slump slider  Supine opposite hand/knee crush with TA 5\" x15 ea     5\" x10 ea     Supine TA with march 5\" x10 ea  Supine TA with march 5\" x15 ea    Supine ball crush Supine ball crush with TA 5\" x15    Stand ball crush 5\" x15      Supine ball crush with TA 5\" x15  Supine ball crush with TA 5\" x15     [place] palof press 9.0#  5\"x10 ea   [place] paloff press 8.0# 5\" x10 ea    [place] paloff press 8.0# 5\" x10 ea  [place] palof press 8.0#  5\"x10 ea [place] palof press 9.0#  5\"x10 ea [place] palof press 9.0#  5\"x10 ea    [place] rotation 5.5# 5\"x10 ea              Repeated flexion x10      Repeated flex x10  Repeated flex x10  Repeated flexion x10                             Ther Ex " "            Bike  X6 min    X6 min  X6 min X6 min  X6 min  X6 min X6 min  X6 min   Seated HS stretch  [place] resisted walk 11.0# x5 laps    [place] walk out 10.0# x10          LTR  Sink stretch 15\"x5    Sink stretch 15\"X5  Sink stretch 15\"X5   Sink stretch 15\" x5  Sink stretch 15\"x5   Sink stretch 15\"x5   Supine piriformis stretch    [place] Ext 10.0# x15    [place] Ext 10.0# 2x10  [place] ext  10.0#  2x10 [place] ext 11.0# 2x10  [place] ext 11.0# 2x10    SKTC  Supine piri  Stretch  15\"x5    Sup piri stretch 15\"x5   Sup piri stretch 15\"x5  Supine piri  Stretch  15\"x5 Supine piri  Stretch  15\"x5 Supine piri  Stretch  15\"x5    LTR  10\"x10 ea    LTR 10\"x5 ea   LTR 10\"x5 ea  LTR  10\"x5 ea LTR  10\"x5 ea LTR  10\"x10 ea       Lumbar side stretch with foot up on blue mat 15\"x5 Lumbar side stretch with foot up on blue mat 15\"x5  Suitcase carry 5# KB x2 laps ea  Suitcase carry 5# KB x3 laps ea Suitcase carry 5# KB x3 laps ea Suitcase carry 5# KB x3 laps ea                             Modalities                          Traction if needed                Access Code: [email or web address]  URL: [email or web address]  Date: 01/10/2024  Prepared by: Tracie Gooden    Exercises  - Seated Hamstring Stretch  - 2 x daily - 7 x weekly - 4 reps - 15 hold  - Lower Trunk Rotations  - 2 x daily - 7 x weekly - 5 reps - 10 hold  - Supine Piriformis Stretch with Foot on Ground  - 2 x daily - 7 x weekly - 4 reps - 15 hold  - Supine Sciatic Nerve Glide  - 2 x daily - 7 x weekly - 10 reps - 5 hold                           "

## 2024-02-15 ENCOUNTER — OFFICE VISIT (OUTPATIENT)
Dept: PHYSICAL THERAPY | Facility: REHABILITATION | Age: 74
End: 2024-02-15
Payer: MEDICARE

## 2024-02-15 DIAGNOSIS — G89.29 CHRONIC RIGHT-SIDED LOW BACK PAIN WITH RIGHT-SIDED SCIATICA: ICD-10-CM

## 2024-02-15 DIAGNOSIS — M54.16 LUMBAR RADICULITIS: Primary | ICD-10-CM

## 2024-02-15 DIAGNOSIS — M54.41 CHRONIC RIGHT-SIDED LOW BACK PAIN WITH RIGHT-SIDED SCIATICA: ICD-10-CM

## 2024-02-15 PROCEDURE — 97112 NEUROMUSCULAR REEDUCATION: CPT

## 2024-02-15 PROCEDURE — 97110 THERAPEUTIC EXERCISES: CPT

## 2024-02-15 NOTE — PROGRESS NOTES
Daily Note     Today's date: 2/15/2024  Patient name: Rodney Maldonado  : 1950  MRN: [id number]  Referring provider: Saravanan Escobedo DO  Dx:   Encounter Diagnosis     ICD-10-CM    1. Lumbar radiculitis - Right  M54.16       2. Chronic right-sided low back pain with right-sided sciatica  M54.41     G89.29                      Subjective: Pt reports that shoveling went better for him than he anticipated but he was sore and stiff yesterday.       Objective: See treatment diary below      Assessment: Pt tolerated treatment well. Good carryover of mechanics and positioning regarding exercises from previous session.  Pt reported that he felt good throughout exercise but lying prone for STM gave him a but of n/t in his leg. Patient demonstrated fatigue post treatment, exhibited good technique with therapeutic exercises, and would benefit from continued PT.       Plan: Continue per plan of care.  Progress treatment as tolerated.       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    Inflammatory arthritis    Preop examination    Vitamin D deficiency    Refractive amblyopia    Retinal dot hemorrhage    Presbyopia    Polyp of colon    Nonexudative senile macular degeneration of retina    Drusen (degenerative) of macula, left eye    Insomnia    Essential hypertension    Mixed hyperlipidemia    Ganglion    Depressive disorder    Combined form of senile cataract    Chronic bronchitis (HCC)    Asthma    Allergic rhinitis    Allergic conjunctivitis    Alcohol abuse    Suicidal ideation    Chronic seborrheic dermatitis    Bilateral carotid artery stenosis    Gastroesophageal reflux disease    Benign prostatic hyperplasia with weak urinary  "stream    History of hyperparathyroidism    Acute pain of right shoulder    Psoriasis    Bilateral impacted cerumen    Arthropathic psoriasis, unspecified (HCC)    Hyperglycemia    Chronic pain of right knee    Post-traumatic stress disorder, chronic    Mild protein-calorie malnutrition (HCC)    Chronic pain syndrome    Neck pain - Right    Cervical radiculopathy    Myofascial pain syndrome    High risk medication use    Major depressive disorder, single episode in full remission (HCC)    Cataracta    Pre-op examination    Encounter for immunization    Sciatica of right side    Right hip pain         2/12 2/15  1/18 1/22 1/25 1/30 2/1 2/5 2/8   FOTO     NV  RE    nv   IE/RE      RE          Manuals             SL/Prone lumbar PS STM  ML ML Side lying Prone ML  Prone ML  NV  ML  SE ML JG         L SL rotation mob NV                                  Neuro Re-Ed             90/90 Nerve glide  Bridge with blue abd 5\" x15  Bridge with blue abd 5\" x15   Bridge 5\" x15  90/90 nerve glide 5\" x10     Bridge 5\" x15  Bridge 5\" x15      [place] TB clam 5\" x10 ea            Seated slump slider  Supine opposite hand/knee crush with TA 5\" x15 ea  Supine opposite hand/knee crush with TA 5\" x15 ea    5\" x10 ea     Supine TA with march 5\" x10 ea  Supine TA with march 5\" x15 ea    Supine ball crush Supine ball crush with TA 5\" x15  Supine ball crush with TA 5\" x15   Stand ball crush 5\" x15      Supine ball crush with TA 5\" x15  Supine ball crush with TA 5\" x15     [place] palof press 9.0#  5\"x10 ea [place] palof press 9.0#  5\"x10 ea  [place] paloff press 8.0# 5\" x10 ea    [place] paloff press 8.0# 5\" x10 ea  [place] palof press 8.0#  5\"x10 ea [place] palof press 9.0#  5\"x10 ea [place] palof press 9.0#  5\"x10 ea    [place] rotation 5.5# 5\"x10 ea  [place] rotation 6.0# 5\"x10 ea             Repeated flexion x10      Repeated flex x10  Repeated flex x10  Repeated flexion x10                             Ther Ex             Bike  X6 min  X6 min   X6 min " " X6 min X6 min  X6 min  X6 min X6 min  X6 min   Seated HS stretch  [place] resisted walk 11.0# x5 laps  [place] resisted walk 12.0# x10 laps   [place] walk out 10.0# x10          LTR  Sink stretch 15\"x5    Sink stretch 15\"X5  Sink stretch 15\"X5   Sink stretch 15\" x5  Sink stretch 15\"x5   Sink stretch 15\"x5   Supine piriformis stretch    [place] Ext 10.0# x15    [place] Ext 10.0# 2x10  [place] ext  10.0#  2x10 [place] ext 11.0# 2x10  [place] ext 11.0# 2x10    SKTC  Supine piri  Stretch  15\"x5 Supine piri  Stretch  15\"x5   Sup piri stretch 15\"x5   Sup piri stretch 15\"x5  Supine piri  Stretch  15\"x5 Supine piri  Stretch  15\"x5 Supine piri  Stretch  15\"x5    LTR  10\"x10 ea LTR  10\"x10 ea   LTR 10\"x5 ea   LTR 10\"x5 ea  LTR  10\"x5 ea LTR  10\"x5 ea LTR  10\"x10 ea       Lumbar side stretch with foot up on blue mat 15\"x5 Lumbar side stretch with foot up on blue mat 15\"x5  Suitcase carry 5# KB x2 laps ea  Suitcase carry 5# KB x3 laps ea Suitcase carry 5# KB x3 laps ea Suitcase carry 5# KB x3 laps ea                             Modalities                          Traction if needed                Access Code: [email or web address]  URL: [email or web address]  Date: 01/10/2024  Prepared by: Tracie Gooden    Exercises  - Seated Hamstring Stretch  - 2 x daily - 7 x weekly - 4 reps - 15 hold  - Lower Trunk Rotations  - 2 x daily - 7 x weekly - 5 reps - 10 hold  - Supine Piriformis Stretch with Foot on Ground  - 2 x daily - 7 x weekly - 4 reps - 15 hold  - Supine Sciatic Nerve Glide  - 2 x daily - 7 x weekly - 10 reps - 5 hold                             "

## 2024-02-19 ENCOUNTER — OFFICE VISIT (OUTPATIENT)
Dept: PHYSICAL THERAPY | Facility: REHABILITATION | Age: 74
End: 2024-02-19
Payer: MEDICARE

## 2024-02-19 DIAGNOSIS — M54.41 CHRONIC RIGHT-SIDED LOW BACK PAIN WITH RIGHT-SIDED SCIATICA: ICD-10-CM

## 2024-02-19 DIAGNOSIS — M54.16 LUMBAR RADICULITIS: Primary | ICD-10-CM

## 2024-02-19 DIAGNOSIS — G89.29 CHRONIC RIGHT-SIDED LOW BACK PAIN WITH RIGHT-SIDED SCIATICA: ICD-10-CM

## 2024-02-19 PROCEDURE — 97112 NEUROMUSCULAR REEDUCATION: CPT

## 2024-02-19 PROCEDURE — 97110 THERAPEUTIC EXERCISES: CPT

## 2024-02-19 NOTE — PROGRESS NOTES
Daily Note     Today's date: 2024  Patient name: Rodney Maldonado  : 1950  MRN: [id number]  Referring provider: Saravanan Escobedo DO  Dx:   Encounter Diagnosis     ICD-10-CM    1. Lumbar radiculitis - Right  M54.16       2. Chronic right-sided low back pain with right-sided sciatica  M54.41     G89.29                      Subjective: Pt reports that his back is a little sore and his leg has some numbness both of which he feels like was aggravated from the snow shoveling he had to do last week.       Objective: See treatment diary below      Assessment: Pt tolerated treatment well. Pt did well with all exercises this date without increased symptoms in his back or leg throughout. STM was completed in side lying on this date due to pts reports of increased numbness in his leg after last visit when STM was completed in prone, however pt reported no significant difference. Patient demonstrated fatigue post treatment, exhibited good technique with therapeutic exercises, and would benefit from continued PT.       Plan: Continue per plan of care.  Progress treatment as tolerated.       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    Inflammatory arthritis    Preop examination    Vitamin D deficiency    Refractive amblyopia    Retinal dot hemorrhage    Presbyopia    Polyp of colon    Nonexudative senile macular degeneration of retina    Drusen (degenerative) of macula, left eye    Insomnia    Essential hypertension    Mixed hyperlipidemia    Ganglion    Depressive disorder    Combined form of senile cataract    Chronic bronchitis (HCC)    Asthma    Allergic rhinitis    Allergic conjunctivitis    Alcohol abuse    Suicidal ideation  "   Chronic seborrheic dermatitis    Bilateral carotid artery stenosis    Gastroesophageal reflux disease    Benign prostatic hyperplasia with weak urinary stream    History of hyperparathyroidism    Acute pain of right shoulder    Psoriasis    Bilateral impacted cerumen    Arthropathic psoriasis, unspecified (HCC)    Hyperglycemia    Chronic pain of right knee    Post-traumatic stress disorder, chronic    Mild protein-calorie malnutrition (HCC)    Chronic pain syndrome    Neck pain - Right    Cervical radiculopathy    Myofascial pain syndrome    High risk medication use    Major depressive disorder, single episode in full remission (HCC)    Cataracta    Pre-op examination    Encounter for immunization    Sciatica of right side    Right hip pain         2/12 2/15 2/19   1/25 1/30 2/1 2/5 2/8   FOTO      RE    nv   IE/RE      RE          Manuals             SL/Prone lumbar PS STM  ML ML Side lying ML    NV  ML  SE ML JG         L SL rotation mob NV                                  Neuro Re-Ed             90/90 Nerve glide  Bridge with blue abd 5\" x15  Bridge with blue abd 5\" x15  Bridge with blue abd 5\" x15       Bridge 5\" x15  Bridge 5\" x15      Blue TB clam 5\" x10 ea  Blue TB clam 5\" x10 ea           Seated slump slider  Supine opposite hand/knee crush with TA 5\" x15 ea  Supine opposite hand/knee crush with TA 5\" x15 ea  Supine opposite hand/knee crush with TA 5\" x15 ea       Supine TA with march 5\" x10 ea  Supine TA with march 5\" x15 ea    Supine ball crush Supine ball crush with TA 5\" x15  Supine ball crush with TA 5\" x15  Supine ball crush with TA 5\" x20       Supine ball crush with TA 5\" x15  Supine ball crush with TA 5\" x15     [place] palof press 9.0#  5\"x10 ea [place] palof press 9.0#  5\"x10 ea [place] paloff press 9.0#  5\"x10 ea    [place] paloff press 8.0# 5\" x10 ea  [place] palof press 8.0#  5\"x10 ea [place] palof press 9.0#  5\"x10 ea [place] palof press 9.0#  5\"x10 ea    [place] rotation 5.5# 5\"x10 ea  [place] " "rotation 6.0# 5\"x10 ea  [place] rotation 9.0# 5\"x10 ea            Repeated flexion x10      Repeated flex x10  Repeated flex x10  Repeated flexion x10                             Ther Ex             Bike  X6 min  X6 min  X6 min    X6 min  X6 min  X6 min X6 min  X6 min   Seated HS stretch  [place] resisted walk 11.0# x5 laps  [place] resisted walk 12.0# x10 laps  [place] resisted walk 12.0# x10 laps           LTR  Sink stretch 15\"x5       Sink stretch 15\" x5  Sink stretch 15\"x5   Sink stretch 15\"x5   Supine piriformis stretch       [place] Ext 10.0# 2x10  [place] ext  10.0#  2x10 [place] ext 11.0# 2x10  [place] ext 11.0# 2x10    SKTC  Supine piri  Stretch  15\"x5 Supine piri  Stretch  15\"x5 Supine piri  Stretch  15\"x5    Sup piri stretch 15\"x5  Supine piri  Stretch  15\"x5 Supine piri  Stretch  15\"x5 Supine piri  Stretch  15\"x5    LTR  10\"x10 ea LTR  10\"x10 ea LTR  10\"x10 ea    LTR 10\"x5 ea  LTR  10\"x5 ea LTR  10\"x5 ea LTR  10\"x10 ea          Suitcase carry 5# KB x2 laps ea  Suitcase carry 5# KB x3 laps ea Suitcase carry 5# KB x3 laps ea Suitcase carry 5# KB x3 laps ea                             Modalities                          Traction if needed                Access Code: [email or web address]  URL: [email or web address]  Date: 01/10/2024  Prepared by: Tracie Gooden    Exercises  - Seated Hamstring Stretch  - 2 x daily - 7 x weekly - 4 reps - 15 hold  - [place] Rotations  - 2 x daily - 7 x weekly - 5 reps - 10 hold  - Supine Piriformis Stretch with Foot on Ground  - 2 x daily - 7 x weekly - 4 reps - 15 hold  - Supine Sciatic Nerve Glide  - 2 x daily - 7 x weekly - 10 reps - 5 hold                               "

## 2024-02-21 PROBLEM — H61.23 BILATERAL IMPACTED CERUMEN: Status: RESOLVED | Noted: 2020-09-10 | Resolved: 2024-02-21

## 2024-02-21 PROBLEM — H10.10 ALLERGIC CONJUNCTIVITIS: Status: RESOLVED | Noted: 2020-06-22 | Resolved: 2024-02-21

## 2024-02-22 ENCOUNTER — EVALUATION (OUTPATIENT)
Dept: PHYSICAL THERAPY | Facility: REHABILITATION | Age: 74
End: 2024-02-22
Payer: MEDICARE

## 2024-02-22 DIAGNOSIS — M54.16 LUMBAR RADICULITIS: Primary | ICD-10-CM

## 2024-02-22 DIAGNOSIS — G89.29 CHRONIC RIGHT-SIDED LOW BACK PAIN WITH RIGHT-SIDED SCIATICA: ICD-10-CM

## 2024-02-22 DIAGNOSIS — M54.41 CHRONIC RIGHT-SIDED LOW BACK PAIN WITH RIGHT-SIDED SCIATICA: ICD-10-CM

## 2024-02-22 PROCEDURE — 97110 THERAPEUTIC EXERCISES: CPT

## 2024-02-22 PROCEDURE — 97164 PT RE-EVAL EST PLAN CARE: CPT

## 2024-02-26 ENCOUNTER — OFFICE VISIT (OUTPATIENT)
Dept: PAIN MEDICINE | Facility: MEDICAL CENTER | Age: 74
End: 2024-02-26
Payer: MEDICARE

## 2024-02-26 VITALS
DIASTOLIC BLOOD PRESSURE: 79 MMHG | HEIGHT: 70 IN | OXYGEN SATURATION: 94 % | BODY MASS INDEX: 22.33 KG/M2 | SYSTOLIC BLOOD PRESSURE: 133 MMHG | HEART RATE: 80 BPM | WEIGHT: 156 LBS

## 2024-02-26 DIAGNOSIS — M54.16 RIGHT LUMBAR RADICULITIS: Primary | ICD-10-CM

## 2024-02-26 PROCEDURE — 99214 OFFICE O/P EST MOD 30 MIN: CPT

## 2024-02-26 NOTE — PROGRESS NOTES
Assessment:  1. Right lumbar radiculitis        Plan:  Obtain MRI lumbar spine.  Continue home exercise program as taught by physical therapy.  Advised patient that based on MRI results, we can offer injection therapy if indicated.   Follow-up as needed.    My impressions and treatment recommendations were discussed in detail with the patient who verbalized understanding and had no further questions.  Discharge instructions were provided. I personally saw and examined the patient and I agree with the above discussed plan of care.    Orders Placed This Encounter   Procedures    MRI lumbar spine wo contrast     Standing Status:   Future     Standing Expiration Date:   2/26/2028     Scheduling Instructions:      There is no preparation for this test. Please leave your jewelry and valuables at home, wedding rings are the exception. All patients will be required to change into a hospital gown and pants.  Street clothes are not permitted in the MRI.  Magnetic nail polish must be removed prior to arrival for your test. Please bring your insurance cards, a form of photo ID and a list of your medications with you. Arrive 15 minutes prior to your appointment time in order to register. Please bring any prior CT or MRI studies of this area that were not performed at a [facility].            To schedule this appointment, please contact Central Scheduling at [phone].            Prior to your appointment, please make sure you complete the MRI Screening Form when you e-Check in for your appointment. This will be available starting 7 days before your appointment in [employer]. You may receive an e-mail with an activation code if you do not have a [employer] account. If you do not have access to a device, we will complete your screening at your appointment.     Order Specific Question:   What is the patient's sedation requirement? If Medication for Claustrophobia is selected, order medication at this point.     Answer:   No  Sedation     Order Specific Question:   Does the patient have metallic implants?     Answer:   No     Order Specific Question:   Does this procedure require the 3T MRI at [place] or [place]?     Answer:   No     Order Specific Question:   Release to patient through [facility]     Answer:   Immediate     Order Specific Question:   Is order priority selected as STAT?     Answer:   No     Order Specific Question:   Reason for Exam (FREE TEXT)     Answer:   Lumbar radiculitis, low back pain, unresponsive to > 6 weeks PT     Order Specific Question:   When should the test be performed?     Answer:   Elective- non urgent     No orders of the defined types were placed in this encounter.      History of Present Illness:  Rodney Maldonado is a 73 y.o. male with a history of cervical radiculopathy and myofascial pain syndrome who presents for a follow up office visit after completion of  > 6 weeks of physical therapy specific to low back and radiating right lower extremity pain. The patient reports that his pain at this point is intermittent, and typically worse at night. He describes the quality of his pain as dull/aching, throbbing, shooting, and numbness. He states that his pain ranges from a 0-7/10 in intensity, depending on level of activity and positioning.    Patient has completed a course of physical therapy, beginning on 1/10/2024 and concluding on 2/22/2024. The patient was discharged with a home exercise program, which he has been following as instructed.  The patient is able to report improvement in symptoms, however he does still have severe pain at times.  He believes that participating in physical therapy has reduced the number of episodes of severe pain that he has.     Current pain medication regimen consists of methocarbamol 500 mg once daily, as prescribed by his PCP.    I have personally reviewed and/or updated the patient's past medical history, past surgical history, family history, social history, current  medications, allergies, and vital signs today.     Review of Systems   Constitutional:  Negative for fever.   HENT:  Negative for hearing loss.    Eyes:  Negative for visual disturbance.   Respiratory:  Negative for cough.    Cardiovascular:  Negative for leg swelling.   Gastrointestinal:  Negative for abdominal pain and nausea.   Endocrine: Negative for polydipsia.   Genitourinary:  Negative for difficulty urinating.   Musculoskeletal:  Positive for back pain. Negative for gait problem and myalgias.   Skin:  Negative for rash.   Neurological:  Negative for numbness.   Hematological:  Does not bruise/bleed easily.   Psychiatric/Behavioral:  Negative for dysphoric mood and sleep disturbance.        Patient Active Problem List   Diagnosis    PSVT (paroxysmal supraventricular tachycardia)    ED (erectile dysfunction) of organic origin    Hypercalcemia    Hyperparathyroidism (HCC)    Lipid disorder    Nephrolithiasis    Osteopenia of multiple sites    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    Inflammatory arthritis    Preop examination    Vitamin D deficiency    Refractive amblyopia    Retinal dot hemorrhage    Presbyopia    Polyp of colon    Nonexudative senile macular degeneration of retina    Drusen (degenerative) of macula, left eye    Insomnia    Essential hypertension    Mixed hyperlipidemia    Ganglion    Depressive disorder    Combined form of senile cataract    Chronic bronchitis (HCC)    Asthma    Allergic rhinitis    Alcohol abuse    Suicidal ideation    Chronic seborrheic dermatitis    Bilateral carotid artery stenosis    Gastroesophageal reflux disease    Benign prostatic hyperplasia with weak urinary stream    History of hyperparathyroidism    Acute pain of right shoulder    Psoriasis    Arthropathic psoriasis, unspecified (HCC)    Hyperglycemia    Chronic pain of right knee    Post-traumatic stress disorder, chronic    Mild protein-calorie malnutrition (HCC)    Chronic pain  syndrome    Neck pain - Right    Cervical radiculopathy    Myofascial pain syndrome    High risk medication use    Major depressive disorder, single episode in full remission (HCC)    Cataracta    Pre-op examination    Encounter for immunization    Sciatica of right side    Right hip pain       Past Medical History:   Diagnosis Date    Anxiety     Arthritis     Depression     GERD (gastroesophageal reflux disease)     Gout     Hyperlipidemia     Hypertension     Kidney stone     Psoriatic arthritis (HCC)     PTSD (post-traumatic stress disorder)     SVT (supraventricular tachycardia)     Thyroid tumor, benign        Past Surgical History:   Procedure Laterality Date    CARDIAC ELECTROPHYSIOLOGY MAPPING AND ABLATION      SVT    PARATHYROIDECTOMY      PROSTATE SURGERY         Family History   Problem Relation Age of Onset    Coronary artery disease Brother     Hypertension Mother     No Known Problems Father        Social History     Occupational History    Not on file   Tobacco Use    Smoking status: Never    Smokeless tobacco: Never   Vaping Use    Vaping status: Never Used   Substance and Sexual Activity    Alcohol use: No    Drug use: No    Sexual activity: Not Currently       Current Outpatient Medications on File Prior to Visit   Medication Sig    albuterol (PROVENTIL HFA,VENTOLIN HFA) 90 mcg/act inhaler Inhale 2 puffs every 6 (six) hours as needed for wheezing or shortness of breath    atorvastatin (LIPITOR) 40 mg tablet Take 40 mg by mouth     azelastine (ASTELIN) 0.1 % nasal spray 1 spray into each nostril 2 (two) times a day Use in each nostril as directed    Carboxymethylcellulose Sod PF 0.25 % SOLN INSTILL ONE DROP BOTH EYES FOUR TIMES A DAY FOR DRY EYES    Cholecalciferol 2000 units CAPS Take 2,000 Units by mouth    dextran 70-hypromellose (GENTEAL TEARS) 0.1-0.3 % ophthalmic solution Apply 1 drop to eye    ketotifen (ZADITOR) 0.025 % ophthalmic solution INSTILL ONE DROP BOTH EYES TWICE A DAY     "leflunomide (ARAVA) 20 MG tablet Take 1 tablet (20 mg total) by mouth daily    methocarbamol (ROBAXIN) 500 mg tablet Take 1 tablet (500 mg total) by mouth daily at bedtime    mirtazapine (REMERON) 30 mg tablet     polyvinyl alcohol (LIQUIFILM TEARS) 1.4 % ophthalmic solution Apply 1 drop to eye    sertraline (ZOLOFT) 100 mg tablet Take 100 mg by mouth daily     sulfaSALAzine (AZULFIDINE) 500 mg tablet Take 2 tabs twice a day    ofloxacin (OCUFLOX) 0.3 % ophthalmic solution INSTILL 1 DROP INTO RIGHT EYE FOUR TIMES A DAY FOR EYE INFECTION USE AS DIRECTED (Patient not taking: Reported on 9/29/2023)     No current facility-administered medications on file prior to visit.       Allergies   Allergen Reactions    Cefadroxil Other (See Comments)     bleeding internally    Midazolam Other (See Comments)     ARRHYTHMIA    Prazosin     Fentanyl Palpitations       Physical Exam:    /79   Pulse 80   Ht 5' 10\" (1.778 m)   Wt 70.8 kg (156 lb)   SpO2 94%   BMI 22.38 kg/m²     Constitutional:normal, well developed, well nourished, alert, in no distress and non-toxic and no overt pain behavior.  Eyes:anicteric  HEENT:grossly intact  Neck:symmetric, trachea midline and no masses   Pulmonary:even and unlabored  Cardiovascular:No edema or pitting edema present  Skin:Normal without rashes or lesions and well hydrated  Psychiatric:Mood and affect appropriate  Neurologic:Cranial Nerves II-XII grossly intact. Negative seated straight leg raise on the right.  Musculoskeletal:normal gait. 5/5 strength in all muscle groups of the bilateral lower extremities.    "

## 2024-02-28 ENCOUNTER — HOSPITAL ENCOUNTER (OUTPATIENT)
Dept: RADIOLOGY | Facility: IMAGING CENTER | Age: 74
Discharge: HOME | End: 2024-02-28

## 2024-02-28 DIAGNOSIS — S05.41XA FOREIGN BODY BEHIND THE EYE, BILATERAL: ICD-10-CM

## 2024-02-28 DIAGNOSIS — S05.42XA FOREIGN BODY BEHIND THE EYE, BILATERAL: ICD-10-CM

## 2024-03-01 ENCOUNTER — HOSPITAL ENCOUNTER (OUTPATIENT)
Dept: RADIOLOGY | Facility: IMAGING CENTER | Age: 74
End: 2024-03-01
Payer: MEDICARE

## 2024-03-01 DIAGNOSIS — M54.16 RIGHT LUMBAR RADICULITIS: ICD-10-CM

## 2024-03-01 PROCEDURE — 72148 MRI LUMBAR SPINE W/O DYE: CPT

## 2024-03-11 ENCOUNTER — TELEPHONE (OUTPATIENT)
Dept: PAIN MEDICINE | Facility: MEDICAL CENTER | Age: 74
End: 2024-03-11

## 2024-03-11 NOTE — TELEPHONE ENCOUNTER
----- Message from Maddy Scott PA-C sent at 3/10/2024  1:30 PM EDT -----  Please notify the patient that his MRI revealed degenerative changes of the lumbar spine at multiple levels. There is moderate bilateral foraminal narrowing at L5-S1, right greater than left, that may be contributing to his symptoms.     Last time I spoke to the patient, he was not in enough pain to schedule an injection, but if his pain has increased we can offer him RIGHT L5-S1 TFESI.

## 2024-03-13 NOTE — TELEPHONE ENCOUNTER
RN attempted to each pt he did not answer and the VM was full [facility] know we are trying to call him about his results and that his VM is full.

## 2024-03-18 NOTE — TELEPHONE ENCOUNTER
RN s/w pt regarding previous and is going to hold off on scheduling any injections right now due to feeling better since doing the exercises.  RN changed pt's home number to primary as per pt's request and pt will clear out VM on cell phone.   response to care/treatments:

## 2024-03-18 NOTE — TELEPHONE ENCOUNTER
Caller: Rodney    Doctor: Gregg    Reason for call: Pt is returning RN call for MRI results     Call back#: [phone]

## 2024-03-19 ENCOUNTER — APPOINTMENT (OUTPATIENT)
Dept: LAB | Facility: IMAGING CENTER | Age: 74
End: 2024-03-19
Payer: MEDICARE

## 2024-03-19 DIAGNOSIS — Z12.5 PROSTATE CANCER SCREENING: ICD-10-CM

## 2024-03-19 DIAGNOSIS — I10 ESSENTIAL HYPERTENSION: ICD-10-CM

## 2024-03-19 DIAGNOSIS — E78.2 MIXED HYPERLIPIDEMIA: ICD-10-CM

## 2024-03-19 LAB
ALBUMIN SERPL BCP-MCNC: 4.3 G/DL (ref 3.5–5)
ALP SERPL-CCNC: 57 U/L (ref 34–104)
ALT SERPL W P-5'-P-CCNC: 30 U/L (ref 7–52)
ANION GAP SERPL CALCULATED.3IONS-SCNC: 6 MMOL/L (ref 4–13)
AST SERPL W P-5'-P-CCNC: 31 U/L (ref 13–39)
BASOPHILS # BLD AUTO: 0.06 THOUSANDS/ÂΜL (ref 0–0.1)
BASOPHILS NFR BLD AUTO: 2 % (ref 0–1)
BILIRUB SERPL-MCNC: 0.52 MG/DL (ref 0.2–1)
BUN SERPL-MCNC: 15 MG/DL (ref 5–25)
CALCIUM SERPL-MCNC: 9.7 MG/DL (ref 8.4–10.2)
CHLORIDE SERPL-SCNC: 106 MMOL/L (ref 96–108)
CHOLEST SERPL-MCNC: 206 MG/DL
CO2 SERPL-SCNC: 30 MMOL/L (ref 21–32)
CREAT SERPL-MCNC: 0.86 MG/DL (ref 0.6–1.3)
EOSINOPHIL # BLD AUTO: 0.29 THOUSAND/ÂΜL (ref 0–0.61)
EOSINOPHIL NFR BLD AUTO: 7 % (ref 0–6)
ERYTHROCYTE [DISTWIDTH] IN BLOOD BY AUTOMATED COUNT: 12.4 % (ref 11.6–15.1)
GFR SERPL CREATININE-BSD FRML MDRD: 84 ML/MIN/1.73SQ M
GLUCOSE P FAST SERPL-MCNC: 83 MG/DL (ref 65–99)
HCT VFR BLD AUTO: 45 % (ref 36.5–49.3)
HDLC SERPL-MCNC: 72 MG/DL
HGB BLD-MCNC: 14 G/DL (ref 12–17)
IMM GRANULOCYTES # BLD AUTO: 0 THOUSAND/UL (ref 0–0.2)
IMM GRANULOCYTES NFR BLD AUTO: 0 % (ref 0–2)
LDLC SERPL CALC-MCNC: 110 MG/DL (ref 0–100)
LYMPHOCYTES # BLD AUTO: 1.24 THOUSANDS/ÂΜL (ref 0.6–4.47)
LYMPHOCYTES NFR BLD AUTO: 31 % (ref 14–44)
MCH RBC QN AUTO: 31.3 PG (ref 26.8–34.3)
MCHC RBC AUTO-ENTMCNC: 31.1 G/DL (ref 31.4–37.4)
MCV RBC AUTO: 101 FL (ref 82–98)
MONOCYTES # BLD AUTO: 0.52 THOUSAND/ÂΜL (ref 0.17–1.22)
MONOCYTES NFR BLD AUTO: 13 % (ref 4–12)
NEUTROPHILS # BLD AUTO: 1.95 THOUSANDS/ÂΜL (ref 1.85–7.62)
NEUTS SEG NFR BLD AUTO: 47 % (ref 43–75)
NRBC BLD AUTO-RTO: 0 /100 WBCS
PLATELET # BLD AUTO: 194 THOUSANDS/UL (ref 149–390)
PMV BLD AUTO: 11.7 FL (ref 8.9–12.7)
POTASSIUM SERPL-SCNC: 4.5 MMOL/L (ref 3.5–5.3)
PROT SERPL-MCNC: 6.8 G/DL (ref 6.4–8.4)
PSA SERPL-MCNC: 1.82 NG/ML (ref 0–4)
RBC # BLD AUTO: 4.47 MILLION/UL (ref 3.88–5.62)
SODIUM SERPL-SCNC: 142 MMOL/L (ref 135–147)
TRIGL SERPL-MCNC: 120 MG/DL
TSH SERPL DL<=0.05 MIU/L-ACNC: 1.62 UIU/ML (ref 0.45–4.5)
WBC # BLD AUTO: 4.06 THOUSAND/UL (ref 4.31–10.16)

## 2024-03-19 PROCEDURE — 80053 COMPREHEN METABOLIC PANEL: CPT

## 2024-03-19 PROCEDURE — 80061 LIPID PANEL: CPT

## 2024-03-19 PROCEDURE — 85025 COMPLETE CBC W/AUTO DIFF WBC: CPT

## 2024-03-19 PROCEDURE — G0103 PSA SCREENING: HCPCS

## 2024-03-19 PROCEDURE — 84443 ASSAY THYROID STIM HORMONE: CPT

## 2024-03-19 PROCEDURE — 36415 COLL VENOUS BLD VENIPUNCTURE: CPT

## 2024-03-29 ENCOUNTER — OFFICE VISIT (OUTPATIENT)
Dept: FAMILY MEDICINE CLINIC | Facility: CLINIC | Age: 74
End: 2024-03-29
Payer: MEDICARE

## 2024-03-29 VITALS
WEIGHT: 154 LBS | HEART RATE: 78 BPM | OXYGEN SATURATION: 97 % | BODY MASS INDEX: 22.05 KG/M2 | HEIGHT: 70 IN | DIASTOLIC BLOOD PRESSURE: 80 MMHG | SYSTOLIC BLOOD PRESSURE: 124 MMHG | TEMPERATURE: 97.3 F | RESPIRATION RATE: 16 BRPM

## 2024-03-29 DIAGNOSIS — E21.3 HYPERPARATHYROIDISM (HCC): ICD-10-CM

## 2024-03-29 DIAGNOSIS — F32.5 MAJOR DEPRESSIVE DISORDER, SINGLE EPISODE IN FULL REMISSION (HCC): ICD-10-CM

## 2024-03-29 DIAGNOSIS — I47.10 PSVT (PAROXYSMAL SUPRAVENTRICULAR TACHYCARDIA): ICD-10-CM

## 2024-03-29 DIAGNOSIS — R73.9 HYPERGLYCEMIA: ICD-10-CM

## 2024-03-29 DIAGNOSIS — E44.1 MILD PROTEIN-CALORIE MALNUTRITION (HCC): ICD-10-CM

## 2024-03-29 DIAGNOSIS — I10 ESSENTIAL HYPERTENSION: ICD-10-CM

## 2024-03-29 DIAGNOSIS — E78.2 MIXED HYPERLIPIDEMIA: Primary | ICD-10-CM

## 2024-03-29 DIAGNOSIS — J41.0 SIMPLE CHRONIC BRONCHITIS (HCC): ICD-10-CM

## 2024-03-29 PROCEDURE — 99214 OFFICE O/P EST MOD 30 MIN: CPT | Performed by: FAMILY MEDICINE

## 2024-03-29 PROCEDURE — G2211 COMPLEX E/M VISIT ADD ON: HCPCS | Performed by: FAMILY MEDICINE

## 2024-03-29 NOTE — ASSESSMENT & PLAN NOTE
Not well controlled on atorvastatin 40 mg daily.  His LDL was slightly elevated.  Patient to continue with the same.  It was discussed about low-fat diet and regular exercise.  We will continue to monitor fasting lipid profile. In 6 months, patient to complete repeat labs and return for follow up visit.

## 2024-03-29 NOTE — ASSESSMENT & PLAN NOTE
Malnutrition Findings:                       improved.  Continue increase protein in your diet.  Continue to monitor.           BMI Findings:           Body mass index is 22.1 kg/m².

## 2024-03-29 NOTE — ASSESSMENT & PLAN NOTE
/80 in office today. Well controlled without medications. Continue to monitor. In 6 months, patient to complete repeat labs and return for follow up visit.

## 2024-03-29 NOTE — PROGRESS NOTES
Name: Rodney Maldonado      : 1950      MRN: [id number]  Encounter Provider: Misty Vicente MD  Encounter Date: 3/29/2024   Encounter department: [facility]  PRIMARY CARE    Assessment & Plan     1. Mixed hyperlipidemia  Assessment & Plan:  Not well controlled on atorvastatin 40 mg daily.  His LDL was slightly elevated.  Patient to continue with the same.  It was discussed about low-fat diet and regular exercise.  We will continue to monitor fasting lipid profile. In 6 months, patient to complete repeat labs and return for follow up visit.     Orders:  -     CBC and differential; Future  -     Comprehensive metabolic panel; Future  -     Lipid Panel with Direct LDL reflex; Future  -     TSH, 3rd generation with Free T4 reflex; Future    2. Hyperglycemia  Assessment & Plan:  Well controlled per last A1C and fasting glucose. Patient advised to continue with low carb diet and regular exercise. We will continue to monitor. In 6 months, patient to complete repeat labs and return for follow up visit.     Orders:  -     Hemoglobin A1C; Future    3. Essential hypertension  Assessment & Plan:  /80 in office today. Well controlled without medications. Continue to monitor. In 6 months, patient to complete repeat labs and return for follow up visit.     Orders:  -     CBC and differential; Future  -     Comprehensive metabolic panel; Future  -     Lipid Panel with Direct LDL reflex; Future  -     TSH, 3rd generation with Free T4 reflex; Future    4. Mild protein-calorie malnutrition (HCC)  Assessment & Plan:  Malnutrition Findings:                       improved.  Continue increase protein in your diet.  Continue to monitor.           BMI Findings:           Body mass index is 22.1 kg/m².         5. Simple chronic bronchitis (HCC)  Assessment & Plan:  Continue on albuterol as needed.      6. Major depressive disorder, single episode in full remission (HCC)  Assessment & Plan:  Stable.  Continue same.   Will continue to monitor.      7. PSVT (paroxysmal supraventricular tachycardia)  Assessment & Plan:  Stable.  Asymptomatic.  Continue same.  Continue follow-up with cardiology.      8. Hyperparathyroidism (HCC)  Assessment & Plan:  Well controlled, PTH is normal. Continue to monitor.                  Subjective     Is here today for follow-up multiple medical problems.  He has been taking his medications.  He denies any side effect from his medications.  He had his levels every 6-week back showed cholesterol slightly elevated.  All the rest of blood work came back stable.      Review of Systems   Constitutional:  Negative for chills and fever.   HENT:  Negative for trouble swallowing.    Eyes:  Negative for visual disturbance.   Respiratory:  Negative for cough and shortness of breath.    Cardiovascular:  Negative for chest pain and palpitations.   Gastrointestinal:  Negative for abdominal pain, blood in stool and vomiting.   Endocrine: Negative for cold intolerance and heat intolerance.   Genitourinary:  Negative for difficulty urinating and dysuria.   Musculoskeletal:  Negative for gait problem.   Skin:  Negative for rash.   Neurological:  Negative for dizziness, syncope and headaches.   Hematological:  Negative for adenopathy.   Psychiatric/Behavioral:  Negative for behavioral problems.        Past Medical History:   Diagnosis Date   • Anxiety    • Arthritis    • Depression    • GERD (gastroesophageal reflux disease)    • Gout    • Hyperlipidemia    • Hypertension    • Kidney stone    • Psoriatic arthritis (HCC)    • PTSD (post-traumatic stress disorder)    • SVT (supraventricular tachycardia)    • Thyroid tumor, benign      Past Surgical History:   Procedure Laterality Date   • CARDIAC ELECTROPHYSIOLOGY MAPPING AND ABLATION      SVT   • PARATHYROIDECTOMY     • PROSTATE SURGERY       Family History   Problem Relation Age of Onset   • Coronary artery disease Brother    • Hypertension Mother    • No Known Problems  Father      Social History     Socioeconomic History   • Marital status:      Spouse name: None   • Number of children: None   • Years of education: None   • Highest education level: None   Occupational History   • None   Tobacco Use   • Smoking status: Never   • Smokeless tobacco: Never   Vaping Use   • Vaping status: Never Used   Substance and Sexual Activity   • Alcohol use: No   • Drug use: No   • Sexual activity: Not Currently   Other Topics Concern   • None   Social History Narrative   • None     Social Determinants of Health     Financial Resource Strain: Low Risk  (9/29/2023)    Overall Financial Resource Strain (CARDIA)    • Difficulty of Paying Living Expenses: Not very hard   Food Insecurity: Not on file   Transportation Needs: No Transportation Needs (9/29/2023)    PRAPARE - Transportation    • Lack of Transportation (Medical): No    • Lack of Transportation (Non-Medical): No   Physical Activity: Not on file   Stress: Not on file   Social Connections: Not on file   Intimate Partner Violence: Not on file   Housing Stability: Not on file     Current Outpatient Medications on File Prior to Visit   Medication Sig   • albuterol (PROVENTIL HFA,VENTOLIN HFA) 90 mcg/act inhaler Inhale 2 puffs every 6 (six) hours as needed for wheezing or shortness of breath   • atorvastatin (LIPITOR) 40 mg tablet Take 40 mg by mouth    • azelastine (ASTELIN) 0.1 % nasal spray 1 spray into each nostril 2 (two) times a day Use in each nostril as directed   • Carboxymethylcellulose Sod PF 0.25 % SOLN INSTILL ONE DROP BOTH EYES FOUR TIMES A DAY FOR DRY EYES   • Cholecalciferol 2000 units CAPS Take 2,000 Units by mouth   • dextran 70-hypromellose (GENTEAL TEARS) 0.1-0.3 % ophthalmic solution Apply 1 drop to eye   • ketotifen (ZADITOR) 0.025 % ophthalmic solution INSTILL ONE DROP BOTH EYES TWICE A DAY   • leflunomide (ARAVA) 20 MG tablet Take 1 tablet (20 mg total) by mouth daily   • methocarbamol (ROBAXIN) 500 mg tablet Take  1 tablet (500 mg total) by mouth daily at bedtime   • mirtazapine (REMERON) 30 mg tablet    • polyvinyl alcohol (LIQUIFILM TEARS) 1.4 % ophthalmic solution Apply 1 drop to eye   • sertraline (ZOLOFT) 100 mg tablet Take 100 mg by mouth daily    • sulfaSALAzine (AZULFIDINE) 500 mg tablet Take 2 tabs twice a day   • ofloxacin (OCUFLOX) 0.3 % ophthalmic solution INSTILL 1 DROP INTO RIGHT EYE FOUR TIMES A DAY FOR EYE INFECTION USE AS DIRECTED (Patient not taking: Reported on 9/29/2023)     Allergies   Allergen Reactions   • Cefadroxil Other (See Comments)     bleeding internally   • Midazolam Other (See Comments)     ARRHYTHMIA   • Prazosin    • Fentanyl Palpitations     Immunization History   Administered Date(s) Administered   • COVID-19 MODERNA VACC 0.5 ML IM 02/04/2021, 03/04/2021, 11/24/2021   • COVID-19, unspecified 02/04/2021, 03/04/2021   • INFLUENZA 11/27/2006, 11/05/2007, 10/20/2008, 09/22/2009, 10/29/2010, 10/26/2011, 10/09/2012, 02/05/2013, 09/30/2013, 10/15/2014, 10/20/2016, 10/01/2018, 10/21/2019, 10/15/2020, 09/29/2023   • Influenza Quadrivalent Preservative Free 3 years and older IM 10/01/2018, 10/21/2019, 10/15/2020   • Influenza, Seasonal Vaccine, Quadrivalent, Adjuvanted, .5e 09/30/2022   • Influenza, high dose seasonal 0.7 mL 09/20/2021, 09/29/2023   • Influenza, injectable, quadrivalent, preservative free 0.5 mL 10/01/2018, 10/21/2019, 10/15/2020   • Influenza, seasonal, injectable, preservative free 10/20/2015, 10/19/2016, 10/11/2017   • Pneumococcal 11/05/2007, 11/03/2015   • Pneumococcal Conjugate 13-Valent 01/19/2016   • Pneumococcal Conjugate PCV 7 11/03/2015   • Pneumococcal Polysaccharide PPV23 09/05/2017   • Td (adult), Unspecified 09/18/2008   • Tdap 06/02/2014, 05/31/2020   • Zoster 11/03/2013, 03/31/2015   • Zoster Vaccine Recombinant 08/07/2020, 03/21/2023       Objective     /80 (BP Location: Left arm, Patient Position: Sitting, Cuff Size: Adult)   Pulse 78   Temp (!) 97.3 °F  "(36.3 °C) (Tympanic)   Resp 16   Ht 5' 10\" (1.778 m)   Wt 69.9 kg (154 lb)   SpO2 97%   BMI 22.10 kg/m²     Physical Exam  Vitals and nursing note reviewed.   Constitutional:       Appearance: He is well-developed.   HENT:      Head: Normocephalic and atraumatic.   Eyes:      Pupils: Pupils are equal, round, and reactive to light.   Cardiovascular:      Rate and Rhythm: Normal rate and regular rhythm.      Heart sounds: Normal heart sounds.   Pulmonary:      Effort: Pulmonary effort is normal.      Breath sounds: Normal breath sounds.   Abdominal:      General: Bowel sounds are normal.      Palpations: Abdomen is soft.   Musculoskeletal:      Cervical back: Normal range of motion and neck supple.   Lymphadenopathy:      Cervical: No cervical adenopathy.   Skin:     General: Skin is warm.      Findings: No rash.   Neurological:      Mental Status: He is alert and oriented to person, place, and time.     Depression Screening Follow-up Plan: Patient's depression screening was positive with a PHQ-2 score of . Their PHQ-9 score was 0. Patient assessed for underlying major depression. They have no active suicidal ideations. Brief counseling provided and recommend additional follow-up/re-evaluation next office visit.  Misty Vicente MD    "

## 2024-04-30 ENCOUNTER — TELEPHONE (OUTPATIENT)
Dept: FAMILY MEDICINE CLINIC | Facility: CLINIC | Age: 74
End: 2024-04-30

## 2024-04-30 NOTE — TELEPHONE ENCOUNTER
Pt came into office to request ov notes and med list from last visit with Dr. Smiley. Gave them to him.

## 2024-09-17 ENCOUNTER — APPOINTMENT (OUTPATIENT)
Dept: LAB | Age: 74
End: 2024-09-17
Payer: MEDICARE

## 2024-09-17 DIAGNOSIS — R73.9 HYPERGLYCEMIA: ICD-10-CM

## 2024-09-17 DIAGNOSIS — E78.2 MIXED HYPERLIPIDEMIA: ICD-10-CM

## 2024-09-17 DIAGNOSIS — I10 ESSENTIAL HYPERTENSION: ICD-10-CM

## 2024-09-17 LAB
ALBUMIN SERPL BCG-MCNC: 4.2 G/DL (ref 3.5–5)
ALP SERPL-CCNC: 63 U/L (ref 34–104)
ALT SERPL W P-5'-P-CCNC: 33 U/L (ref 7–52)
ANION GAP SERPL CALCULATED.3IONS-SCNC: 7 MMOL/L (ref 4–13)
AST SERPL W P-5'-P-CCNC: 34 U/L (ref 13–39)
BASOPHILS # BLD AUTO: 0.07 THOUSANDS/ΜL (ref 0–0.1)
BASOPHILS NFR BLD AUTO: 1 % (ref 0–1)
BILIRUB SERPL-MCNC: 0.46 MG/DL (ref 0.2–1)
BUN SERPL-MCNC: 15 MG/DL (ref 5–25)
CALCIUM SERPL-MCNC: 10 MG/DL (ref 8.4–10.2)
CHLORIDE SERPL-SCNC: 105 MMOL/L (ref 96–108)
CHOLEST SERPL-MCNC: 235 MG/DL
CO2 SERPL-SCNC: 30 MMOL/L (ref 21–32)
CREAT SERPL-MCNC: 0.89 MG/DL (ref 0.6–1.3)
EOSINOPHIL # BLD AUTO: 0.53 THOUSAND/ΜL (ref 0–0.61)
EOSINOPHIL NFR BLD AUTO: 9 % (ref 0–6)
ERYTHROCYTE [DISTWIDTH] IN BLOOD BY AUTOMATED COUNT: 12.3 % (ref 11.6–15.1)
EST. AVERAGE GLUCOSE BLD GHB EST-MCNC: 94 MG/DL
GFR SERPL CREATININE-BSD FRML MDRD: 83 ML/MIN/1.73SQ M
GLUCOSE P FAST SERPL-MCNC: 102 MG/DL (ref 65–99)
HBA1C MFR BLD: 4.9 %
HCT VFR BLD AUTO: 44.8 % (ref 36.5–49.3)
HDLC SERPL-MCNC: 66 MG/DL
HGB BLD-MCNC: 14.1 G/DL (ref 12–17)
IMM GRANULOCYTES # BLD AUTO: 0.02 THOUSAND/UL (ref 0–0.2)
IMM GRANULOCYTES NFR BLD AUTO: 0 % (ref 0–2)
LDLC SERPL CALC-MCNC: 134 MG/DL (ref 0–100)
LYMPHOCYTES # BLD AUTO: 1.72 THOUSANDS/ΜL (ref 0.6–4.47)
LYMPHOCYTES NFR BLD AUTO: 30 % (ref 14–44)
MCH RBC QN AUTO: 31.1 PG (ref 26.8–34.3)
MCHC RBC AUTO-ENTMCNC: 31.5 G/DL (ref 31.4–37.4)
MCV RBC AUTO: 99 FL (ref 82–98)
MONOCYTES # BLD AUTO: 0.76 THOUSAND/ΜL (ref 0.17–1.22)
MONOCYTES NFR BLD AUTO: 13 % (ref 4–12)
NEUTROPHILS # BLD AUTO: 2.56 THOUSANDS/ΜL (ref 1.85–7.62)
NEUTS SEG NFR BLD AUTO: 47 % (ref 43–75)
NRBC BLD AUTO-RTO: 0 /100 WBCS
PLATELET # BLD AUTO: 192 THOUSANDS/UL (ref 149–390)
PMV BLD AUTO: 11.9 FL (ref 8.9–12.7)
POTASSIUM SERPL-SCNC: 3.9 MMOL/L (ref 3.5–5.3)
PROT SERPL-MCNC: 6.9 G/DL (ref 6.4–8.4)
RBC # BLD AUTO: 4.53 MILLION/UL (ref 3.88–5.62)
SODIUM SERPL-SCNC: 142 MMOL/L (ref 135–147)
TRIGL SERPL-MCNC: 173 MG/DL
TSH SERPL DL<=0.05 MIU/L-ACNC: 2.61 UIU/ML (ref 0.45–4.5)
WBC # BLD AUTO: 5.66 THOUSAND/UL (ref 4.31–10.16)

## 2024-09-17 PROCEDURE — 84443 ASSAY THYROID STIM HORMONE: CPT

## 2024-09-17 PROCEDURE — 85025 COMPLETE CBC W/AUTO DIFF WBC: CPT

## 2024-09-17 PROCEDURE — 80053 COMPREHEN METABOLIC PANEL: CPT

## 2024-09-17 PROCEDURE — 80061 LIPID PANEL: CPT

## 2024-09-17 PROCEDURE — 83036 HEMOGLOBIN GLYCOSYLATED A1C: CPT

## 2024-09-17 PROCEDURE — 36415 COLL VENOUS BLD VENIPUNCTURE: CPT

## 2024-09-30 ENCOUNTER — OFFICE VISIT (OUTPATIENT)
Dept: FAMILY MEDICINE CLINIC | Facility: CLINIC | Age: 74
End: 2024-09-30
Payer: MEDICARE

## 2024-09-30 VITALS
SYSTOLIC BLOOD PRESSURE: 124 MMHG | DIASTOLIC BLOOD PRESSURE: 72 MMHG | OXYGEN SATURATION: 97 % | WEIGHT: 152 LBS | HEIGHT: 70 IN | RESPIRATION RATE: 16 BRPM | TEMPERATURE: 97 F | HEART RATE: 63 BPM | BODY MASS INDEX: 21.76 KG/M2

## 2024-09-30 DIAGNOSIS — I47.10 PSVT (PAROXYSMAL SUPRAVENTRICULAR TACHYCARDIA) (HCC): ICD-10-CM

## 2024-09-30 DIAGNOSIS — Z12.5 PROSTATE CANCER SCREENING: ICD-10-CM

## 2024-09-30 DIAGNOSIS — Z23 ENCOUNTER FOR IMMUNIZATION: ICD-10-CM

## 2024-09-30 DIAGNOSIS — F32.5 MAJOR DEPRESSIVE DISORDER, SINGLE EPISODE IN FULL REMISSION (HCC): ICD-10-CM

## 2024-09-30 DIAGNOSIS — R73.9 HYPERGLYCEMIA: ICD-10-CM

## 2024-09-30 DIAGNOSIS — I10 ESSENTIAL HYPERTENSION: ICD-10-CM

## 2024-09-30 DIAGNOSIS — J42 CHRONIC BRONCHITIS, UNSPECIFIED CHRONIC BRONCHITIS TYPE (HCC): ICD-10-CM

## 2024-09-30 DIAGNOSIS — E78.2 MIXED HYPERLIPIDEMIA: Primary | ICD-10-CM

## 2024-09-30 DIAGNOSIS — Z00.00 MEDICARE ANNUAL WELLNESS VISIT, SUBSEQUENT: ICD-10-CM

## 2024-09-30 PROCEDURE — G0008 ADMIN INFLUENZA VIRUS VAC: HCPCS

## 2024-09-30 PROCEDURE — G0439 PPPS, SUBSEQ VISIT: HCPCS | Performed by: FAMILY MEDICINE

## 2024-09-30 PROCEDURE — 90662 IIV NO PRSV INCREASED AG IM: CPT

## 2024-09-30 PROCEDURE — 99214 OFFICE O/P EST MOD 30 MIN: CPT | Performed by: FAMILY MEDICINE

## 2024-09-30 RX ORDER — METOPROLOL SUCCINATE 50 MG/1
50 TABLET, EXTENDED RELEASE ORAL DAILY
COMMUNITY
Start: 2024-06-21 | End: 2025-06-21

## 2024-09-30 NOTE — PROGRESS NOTES
Ambulatory Visit  Name: Rodney Maldonado      : 1950      MRN: [id number]  Encounter Provider: Misty Vicente MD  Encounter Date: 2024   [facility]    Assessment & Plan  Hyperglycemia  Elevated fasting glucose but A1c is normal.  Continue to follow low-carb diet.  Continue to monitor fasting glucose and A1c.    Orders:    Hemoglobin A1C; Future    Mixed hyperlipidemia  Elevated cholesterol.  Discussed about low-fat diet.  He was told to use his atorvastatin daily as prescribed.  Continue to monitor fasting lipid profile.    Orders:    CBC and differential; Future    Comprehensive metabolic panel; Future    Lipid Panel with Direct LDL reflex; Future    TSH, 3rd generation with Free T4 reflex; Future    Essential hypertension  Well-controlled on metoprolol.  Continue same.  Will continue to monitor.     Orders:    CBC and differential; Future    Comprehensive metabolic panel; Future    Lipid Panel with Direct LDL reflex; Future    TSH, 3rd generation with Free T4 reflex; Future    Prostate cancer screening    Orders:    PSA, Total Screen; Future    Encounter for immunization    Orders:    influenza vaccine, high-dose, PF 0.5 mL (Fluzone High Dose)    PSVT (paroxysmal supraventricular tachycardia) (HCC)  Stable.  Asymptomatic.  Continue same.  Continue follow-up with cardiology.         Chronic bronchitis, unspecified chronic bronchitis type (HCC)  Continue on albuterol as needed.         Major depressive disorder, single episode in full remission (HCC)  Stable.  Continue same.  Will continue to monitor.         Medicare annual wellness visit, subsequent  It was discussed about immunizations, diet, exercise and safety measures.            Preventive health issues were discussed with patient, and age appropriate screening tests were ordered as noted in patient's After Visit Summary. Personalized health advice and appropriate referrals for health education or  preventive services given if needed, as noted in patient's After Visit Summary.    History of Present Illness     Is here today for follow-up multiple medical problems.  He has been taking his medications.  He denies any side effect from his medications.  He had his blood work done recently showed elevated cholesterol and slightly elevated fasting glucose but A1c is normal.  He denies any other complaint.       Patient Care Team:  Misty Vicente MD as PCP - General (Family Medicine)    Review of Systems   Constitutional:  Negative for chills and fever.   HENT:  Negative for trouble swallowing.    Eyes:  Negative for visual disturbance.   Respiratory:  Negative for cough and shortness of breath.    Cardiovascular:  Negative for chest pain and palpitations.   Gastrointestinal:  Negative for abdominal pain, blood in stool and vomiting.   Endocrine: Negative for cold intolerance and heat intolerance.   Genitourinary:  Negative for difficulty urinating and dysuria.   Musculoskeletal:  Negative for gait problem.   Skin:  Negative for rash.   Neurological:  Negative for dizziness, syncope and headaches.   Hematological:  Negative for adenopathy.   Psychiatric/Behavioral:  Negative for behavioral problems.      Medical History Reviewed by provider this encounter:  Tobacco  Allergies  Meds  Problems  Med Hx  Surg Hx  Fam Hx       Annual Wellness Visit Questionnaire   Rodney is here for his Subsequent Wellness visit.     Health Risk Assessment:   Patient rates overall health as very good. Patient feels that their physical health rating is same. Patient is satisfied with their life. Eyesight was rated as same. Hearing was rated as same. Patient feels that their emotional and mental health rating is same. Patients states they are sometimes angry. Patient states they are sometimes unusually tired/fatigued. Pain experienced in the last 7 days has been none. Patient states that he has experienced no weight loss or gain  in last 6 months.     Depression Screening:   PHQ-9 Score: 1      Fall Risk Screening:   In the past year, patient has experienced: no history of falling in past year      Home Safety:  Patient does not have trouble with stairs inside or outside of their home. Patient has working smoke alarms and has no working carbon monoxide detector. Home safety hazards include: none.     Nutrition:   Current diet is Regular.     Medications:   Patient is not currently taking any over-the-counter supplements. Patient is able to manage medications.     Activities of Daily Living (ADLs)/Instrumental Activities of Daily Living (IADLs):   Walk and transfer into and out of bed and chair?: Yes  Dress and groom yourself?: Yes    Bathe or shower yourself?: Yes    Feed yourself? Yes  Do your laundry/housekeeping?: Yes  Manage your money, pay your bills and track your expenses?: Yes  Make your own meals?: Yes    Do your own shopping?: Yes    Previous Hospitalizations:   Any hospitalizations or ED visits within the last 12 months?: No      Advance Care Planning:   Living will: Yes    Durable POA for healthcare: Yes    Advanced directive: Yes      Cognitive Screening:   Provider or family/friend/caregiver concerned regarding cognition?: No    PREVENTIVE SCREENINGS      Cardiovascular Screening:    General: Screening Not Indicated and History Lipid Disorder      Diabetes Screening:     General: Screening Current      Colorectal Cancer Screening:     General: Screening Current      Prostate Cancer Screening:    General: Screening Not Indicated      Osteoporosis Screening:    General: Risks and Benefits Discussed      Abdominal Aortic Aneurysm (AAA) Screening:        General: Risks and Benefits Discussed      Lung Cancer Screening:     General: Screening Not Indicated      Hepatitis C Screening:    General: Screening Current    Screening, Brief Intervention, and Referral to Treatment (SBIRT)    Screening  Typical number of drinks in a day:  "0  Typical number of drinks in a week: 0  Interpretation: Low risk drinking behavior.    AUDIT-C Screenin) How often did you have a drink containing alcohol in the past year? never  2) How many drinks did you have on a typical day when you were drinking in the past year? 0  3) How often did you have 6 or more drinks on one occasion in the past year? never    AUDIT-C Score: 0  Interpretation: Score 0-3 (male): Negative screen for alcohol misuse    Single Item Drug Screening:  How often have you used an illegal drug (including marijuana) or a prescription medication for non-medical reasons in the past year? never    Single Item Drug Screen Score: 0  Interpretation: Negative screen for possible drug use disorder    Brief Intervention  Alcohol & drug use screenings were reviewed. No concerns regarding substance use disorder identified.     Other Counseling Topics:   Calcium and vitamin D intake and regular weightbearing exercise.     Social Determinants of Health     Financial Resource Strain: Low Risk  (2023)    Overall Financial Resource Strain (CARDIA)     Difficulty of Paying Living Expenses: Not very hard   Transportation Needs: No Transportation Needs (2023)    PRAPARE - Transportation     Lack of Transportation (Medical): No     Lack of Transportation (Non-Medical): No   Housing Stability: Unknown (2024)    Housing Stability Vital Sign     Unable to Pay for Housing in the Last Year: No     Homeless in the Last Year: No   Utilities: Not At Risk (2024)    [facility] Utilities     Threatened with loss of utilities: No     No results found.    Objective     /72 (BP Location: Left arm, Patient Position: Sitting, Cuff Size: Adult)   Pulse 63   Temp (!) 97 °F (36.1 °C) (Tympanic)   Resp 16   Ht 5' 10\" (1.778 m)   Wt 68.9 kg (152 lb)   SpO2 97%   BMI 21.81 kg/m²     Physical Exam  Vitals and nursing note reviewed.   Constitutional:       Appearance: He is well-developed.   HENT:      Head: " Normocephalic and atraumatic.   Eyes:      Pupils: Pupils are equal, round, and reactive to light.   Cardiovascular:      Rate and Rhythm: Normal rate and regular rhythm.      Heart sounds: Normal heart sounds.   Pulmonary:      Effort: Pulmonary effort is normal.      Breath sounds: Normal breath sounds.   Abdominal:      General: Bowel sounds are normal.      Palpations: Abdomen is soft.   Musculoskeletal:      Cervical back: Normal range of motion and neck supple.   Lymphadenopathy:      Cervical: No cervical adenopathy.   Skin:     General: Skin is warm.      Findings: No rash.   Neurological:      Mental Status: He is alert and oriented to person, place, and time.

## 2024-10-03 NOTE — ASSESSMENT & PLAN NOTE
Elevated fasting glucose but A1c is normal.  Continue to follow low-carb diet.  Continue to monitor fasting glucose and A1c.    Orders:    Hemoglobin A1C; Future

## 2024-10-03 NOTE — ASSESSMENT & PLAN NOTE
Well-controlled on metoprolol.  Continue same.  Will continue to monitor.     Orders:    CBC and differential; Future    Comprehensive metabolic panel; Future    Lipid Panel with Direct LDL reflex; Future    TSH, 3rd generation with Free T4 reflex; Future

## 2024-10-03 NOTE — ASSESSMENT & PLAN NOTE
Elevated cholesterol.  Discussed about low-fat diet.  He was told to use his atorvastatin daily as prescribed.  Continue to monitor fasting lipid profile.    Orders:    CBC and differential; Future    Comprehensive metabolic panel; Future    Lipid Panel with Direct LDL reflex; Future    TSH, 3rd generation with Free T4 reflex; Future

## 2024-11-02 PROBLEM — Z00.00 MEDICARE ANNUAL WELLNESS VISIT, SUBSEQUENT: Status: RESOLVED | Noted: 2023-09-29 | Resolved: 2024-11-02

## 2025-03-11 ENCOUNTER — APPOINTMENT (OUTPATIENT)
Dept: LAB | Facility: IMAGING CENTER | Age: 75
End: 2025-03-11
Payer: MEDICARE

## 2025-03-11 ENCOUNTER — APPOINTMENT (OUTPATIENT)
Dept: LAB | Age: 75
End: 2025-03-11
Payer: MEDICARE

## 2025-03-11 DIAGNOSIS — Z12.5 PROSTATE CANCER SCREENING: ICD-10-CM

## 2025-03-11 DIAGNOSIS — I10 ESSENTIAL HYPERTENSION: ICD-10-CM

## 2025-03-11 DIAGNOSIS — R73.9 HYPERGLYCEMIA: ICD-10-CM

## 2025-03-11 DIAGNOSIS — E78.2 MIXED HYPERLIPIDEMIA: ICD-10-CM

## 2025-03-11 LAB
ALBUMIN SERPL BCG-MCNC: 4.4 G/DL (ref 3.5–5)
ALP SERPL-CCNC: 73 U/L (ref 34–104)
ALT SERPL W P-5'-P-CCNC: 22 U/L (ref 7–52)
ANION GAP SERPL CALCULATED.3IONS-SCNC: 7 MMOL/L (ref 4–13)
AST SERPL W P-5'-P-CCNC: 26 U/L (ref 13–39)
BASOPHILS # BLD AUTO: 0.06 THOUSANDS/ÂΜL (ref 0–0.1)
BASOPHILS NFR BLD AUTO: 1 % (ref 0–1)
BILIRUB SERPL-MCNC: 0.42 MG/DL (ref 0.2–1)
BUN SERPL-MCNC: 15 MG/DL (ref 5–25)
CALCIUM SERPL-MCNC: 9.9 MG/DL (ref 8.4–10.2)
CHLORIDE SERPL-SCNC: 103 MMOL/L (ref 96–108)
CHOLEST SERPL-MCNC: 174 MG/DL (ref ?–200)
CO2 SERPL-SCNC: 31 MMOL/L (ref 21–32)
CREAT SERPL-MCNC: 0.87 MG/DL (ref 0.6–1.3)
EOSINOPHIL # BLD AUTO: 0.39 THOUSAND/ÂΜL (ref 0–0.61)
EOSINOPHIL NFR BLD AUTO: 8 % (ref 0–6)
ERYTHROCYTE [DISTWIDTH] IN BLOOD BY AUTOMATED COUNT: 12.1 % (ref 11.6–15.1)
EST. AVERAGE GLUCOSE BLD GHB EST-MCNC: 114 MG/DL
GFR SERPL CREATININE-BSD FRML MDRD: 83 ML/MIN/1.73SQ M
GLUCOSE P FAST SERPL-MCNC: 105 MG/DL (ref 65–99)
HBA1C MFR BLD: 5.6 %
HCT VFR BLD AUTO: 47.5 % (ref 36.5–49.3)
HDLC SERPL-MCNC: 65 MG/DL
HGB BLD-MCNC: 14.7 G/DL (ref 12–17)
IMM GRANULOCYTES # BLD AUTO: 0.01 THOUSAND/UL (ref 0–0.2)
IMM GRANULOCYTES NFR BLD AUTO: 0 % (ref 0–2)
LDLC SERPL CALC-MCNC: 82 MG/DL (ref 0–100)
LYMPHOCYTES # BLD AUTO: 1.59 THOUSANDS/ÂΜL (ref 0.6–4.47)
LYMPHOCYTES NFR BLD AUTO: 31 % (ref 14–44)
MCH RBC QN AUTO: 31.1 PG (ref 26.8–34.3)
MCHC RBC AUTO-ENTMCNC: 30.9 G/DL (ref 31.4–37.4)
MCV RBC AUTO: 101 FL (ref 82–98)
MONOCYTES # BLD AUTO: 0.68 THOUSAND/ÂΜL (ref 0.17–1.22)
MONOCYTES NFR BLD AUTO: 13 % (ref 4–12)
NEUTROPHILS # BLD AUTO: 2.34 THOUSANDS/ÂΜL (ref 1.85–7.62)
NEUTS SEG NFR BLD AUTO: 47 % (ref 43–75)
NRBC BLD AUTO-RTO: 0 /100 WBCS
PLATELET # BLD AUTO: 188 THOUSANDS/UL (ref 149–390)
PMV BLD AUTO: 12.4 FL (ref 8.9–12.7)
POTASSIUM SERPL-SCNC: 4.1 MMOL/L (ref 3.5–5.3)
PROT SERPL-MCNC: 6.9 G/DL (ref 6.4–8.4)
PSA SERPL-MCNC: 2.1 NG/ML (ref 0–4)
RBC # BLD AUTO: 4.72 MILLION/UL (ref 3.88–5.62)
SODIUM SERPL-SCNC: 141 MMOL/L (ref 135–147)
TRIGL SERPL-MCNC: 136 MG/DL (ref ?–150)
TSH SERPL DL<=0.05 MIU/L-ACNC: 2.46 UIU/ML (ref 0.45–4.5)
WBC # BLD AUTO: 5.07 THOUSAND/UL (ref 4.31–10.16)

## 2025-03-11 PROCEDURE — G0103 PSA SCREENING: HCPCS

## 2025-03-11 PROCEDURE — 36415 COLL VENOUS BLD VENIPUNCTURE: CPT

## 2025-03-11 PROCEDURE — 85025 COMPLETE CBC W/AUTO DIFF WBC: CPT

## 2025-03-11 PROCEDURE — 84443 ASSAY THYROID STIM HORMONE: CPT

## 2025-03-11 PROCEDURE — 80061 LIPID PANEL: CPT

## 2025-03-11 PROCEDURE — 80053 COMPREHEN METABOLIC PANEL: CPT

## 2025-03-11 PROCEDURE — 83036 HEMOGLOBIN GLYCOSYLATED A1C: CPT

## 2025-03-19 ENCOUNTER — RESULTS FOLLOW-UP (OUTPATIENT)
Dept: FAMILY MEDICINE CLINIC | Facility: CLINIC | Age: 75
End: 2025-03-19

## 2025-03-31 ENCOUNTER — OFFICE VISIT (OUTPATIENT)
Dept: FAMILY MEDICINE CLINIC | Facility: CLINIC | Age: 75
End: 2025-03-31
Payer: MEDICARE

## 2025-03-31 VITALS
BODY MASS INDEX: 21.62 KG/M2 | OXYGEN SATURATION: 97 % | DIASTOLIC BLOOD PRESSURE: 70 MMHG | HEIGHT: 70 IN | RESPIRATION RATE: 16 BRPM | TEMPERATURE: 97.2 F | WEIGHT: 151 LBS | SYSTOLIC BLOOD PRESSURE: 118 MMHG | HEART RATE: 78 BPM

## 2025-03-31 DIAGNOSIS — E21.3 HYPERPARATHYROIDISM (HCC): ICD-10-CM

## 2025-03-31 DIAGNOSIS — I47.10 PSVT (PAROXYSMAL SUPRAVENTRICULAR TACHYCARDIA) (HCC): ICD-10-CM

## 2025-03-31 DIAGNOSIS — F32.5 MAJOR DEPRESSIVE DISORDER, SINGLE EPISODE IN FULL REMISSION (HCC): ICD-10-CM

## 2025-03-31 DIAGNOSIS — L40.50 PSORIATIC ARTHRITIS (HCC): Primary | ICD-10-CM

## 2025-03-31 DIAGNOSIS — E78.2 MIXED HYPERLIPIDEMIA: ICD-10-CM

## 2025-03-31 DIAGNOSIS — J42 CHRONIC BRONCHITIS, UNSPECIFIED CHRONIC BRONCHITIS TYPE (HCC): ICD-10-CM

## 2025-03-31 DIAGNOSIS — I10 ESSENTIAL HYPERTENSION: ICD-10-CM

## 2025-03-31 DIAGNOSIS — R73.9 HYPERGLYCEMIA: ICD-10-CM

## 2025-03-31 PROBLEM — L40.9 PSORIASIS: Status: RESOLVED | Noted: 2020-08-09 | Resolved: 2025-03-31

## 2025-03-31 PROCEDURE — 99214 OFFICE O/P EST MOD 30 MIN: CPT | Performed by: FAMILY MEDICINE

## 2025-03-31 PROCEDURE — G2211 COMPLEX E/M VISIT ADD ON: HCPCS | Performed by: FAMILY MEDICINE

## 2025-03-31 NOTE — ASSESSMENT & PLAN NOTE
Improved. Continue atorvastatin 40 mg daily.  Continue to monitor fasting lipid profile.  Orders:  •  CBC and differential; Future  •  Comprehensive metabolic panel; Future  •  Lipid Panel with Direct LDL reflex; Future  •  TSH, 3rd generation with Free T4 reflex; Future

## 2025-03-31 NOTE — ASSESSMENT & PLAN NOTE
Well-controlled on metoprolol.  Continue same.  Will continue to monitor.     Orders:  •  CBC and differential; Future  •  Comprehensive metabolic panel; Future  •  Lipid Panel with Direct LDL reflex; Future  •  TSH, 3rd generation with Free T4 reflex; Future

## 2025-03-31 NOTE — ASSESSMENT & PLAN NOTE
Elevated fasting glucose but A1c is normal.  Continue to follow low-carb diet.  Continue to monitor fasting glucose and A1c.    Orders:  •  Hemoglobin A1C; Future

## 2025-03-31 NOTE — PROGRESS NOTES
Name: Rodney Maldonado      : 1950      MRN: [id number]  Encounter Provider: Misty Vicente MD  Encounter Date: 3/31/2025   [facility]  :  Assessment & Plan  Psoriatic arthritis (HCC)  Fair control. Continue follow-up with rheumatologist.        Chronic bronchitis, unspecified chronic bronchitis type (HCC)  Continue on albuterol as needed.                Essential hypertension  Well-controlled on metoprolol.  Continue same.  Will continue to monitor.     Orders:  •  CBC and differential; Future  •  Comprehensive metabolic panel; Future  •  Lipid Panel with Direct LDL reflex; Future  •  TSH, 3rd generation with Free T4 reflex; Future    PSVT (paroxysmal supraventricular tachycardia) (HCC)  Stable.  Asymptomatic.  Continue same.  Continue follow-up with cardiology.         Hyperparathyroidism (HCC)  Well controlled, PTH is normal. Continue to monitor.       Orders:  •  PTH, intact; Future    Hyperglycemia  Elevated fasting glucose but A1c is normal.  Continue to follow low-carb diet.  Continue to monitor fasting glucose and A1c.    Orders:  •  Hemoglobin A1C; Future    Mixed hyperlipidemia    Improved. Continue atorvastatin 40 mg daily.  Continue to monitor fasting lipid profile.  Orders:  •  CBC and differential; Future  •  Comprehensive metabolic panel; Future  •  Lipid Panel with Direct LDL reflex; Future  •  TSH, 3rd generation with Free T4 reflex; Future    Major depressive disorder, single episode in full remission (HCC)  Stable.  Continue same.  Will continue to monitor.                History of Present Illness   Pt is 75 y/o w PMH of HTN, HLD, MDD, and PSVT presenting today for 6 month follow-up. He is feeling well overall. No active complaints. He reports taking all medications as prescribed and denies any side effects. His blood pressure in the office today is well-controlled and recent lab work showed improved LDL and slightly elevated fasting  "glucose.       Review of Systems   Constitutional:  Negative for chills and fever.   HENT:  Negative for trouble swallowing.    Eyes:  Negative for visual disturbance.   Respiratory:  Negative for cough and shortness of breath.    Cardiovascular:  Negative for chest pain and palpitations.   Gastrointestinal:  Negative for abdominal pain, blood in stool and vomiting.   Endocrine: Negative for cold intolerance and heat intolerance.   Genitourinary:  Negative for difficulty urinating and dysuria.   Musculoskeletal:  Negative for gait problem.   Skin:  Negative for rash.   Neurological:  Negative for dizziness, syncope and headaches.   Hematological:  Negative for adenopathy.   Psychiatric/Behavioral:  Negative for behavioral problems.        Objective   /70 (BP Location: Left arm, Patient Position: Sitting, Cuff Size: Adult)   Pulse 78   Temp (!) 97.2 °F (36.2 °C) (Tympanic)   Resp 16   Ht 5' 10\" (1.778 m)   Wt 68.5 kg (151 lb)   SpO2 97%   BMI 21.67 kg/m²      Physical Exam  Vitals and nursing note reviewed.   Constitutional:       Appearance: He is well-developed.   HENT:      Head: Normocephalic and atraumatic.   Eyes:      Pupils: Pupils are equal, round, and reactive to light.   Cardiovascular:      Rate and Rhythm: Normal rate and regular rhythm.      Heart sounds: Normal heart sounds.   Pulmonary:      Effort: Pulmonary effort is normal.      Breath sounds: Normal breath sounds.   Abdominal:      General: Bowel sounds are normal.      Palpations: Abdomen is soft.   Musculoskeletal:      Cervical back: Normal range of motion and neck supple.   Lymphadenopathy:      Cervical: No cervical adenopathy.   Skin:     General: Skin is warm.      Findings: No rash.   Neurological:      Mental Status: He is alert and oriented to person, place, and time.         "

## 2025-07-22 ENCOUNTER — OFFICE VISIT (OUTPATIENT)
Dept: URGENT CARE | Age: 75
End: 2025-07-22
Payer: MEDICARE

## 2025-07-22 VITALS
RESPIRATION RATE: 18 BRPM | TEMPERATURE: 98.5 F | WEIGHT: 156.2 LBS | SYSTOLIC BLOOD PRESSURE: 158 MMHG | BODY MASS INDEX: 22.41 KG/M2 | DIASTOLIC BLOOD PRESSURE: 70 MMHG | OXYGEN SATURATION: 99 % | HEART RATE: 77 BPM

## 2025-07-22 DIAGNOSIS — M25.519 SHOULDER PAIN, UNSPECIFIED CHRONICITY, UNSPECIFIED LATERALITY: Primary | ICD-10-CM

## 2025-07-22 PROCEDURE — G0463 HOSPITAL OUTPT CLINIC VISIT: HCPCS

## 2025-07-22 PROCEDURE — 99213 OFFICE O/P EST LOW 20 MIN: CPT

## 2025-07-22 RX ORDER — NAPROXEN 500 MG/1
500 TABLET ORAL 2 TIMES DAILY WITH MEALS
Qty: 10 TABLET | Refills: 0 | Status: SHIPPED | OUTPATIENT
Start: 2025-07-22 | End: 2025-07-27

## 2025-07-22 NOTE — PROGRESS NOTES
[facility]  Name: Rodney Maldonado      : 1950      MRN: [id number]  Encounter Provider: Negrito Franco PA-C  Encounter Date: 2025   Enc[facility]  :  Assessment & Plan  Shoulder pain, unspecified chronicity, unspecified laterality    Orders:    Ambulatory Referral to Orthopedic Surgery; Future    naproxen (Naprosyn) 500 mg tablet; Take 1 tablet (500 mg total) by mouth 2 (two) times a day with meals for 5 days        Patient Instructions  Take naproxen as prescribed  Ice as needed.  Acetaminophen and/or ibuprofen for pain and inflammation AFTER finished with naproxen  Follow-up with orthopedics.  PCP follow-up in 3-5 days  Proceed to the ER if symptoms worsen.    If tests are performed, our office will contact you with results only if changes need to made to the care plan discussed with you at the visit. [facility]    Chief Complaint:   Chief Complaint   Patient presents with    Shoulder Pain     Sore L shoulder after working outside all day. Took nothing for pain PTA     History of Present Illness   74 y/o M presents for   Yesterday pt was performing outside yard work. Was using his L arm to tighten screw, when developed pain in L shoulder. Pain resolved and continued working. At night pain returned.   Woke up this AM with continuation of pain  No use of OTC meds or tx at home.    Shoulder Pain           Review of Systems  Past Medical History   Past Medical History[1]  Past Surgical History[2]  Family History[3]  he reports that he has never smoked. He has never used smokeless tobacco. He reports that he does not drink alcohol and does not use drugs.  Current Outpatient Medications   Medication Instructions    albuterol (PROVENTIL HFA,VENTOLIN HFA) 90 mcg/act inhaler 2 puffs, Inhalation, Every 6 hours PRN    atorvastatin (LIPITOR) 40 mg    Carboxymethylcellulose Sod PF 0.25 % SOLN INSTILL ONE DROP BOTH  "EYES FOUR TIMES A DAY FOR DRY EYES    Cholecalciferol 2,000 Units    dextran 70-hypromellose (GENTEAL TEARS) 0.1-0.3 % ophthalmic solution 1 drop    Diclofenac Sodium (VOLTAREN) 1 % Apply topically    ketotifen (ZADITOR) 0.025 % ophthalmic solution INSTILL ONE DROP BOTH EYES TWICE A DAY    mirtazapine (REMERON) 30 mg, Daily at bedtime    naproxen (NAPROSYN) 500 mg, Oral, 2 times daily with meals    polyvinyl alcohol (LIQUIFILM TEARS) 1.4 % ophthalmic solution 1 drop    sertraline (ZOLOFT) 100 mg, Daily   Allergies[4]     Objective   /70   Pulse 77   Temp 98.5 °F (36.9 °C)   Resp 18   Wt 70.9 kg (156 lb 3.2 oz)   SpO2 99%   BMI 22.41 kg/m²      Physical Exam  Vitals and nursing note reviewed.   Constitutional:       General: He is not in acute distress.     Appearance: He is not toxic-appearing.   HENT:      Head: Normocephalic and atraumatic.     Eyes:      Conjunctiva/sclera: Conjunctivae normal.       Cardiovascular:      Rate and Rhythm: Normal rate and regular rhythm.   Pulmonary:      Effort: Pulmonary effort is normal.      Breath sounds: Normal breath sounds.     Musculoskeletal:         General: Tenderness present. No swelling or deformity. Normal range of motion.      Comments: Positive hawkings      Skin:     Capillary Refill: Capillary refill takes less than 2 seconds.      Findings: No bruising or erythema.     Neurological:      Mental Status: He is alert.      Coordination: Coordination normal.      Gait: Gait normal.     Psychiatric:         Mood and Affect: Mood normal.         Behavior: Behavior normal.         Portions of the record may have been created with voice recognition software.  Occasional wrong word or \"sound a like\" substitutions may have occurred due to the inherent limitations of voice recognition software.  Read the chart carefully and recognize, using context, where substitutions have occurred.       [1]   Past Medical History:  Diagnosis Date    Anxiety     Arthritis     " Depression     GERD (gastroesophageal reflux disease)     Gout     Hyperlipidemia     Hypertension     Kidney stone     Psoriatic arthritis (HCC)     PTSD (post-traumatic stress disorder)     SVT (supraventricular tachycardia) (HCC)     Thyroid tumor, benign    [2]   Past Surgical History:  Procedure Laterality Date    CARDIAC ELECTROPHYSIOLOGY MAPPING AND ABLATION      SVT    PARATHYROIDECTOMY      PROSTATE SURGERY     [3]   Family History  Problem Relation Name Age of Onset    Coronary artery disease Brother Sebastien callaway     Hypertension Mother      No Known Problems Father     [4]   Allergies  Allergen Reactions    Cefadroxil Other (See Comments)     bleeding internally    Midazolam Other (See Comments)     ARRHYTHMIA    Prazosin     Fentanyl Palpitations

## 2025-08-14 ENCOUNTER — APPOINTMENT (OUTPATIENT)
Age: 75
End: 2025-08-14
Attending: ORTHOPAEDIC SURGERY
Payer: MEDICARE

## 2025-08-14 ENCOUNTER — OFFICE VISIT (OUTPATIENT)
Age: 75
End: 2025-08-14
Payer: MEDICARE